# Patient Record
Sex: FEMALE | Race: BLACK OR AFRICAN AMERICAN | Employment: UNEMPLOYED | URBAN - METROPOLITAN AREA
[De-identification: names, ages, dates, MRNs, and addresses within clinical notes are randomized per-mention and may not be internally consistent; named-entity substitution may affect disease eponyms.]

---

## 2019-02-08 ENCOUNTER — HOSPITAL ENCOUNTER (INPATIENT)
Age: 49
LOS: 15 days | Discharge: NURSING, REHAB OR LONG-TERM CARE | DRG: 871 | End: 2019-02-23
Attending: EMERGENCY MEDICINE | Admitting: HOSPITALIST
Payer: MEDICARE

## 2019-02-08 ENCOUNTER — APPOINTMENT (OUTPATIENT)
Dept: GENERAL RADIOLOGY | Age: 49
DRG: 871 | End: 2019-02-08
Attending: EMERGENCY MEDICINE
Payer: MEDICARE

## 2019-02-08 ENCOUNTER — APPOINTMENT (OUTPATIENT)
Dept: CT IMAGING | Age: 49
DRG: 871 | End: 2019-02-08
Attending: EMERGENCY MEDICINE
Payer: MEDICARE

## 2019-02-08 DIAGNOSIS — N39.0 URINARY TRACT INFECTION WITHOUT HEMATURIA, SITE UNSPECIFIED: Primary | ICD-10-CM

## 2019-02-08 DIAGNOSIS — E80.6 HYPERBILIRUBINEMIA: ICD-10-CM

## 2019-02-08 DIAGNOSIS — R41.0 CONFUSION: ICD-10-CM

## 2019-02-08 DIAGNOSIS — Z99.2 HEMODIALYSIS PATIENT (HCC): ICD-10-CM

## 2019-02-08 DIAGNOSIS — M79.604 PAIN OF RIGHT LOWER EXTREMITY: ICD-10-CM

## 2019-02-08 PROBLEM — N18.6 ESRD (END STAGE RENAL DISEASE) ON DIALYSIS (HCC): Status: ACTIVE | Noted: 2019-02-08

## 2019-02-08 PROBLEM — G93.41 METABOLIC ENCEPHALOPATHY: Status: ACTIVE | Noted: 2019-02-08

## 2019-02-08 PROBLEM — K74.60 CIRRHOSIS (HCC): Status: ACTIVE | Noted: 2019-02-08

## 2019-02-08 PROBLEM — J44.9 COPD (CHRONIC OBSTRUCTIVE PULMONARY DISEASE) (HCC): Status: ACTIVE | Noted: 2019-02-08

## 2019-02-08 LAB
ALBUMIN SERPL-MCNC: 1.5 G/DL (ref 3.4–5)
ALBUMIN/GLOB SERPL: 0.3 {RATIO} (ref 0.8–1.7)
ALP SERPL-CCNC: 571 U/L (ref 45–117)
ALT SERPL-CCNC: 19 U/L (ref 13–56)
AMMONIA PLAS-SCNC: <10 UMOL/L (ref 11–32)
ANION GAP SERPL CALC-SCNC: 15 MMOL/L (ref 3–18)
APPEARANCE UR: ABNORMAL
APTT PPP: 62.2 SEC (ref 23–36.4)
AST SERPL-CCNC: 27 U/L (ref 15–37)
ATRIAL RATE: 99 BPM
BACTERIA URNS QL MICRO: ABNORMAL /HPF
BILIRUB DIRECT SERPL-MCNC: 18 MG/DL (ref 0–0.2)
BILIRUB SERPL-MCNC: 21.7 MG/DL (ref 0.2–1)
BILIRUB UR QL: ABNORMAL
BUN SERPL-MCNC: 31 MG/DL (ref 7–18)
BUN/CREAT SERPL: 5 (ref 12–20)
CALCIUM SERPL-MCNC: 10.3 MG/DL (ref 8.5–10.1)
CALCULATED P AXIS, ECG09: 81 DEGREES
CALCULATED P AXIS, ECG09: 87 DEGREES
CALCULATED P AXIS, ECG09: 88 DEGREES
CALCULATED R AXIS, ECG10: 26 DEGREES
CALCULATED R AXIS, ECG10: 29 DEGREES
CALCULATED R AXIS, ECG10: 46 DEGREES
CALCULATED T AXIS, ECG11: 121 DEGREES
CALCULATED T AXIS, ECG11: 84 DEGREES
CALCULATED T AXIS, ECG11: 85 DEGREES
CHLORIDE SERPL-SCNC: 88 MMOL/L (ref 100–108)
CK MB CFR SERPL CALC: ABNORMAL % (ref 0–4)
CK MB CFR SERPL CALC: ABNORMAL % (ref 0–4)
CK MB SERPL-MCNC: <1 NG/ML (ref 5–25)
CK MB SERPL-MCNC: <1 NG/ML (ref 5–25)
CK SERPL-CCNC: 34 U/L (ref 26–192)
CK SERPL-CCNC: 35 U/L (ref 26–192)
CO2 SERPL-SCNC: 28 MMOL/L (ref 21–32)
COLOR UR: ABNORMAL
CREAT SERPL-MCNC: 6.81 MG/DL (ref 0.6–1.3)
DIAGNOSIS, 93000: NORMAL
EPITH CASTS URNS QL MICRO: ABNORMAL /LPF (ref 0–5)
ERYTHROCYTE [DISTWIDTH] IN BLOOD BY AUTOMATED COUNT: 23.2 % (ref 11.6–14.5)
FLUAV AG NPH QL IA: NEGATIVE
FLUBV AG NOSE QL IA: NEGATIVE
GLOBULIN SER CALC-MCNC: 5.8 G/DL (ref 2–4)
GLUCOSE SERPL-MCNC: 70 MG/DL (ref 74–99)
GLUCOSE UR STRIP.AUTO-MCNC: NEGATIVE MG/DL
HCT VFR BLD AUTO: 30.7 % (ref 35–45)
HGB BLD-MCNC: 10.4 G/DL (ref 12–16)
HGB UR QL STRIP: ABNORMAL
INR PPP: 2.7 (ref 0.8–1.2)
KETONES UR QL STRIP.AUTO: NEGATIVE MG/DL
LEUKOCYTE ESTERASE UR QL STRIP.AUTO: ABNORMAL
LIPASE SERPL-CCNC: 40 U/L (ref 73–393)
MCH RBC QN AUTO: 26.1 PG (ref 24–34)
MCHC RBC AUTO-ENTMCNC: 33.9 G/DL (ref 31–37)
MCV RBC AUTO: 77.1 FL (ref 74–97)
NITRITE UR QL STRIP.AUTO: POSITIVE
P-R INTERVAL, ECG05: 150 MS
P-R INTERVAL, ECG05: 166 MS
P-R INTERVAL, ECG05: 166 MS
PH UR STRIP: 6.5 [PH] (ref 5–8)
PLATELET # BLD AUTO: 136 K/UL (ref 135–420)
POTASSIUM SERPL-SCNC: 4.9 MMOL/L (ref 3.5–5.5)
PROT SERPL-MCNC: 7.3 G/DL (ref 6.4–8.2)
PROT UR STRIP-MCNC: 30 MG/DL
PROTHROMBIN TIME: 29.2 SEC (ref 11.5–15.2)
Q-T INTERVAL, ECG07: 358 MS
Q-T INTERVAL, ECG07: 394 MS
Q-T INTERVAL, ECG07: 404 MS
QRS DURATION, ECG06: 102 MS
QRS DURATION, ECG06: 104 MS
QRS DURATION, ECG06: 118 MS
QTC CALCULATION (BEZET), ECG08: 459 MS
QTC CALCULATION (BEZET), ECG08: 505 MS
QTC CALCULATION (BEZET), ECG08: 518 MS
RBC # BLD AUTO: 3.98 M/UL (ref 4.2–5.3)
RBC #/AREA URNS HPF: ABNORMAL /HPF (ref 0–5)
SODIUM SERPL-SCNC: 131 MMOL/L (ref 136–145)
SP GR UR REFRACTOMETRY: 1.02 (ref 1–1.03)
TROPONIN I SERPL-MCNC: 0.17 NG/ML (ref 0–0.04)
TROPONIN I SERPL-MCNC: 0.18 NG/ML (ref 0–0.04)
UROBILINOGEN UR QL STRIP.AUTO: 0.2 EU/DL (ref 0.2–1)
VENTRICULAR RATE, ECG03: 99 BPM
WBC # BLD AUTO: 15.9 K/UL (ref 4.6–13.2)
WBC URNS QL MICRO: ABNORMAL /HPF (ref 0–4)

## 2019-02-08 PROCEDURE — 85027 COMPLETE CBC AUTOMATED: CPT

## 2019-02-08 PROCEDURE — 85610 PROTHROMBIN TIME: CPT

## 2019-02-08 PROCEDURE — 93005 ELECTROCARDIOGRAM TRACING: CPT

## 2019-02-08 PROCEDURE — 82140 ASSAY OF AMMONIA: CPT

## 2019-02-08 PROCEDURE — 77030011943

## 2019-02-08 PROCEDURE — 83690 ASSAY OF LIPASE: CPT

## 2019-02-08 PROCEDURE — 74011250637 HC RX REV CODE- 250/637: Performed by: HOSPITALIST

## 2019-02-08 PROCEDURE — 65270000029 HC RM PRIVATE

## 2019-02-08 PROCEDURE — 81001 URINALYSIS AUTO W/SCOPE: CPT

## 2019-02-08 PROCEDURE — 82550 ASSAY OF CK (CPK): CPT

## 2019-02-08 PROCEDURE — 71045 X-RAY EXAM CHEST 1 VIEW: CPT

## 2019-02-08 PROCEDURE — 74011250636 HC RX REV CODE- 250/636: Performed by: EMERGENCY MEDICINE

## 2019-02-08 PROCEDURE — 96365 THER/PROPH/DIAG IV INF INIT: CPT

## 2019-02-08 PROCEDURE — 85730 THROMBOPLASTIN TIME PARTIAL: CPT

## 2019-02-08 PROCEDURE — 80053 COMPREHEN METABOLIC PANEL: CPT

## 2019-02-08 PROCEDURE — 87086 URINE CULTURE/COLONY COUNT: CPT

## 2019-02-08 PROCEDURE — 87804 INFLUENZA ASSAY W/OPTIC: CPT

## 2019-02-08 PROCEDURE — 94664 DEMO&/EVAL PT USE INHALER: CPT

## 2019-02-08 PROCEDURE — 74011000258 HC RX REV CODE- 258: Performed by: EMERGENCY MEDICINE

## 2019-02-08 PROCEDURE — 87340 HEPATITIS B SURFACE AG IA: CPT

## 2019-02-08 PROCEDURE — 99285 EMERGENCY DEPT VISIT HI MDM: CPT

## 2019-02-08 PROCEDURE — 70450 CT HEAD/BRAIN W/O DYE: CPT

## 2019-02-08 PROCEDURE — 74011000250 HC RX REV CODE- 250: Performed by: HOSPITALIST

## 2019-02-08 PROCEDURE — 86706 HEP B SURFACE ANTIBODY: CPT

## 2019-02-08 PROCEDURE — 94640 AIRWAY INHALATION TREATMENT: CPT

## 2019-02-08 PROCEDURE — 82248 BILIRUBIN DIRECT: CPT

## 2019-02-08 RX ORDER — HYDRALAZINE HYDROCHLORIDE 25 MG/1
25 TABLET, FILM COATED ORAL 3 TIMES DAILY
Status: DISCONTINUED | OUTPATIENT
Start: 2019-02-08 | End: 2019-02-11

## 2019-02-08 RX ORDER — SEVELAMER CARBONATE 800 MG/1
800 TABLET, FILM COATED ORAL
Status: DISCONTINUED | OUTPATIENT
Start: 2019-02-08 | End: 2019-02-13

## 2019-02-08 RX ORDER — CARVEDILOL 25 MG/1
25 TABLET ORAL 2 TIMES DAILY WITH MEALS
Status: DISCONTINUED | OUTPATIENT
Start: 2019-02-08 | End: 2019-02-12

## 2019-02-08 RX ORDER — FLUTICASONE PROPIONATE AND SALMETEROL 250; 50 UG/1; UG/1
1 POWDER RESPIRATORY (INHALATION) EVERY 12 HOURS
Status: DISCONTINUED | OUTPATIENT
Start: 2019-02-08 | End: 2019-02-08

## 2019-02-08 RX ORDER — CINACALCET 30 MG/1
30 TABLET, FILM COATED ORAL DAILY
Status: DISCONTINUED | OUTPATIENT
Start: 2019-02-09 | End: 2019-02-11

## 2019-02-08 RX ORDER — CALCITRIOL 0.25 UG/1
0.5 CAPSULE ORAL DAILY
Status: DISCONTINUED | OUTPATIENT
Start: 2019-02-09 | End: 2019-02-11

## 2019-02-08 RX ORDER — BUDESONIDE 0.5 MG/2ML
500 INHALANT ORAL
Status: DISCONTINUED | OUTPATIENT
Start: 2019-02-08 | End: 2019-02-19

## 2019-02-08 RX ORDER — SEVELAMER HYDROCHLORIDE 400 MG/1
400 TABLET, FILM COATED ORAL
Status: DISCONTINUED | OUTPATIENT
Start: 2019-02-08 | End: 2019-02-08

## 2019-02-08 RX ORDER — ISOSORBIDE MONONITRATE 60 MG/1
60 TABLET, EXTENDED RELEASE ORAL
Status: DISCONTINUED | OUTPATIENT
Start: 2019-02-09 | End: 2019-02-11

## 2019-02-08 RX ORDER — ZOLPIDEM TARTRATE 5 MG/1
5 TABLET ORAL
Status: DISCONTINUED | OUTPATIENT
Start: 2019-02-08 | End: 2019-02-12

## 2019-02-08 RX ORDER — ARFORMOTEROL TARTRATE 15 UG/2ML
15 SOLUTION RESPIRATORY (INHALATION)
Status: DISCONTINUED | OUTPATIENT
Start: 2019-02-08 | End: 2019-02-19

## 2019-02-08 RX ORDER — CEFTRIAXONE 1 G/1
1 INJECTION, POWDER, FOR SOLUTION INTRAMUSCULAR; INTRAVENOUS
Status: DISCONTINUED | OUTPATIENT
Start: 2019-02-08 | End: 2019-02-08 | Stop reason: SDUPTHER

## 2019-02-08 RX ADMIN — BUDESONIDE 500 MCG: 0.5 INHALANT RESPIRATORY (INHALATION) at 21:44

## 2019-02-08 RX ADMIN — CEFTRIAXONE SODIUM 1 G: 1 INJECTION, POWDER, FOR SOLUTION INTRAMUSCULAR; INTRAVENOUS at 09:52

## 2019-02-08 RX ADMIN — ARFORMOTEROL TARTRATE 15 MCG: 15 SOLUTION RESPIRATORY (INHALATION) at 21:44

## 2019-02-08 RX ADMIN — HYDRALAZINE HYDROCHLORIDE 25 MG: 25 TABLET, FILM COATED ORAL at 22:57

## 2019-02-08 RX ADMIN — HYDRALAZINE HYDROCHLORIDE 25 MG: 25 TABLET, FILM COATED ORAL at 16:27

## 2019-02-08 RX ADMIN — SEVELAMER CARBONATE 800 MG: 800 TABLET, FILM COATED ORAL at 16:28

## 2019-02-08 RX ADMIN — CARVEDILOL 25 MG: 25 TABLET, FILM COATED ORAL at 16:27

## 2019-02-08 NOTE — ED PROVIDER NOTES
EMERGENCY DEPARTMENT HISTORY AND PHYSICAL EXAM 
 
6:42 AM 
 
 
Date: 2/8/2019 Patient Name: Jamie Underwood History of Presenting Illness Chief Complaint Patient presents with  Altered mental status History Provided By: Patient and EMS Additional History (Context): Jamie Underwood is a 46 y.o. female with a PMHx of renal failure and DM provided by her dialysis clinic who presents with acute AMS onset x \"this morning\". The pt was brought in via EMS from her dialysis clinic after \"not acting herself\". She was unable to complete her dialysis session. EMS reports her eyes are jaundice. Pt at bedside pt is not answering questions promptly. No further concerns or complaints at this time. Limited HPI due to AMS. PCP: None Chief Complaint: AMS Duration: \"this morning\" Hours Timing:  Acute Location: Generalized Quality: N/A Severity: Moderate Modifying Factors: N/A Associated Symptoms: No associated Sx Current Outpatient Medications Medication Sig Dispense Refill  fluticasone-salmeterol (ADVAIR DISKUS) 250-50 mcg/dose diskus inhaler Take 1 Puff by inhalation every twelve (12) hours.  ferric citrate (AURYXIA) 210 mg iron tablet Take  by mouth three (3) times daily (with meals).  calcitRIOL (ROCALTROL) 0.5 mcg capsule Take 0.5 mcg by mouth daily.  carvedilol (COREG) 25 mg tablet Take 25 mg by mouth two (2) times daily (with meals).  hydrALAZINE (APRESOLINE) 25 mg tablet Take 25 mg by mouth three (3) times daily.  isosorbide mononitrate ER (IMDUR) 60 mg CR tablet Take 60 mg by mouth every morning.  sevelamer (RENAGEL) 400 mg tablet Take 400 mg by mouth three (3) times daily (with meals).  multivitamin with minerals (RENAL PO) Take  by mouth.  sevelamer carbonate (RENVELA) 800 mg tab tab Take  by mouth three (3) times daily.  cinacalcet (SENSIPAR) 30 mg tablet Take 30 mg by mouth daily.  traZODone (DESYREL) 100 mg tablet Take 100 mg by mouth nightly. Past History Past Medical History: 
History reviewed. No pertinent past medical history. Past Surgical History: 
History reviewed. No pertinent surgical history. Family History: 
History reviewed. No pertinent family history. Social History: 
Social History Tobacco Use  Smoking status: Unknown If Ever Smoked Substance Use Topics  Alcohol use: No  
  Frequency: Never  Drug use: No  
 
 
Allergies: 
No Known Allergies Review of Systems Review of Systems Unable to perform ROS: Mental status change Physical Exam  
 
Visit Vitals /66 Pulse 98 Temp 97.5 °F (36.4 °C) Resp 17 Wt 61.2 kg (135 lb) SpO2 97% BMI 22.47 kg/m² Physical Exam  
Constitutional: She appears well-developed and well-nourished. No distress. HENT:  
Head: Normocephalic and atraumatic. Right Ear: External ear normal.  
Left Ear: External ear normal.  
Nose: Nose normal.  
Mouth/Throat: Oropharynx is clear and moist.  
Eyes: Conjunctivae and EOM are normal. Pupils are equal, round, and reactive to light. Scleral icterus is present. Neck: Normal range of motion. Neck supple. No JVD present. No tracheal deviation present. No thyromegaly present. Cardiovascular: Normal rate and regular rhythm. Exam reveals no friction rub. No murmur heard. Pulmonary/Chest: Effort normal and breath sounds normal. No stridor. She exhibits no tenderness. Lungs are clear. Abdominal: Soft. Bowel sounds are normal. She exhibits no distension. There is no tenderness. There is no rebound and no guarding. Musculoskeletal: Normal range of motion. She exhibits no tenderness. Left arm has fistula with good thrill. LLE is edematous. Lymphadenopathy:  
  She has no cervical adenopathy. Neurological: No cranial nerve deficit. Coordination normal.  
No response to verbal stimuli. Responsive to tactile stimuli. Skin: Skin is warm and dry. Psychiatric: She has a normal mood and affect. Her behavior is normal. Judgment and thought content normal.  
Nursing note and vitals reviewed. Diagnostic Study Results Labs - Recent Results (from the past 12 hour(s)) CBC W/O DIFF Collection Time: 02/08/19  6:05 AM  
Result Value Ref Range WBC 15.9 (H) 4.6 - 13.2 K/uL  
 RBC 3.98 (L) 4.20 - 5.30 M/uL  
 HGB 10.4 (L) 12.0 - 16.0 g/dL HCT 30.7 (L) 35.0 - 45.0 % MCV 77.1 74.0 - 97.0 FL  
 MCH 26.1 24.0 - 34.0 PG  
 MCHC 33.9 31.0 - 37.0 g/dL RDW 23.2 (H) 11.6 - 14.5 % PLATELET 147 538 - 251 K/uL METABOLIC PANEL, COMPREHENSIVE Collection Time: 02/08/19  6:05 AM  
Result Value Ref Range Sodium 131 (L) 136 - 145 mmol/L Potassium 4.9 3.5 - 5.5 mmol/L Chloride 88 (L) 100 - 108 mmol/L  
 CO2 28 21 - 32 mmol/L Anion gap 15 3.0 - 18 mmol/L Glucose 70 (L) 74 - 99 mg/dL BUN 31 (H) 7.0 - 18 MG/DL Creatinine 6.81 (H) 0.6 - 1.3 MG/DL  
 BUN/Creatinine ratio 5 (L) 12 - 20 GFR est AA 8 (L) >60 ml/min/1.73m2 GFR est non-AA 6 (L) >60 ml/min/1.73m2 Calcium 10.3 (H) 8.5 - 10.1 MG/DL Bilirubin, total 21.7 (H) 0.2 - 1.0 MG/DL  
 ALT (SGPT) 19 13 - 56 U/L  
 AST (SGOT) 27 15 - 37 U/L Alk. phosphatase 571 (H) 45 - 117 U/L Protein, total 7.3 6.4 - 8.2 g/dL Albumin 1.5 (L) 3.4 - 5.0 g/dL Globulin 5.8 (H) 2.0 - 4.0 g/dL A-G Ratio 0.3 (L) 0.8 - 1.7 CARDIAC PANEL,(CK, CKMB & TROPONIN) Collection Time: 02/08/19  6:05 AM  
Result Value Ref Range CK 35 26 - 192 U/L  
 CK - MB <1.0 <3.6 ng/ml CK-MB Index  0.0 - 4.0 % CALCULATION NOT PERFORMED WHEN RESULT IS BELOW LINEAR LIMIT Troponin-I, QT 0.18 (H) 0.0 - 0.045 NG/ML  
LIPASE Collection Time: 02/08/19  6:05 AM  
Result Value Ref Range Lipase 40 (L) 73 - 393 U/L  
BILIRUBIN, DIRECT Collection Time: 02/08/19  6:05 AM  
Result Value Ref Range Bilirubin, direct 18.0 (H) 0.0 - 0.2 MG/DL  
PTT Collection Time: 02/08/19  6:05 AM  
Result Value Ref Range aPTT 62.2 (H) 23.0 - 36.4 SEC PROTHROMBIN TIME + INR Collection Time: 02/08/19  6:05 AM  
Result Value Ref Range Prothrombin time 29.2 (H) 11.5 - 15.2 sec INR 2.7 (H) 0.8 - 1.2 EKG, 12 LEAD, INITIAL Collection Time: 02/08/19  7:33 AM  
Result Value Ref Range Ventricular Rate 99 BPM  
 Atrial Rate 99 BPM  
 P-R Interval 150 ms QRS Duration 118 ms Q-T Interval 404 ms QTC Calculation (Bezet) 518 ms Calculated P Axis 88 degrees Calculated R Axis 46 degrees Calculated T Axis 121 degrees Diagnosis Poor data quality, interpretation may be adversely affected Electrode noise Repeat EKG Normal sinus rhythm Poor R Wave Progression Non-specific ST/T wave changes Prolonged QT Abnormal ECG No previous ECGs available Confirmed by Ricky Srinivasan ([id number]) on 2/8/2019 10:54:45 AM 
  
EKG, 12 LEAD, SUBSEQUENT Collection Time: 02/08/19  7:38 AM  
Result Value Ref Range Ventricular Rate 99 BPM  
 Atrial Rate 99 BPM  
 P-R Interval 166 ms  
 QRS Duration 102 ms Q-T Interval 358 ms QTC Calculation (Bezet) 459 ms Calculated P Axis 81 degrees Calculated R Axis 26 degrees Calculated T Axis 84 degrees Diagnosis Normal sinus rhythm Possible Left atrial enlargement Poor R Wave Progression Nonspecific ST abnormality Abnormal ECG When compared with ECG of 08-FEB-2019 07:33, 
QT has shortened Confirmed by Ricky Srinivasan ([id number]) on 2/8/2019 10:55:05 AM 
  
INFLUENZA A & B AG (RAPID TEST) Collection Time: 02/08/19  7:44 AM  
Result Value Ref Range Influenza A Antigen NEGATIVE  NEG Influenza B Antigen NEGATIVE  NEG    
AMMONIA Collection Time: 02/08/19  7:45 AM  
Result Value Ref Range Ammonia <10 (L) 11 - 32 UMOL/L  
URINALYSIS W/ RFLX MICROSCOPIC Collection Time: 02/08/19  7:52 AM  
Result Value Ref Range Color DARK YELLOW Appearance CLOUDY Specific gravity 1.018 1.005 - 1.030    
 pH (UA) 6.5 5.0 - 8.0 Protein 30 (A) NEG mg/dL Glucose NEGATIVE  NEG mg/dL Ketone NEGATIVE  NEG mg/dL Bilirubin MODERATE (A) NEG Blood TRACE (A) NEG Urobilinogen 0.2 0.2 - 1.0 EU/dL Nitrites POSITIVE (A) NEG Leukocyte Esterase LARGE (A) NEG URINE MICROSCOPIC ONLY Collection Time: 02/08/19  7:52 AM  
Result Value Ref Range WBC TOO NUMEROUS TO COUNT 0 - 4 /hpf  
 RBC 4 to 10 0 - 5 /hpf Epithelial cells 3+ 0 - 5 /lpf Bacteria 4+ (A) NEG /hpf CARDIAC PANEL,(CK, CKMB & TROPONIN) Collection Time: 02/08/19  9:35 AM  
Result Value Ref Range CK 34 26 - 192 U/L  
 CK - MB <1.0 <3.6 ng/ml CK-MB Index  0.0 - 4.0 % CALCULATION NOT PERFORMED WHEN RESULT IS BELOW LINEAR LIMIT Troponin-I, QT 0.17 (H) 0.0 - 0.045 NG/ML  
EKG, 12 LEAD, SUBSEQUENT Collection Time: 02/08/19  9:52 AM  
Result Value Ref Range Ventricular Rate 99 BPM  
 Atrial Rate 99 BPM  
 P-R Interval 166 ms  
 QRS Duration 104 ms Q-T Interval 394 ms QTC Calculation (Bezet) 505 ms Calculated P Axis 87 degrees Calculated R Axis 29 degrees Calculated T Axis 85 degrees Diagnosis Normal sinus rhythm Possible Left atrial enlargement Poor data quality, interpretation may be adversely affected Prolonged QT Abnormal ECG When compared with ECG of 08-FEB-2019 07:38, No significant change was found Confirmed by [facility] ([id number]) on 2/8/2019 1:12:59 PM 
  
 
 
Radiologic Studies -  
XR CHEST PORT Final Result Impression: 1. Mildly underexpanded lungs and cardiac enlargement without superimposed acute  
radiographic cardiopulmonary abnormality. 2. Prominence of the main pulmonary arterial shadow may reflect sequela from  
pulmonary arterial hypertension. CT HEAD WO CONT Final Result IMPRESSION:  
  
  
1. No acute intracranial abnormality.   
2. Background mild periventricular white matter low-attenuation compatible with  
chronic ischemic microvascular change, with more focal areas of encephalomalacia  
as described, likely in keeping with prior strokes. Medical Decision Making It should be noted that Nikolai Crowell MD will be the provider of record for this patient. I reviewed the vital signs, available nursing notes, past medical history, past surgical history, family history and social history. Vital Signs-Reviewed the patient's vital signs. Pulse Oximetry Analysis -  100% on room air, normal.  
 
EKG: Interpreted by the EP. Time Interpreted: 07:33 Rate: 99 bpm 
 Rhythm: NSR Interpretation: QTC is 518 and Q-wave in lead III. The EKG states there is an acute MI. I don't see an acute STEMI on the actual EKG. Will repeat. EKG: Interpreted by the EP. Time Interpreted: 07:38 Rate: 99 bpm 
 Rhythm: NSR Interpretation: Q-wave in lead III. T-wave in the lateral wall. No obvious STEMI appreciated. EKG: Interpreted by the EP. Time Interpreted: 09:52 Rate: 99 bpm 
 Rhythm: NSR Interpretation: Prolonged QTC of 505. Unchanged morphology from prior. Records Reviewed: Nursing Notes and Old Medical Records (Time of Review: 6:42 AM) 
 
ED Course: Progress Notes, Reevaluation, and Consults: 
9:23 AM 
Pt's white count is 15.9. INR is 2.7. Creatinine is 6.81. Troponin is 0.18. Influenza is negative. CXR has no acute findings. Compared to 2/1/2019 INR is elevated today. Bilirubin is consistently high and same as prior visit. Will order repeat EKG and troponin. 12:15 PM, 2/8/2019 Consult:  Discussed care with Dr. Kody Luz, Specialty: Hospitalist. Standard discussion; including history of patients chief complaint, available diagnostic results, and treatment course. Accepts pt for admission. Consult:  Discussed care with Dr. Sheridan Kim, Specialty: GI. Standard discussion; including history of patients chief complaint, available diagnostic results, and treatment course. 12:42 PM, 2/8/2019  
 
1:04 PM 
Transfer center states the pt was seen by ELIE Cesar. They will get him to call back. They also state cannot be transferred due to [facility] being at capacity. Consult:  Discussed care with Brandi Caal PA-C who works for Dr. Sherwin Das, Specialty: GI  Standard discussion; including history of patients chief complaint, available diagnostic results, and treatment course. They think the pt is not in liver failure. They have worked her up, this is secondary to congestion. If there is confusion it is not related to the liver. 1:12 PM, 2/8/2019  
 
1:20 PM 
Pt's eileen is at bedside now. He states she has been confused intermittently for the last 5 days, and today was worse. 
 
2:02 PM, 2/8/2019 Consult:  Discussed care with Dr. Von Zapata, Specialty: Hospitalist. Standard discussion; including history of patients chief complaint, available diagnostic results, and treatment course. Accepts pt for admission. Provider Notes (Medical Decision Making):  
Patient presents to emergency department from dialysis. Patient is awake and alert and answers questions when given intact L simulation patient is sleepy to verbal stimulation patient does have jaundice. Patient vitals documented with-in normal limits. Triage note is reviewed and status patient is not acting herself. During my evaluated the patient and patient is sluggish to response although is awake and alert and knows she lives in [place] and knows her name and age. Patient states she cannot move her left leg well and also has swelling in the leg otherwise has no other complaints 
 patient has a fistula in the left arm that seems to have a good thrill with no acute findings on exam physical exam otherwise benign other than the jaundice. I will check patient for encephalopathy urinary tract infection if I can get a urinalysis I will check also EKG troponin for any acute coronary syndrome pneumonia and other causes for infection. I am reviewing patient's prior medical record: Which shows lower extremity cellulitis end-stage renal disease coronary artery disease bronchitis heart failure diabetes and hyperbilirubinemia as prior history patient had PVL of left lower extremity on February 1, 2019 there is no acute DVT that was diagnosed on the February 1 exam.   
On that day patient also had a white count of 15.8 and hemoglobin 11.3 patient on bilirubin of 22 and direct was 19.6 and alk phos was 608 Critical Care Time: The services I provided to this patient were to treat and/or prevent clinically significant deterioration that could result in the failure of one or more body systems and/or organ systems due to AMS. Services included the following: 
-reviewing nursing notes and old charts 
-vital sign assessments 
-direct patient care 
-medication orders and management 
-interpreting and reviewing diagnostic studies/labs 
-re-evaluations 
-documentation time Aggregate critical care time was 30 minutes, which includes only time during which I was engaged in work directly related to the patient's care as described above, whether I was at bedside or elsewhere in the Emergency Department. It did not include time spent performing other reported procedures or the services of residents, students, nurses, or advance practice providers. Umu Talbot MD 
 
7:40 AM 
 
For Hospitalized Patients: 
 
1. Hospitalization Decision Time: The decision to hospitalize the patient was made by Dr. Bonnie Hidalgo at 12:11 PM on 2/8/2019 2. Aspirin: Aspirin was not given because the patient did not present with a stroke at the time of their Emergency Department evaluation Diagnosis Clinical Impression: 1. Urinary tract infection without hematuria, site unspecified 2. Hyperbilirubinemia 3. Confusion 4. [facility] Disposition: Admission Follow-up Information None Medication List  
  
ASK your doctor about these medications ADVAIR DISKUS 250-50 mcg/dose diskus inhaler Generic drug:  fluticasone-salmeterol AURYXIA 210 mg iron tablet Generic drug:  ferric citrate 
  
calcitRIOL 0.5 mcg capsule Commonly known as:  ROCALTROL 
  
carvedilol 25 mg tablet Commonly known as:  COREG 
  
hydrALAZINE 25 mg tablet Commonly known as:  APRESOLINE 
  
isosorbide mononitrate ER 60 mg CR tablet Commonly known as:  IMDUR 
  
RENAGEL 400 mg tablet Generic drug:  sevelamer RENAL PO 
  
SENSIPAR 30 mg tablet Generic drug:  cinacalcet 
  
sevelamer carbonate 800 mg Tab tab Commonly known as:  RENVELA 
  
traZODone 100 mg tablet Commonly known as:  Antoni Hinojosa 
  
  
 
_______________________________ Scribe Attestation Ilene Cool acting as a scribe for and in the presence of Umu Talbot MD     
February 08, 2019 at [facility] 
    
Provider Attestation:     
I personally performed the services described in the documentation, reviewed the documentation, as recorded by the scribe in my presence, and it accurately and completely records my words and actions. February 08, 2019 at [address] [facility] Umu Talbot MD   
 
 
_______________________________

## 2019-02-08 NOTE — ED NOTES
Presents via medic awake, alert and in NAD. Medics report pt was at dialysis this am and staff there called 911 d/t pt \"not acting herself\"  Pt eyes are jaundice and she is not answering questions promptly.

## 2019-02-08 NOTE — ED NOTES
[facility]  24/7 and when pt admitted with room number. Ravi Ordoñez (daughter) [phone]

## 2019-02-08 NOTE — PROGRESS NOTES
attempted to complete the initial Spiritual Assessment of the patient in bed 5 of the emergency room, and offer Pastoral Care support. Patient however was not responsive at this time. No family seen . Patient does not have any Advent/cultural needs that will affect patients preferences in health care. Chaplains will continue to follow and will provide pastoral care on an as needed/requested basis. Blank 3 Board Certified [employer] Spiritual Care Department [phone]

## 2019-02-09 ENCOUNTER — APPOINTMENT (OUTPATIENT)
Dept: CT IMAGING | Age: 49
DRG: 871 | End: 2019-02-09
Attending: PHYSICIAN ASSISTANT
Payer: MEDICARE

## 2019-02-09 LAB
ANION GAP SERPL CALC-SCNC: 17 MMOL/L (ref 3–18)
BASOPHILS # BLD: 0.1 K/UL (ref 0–0.1)
BASOPHILS NFR BLD: 1 % (ref 0–2)
BUN SERPL-MCNC: 43 MG/DL (ref 7–18)
BUN/CREAT SERPL: 6 (ref 12–20)
CALCIUM SERPL-MCNC: 9.2 MG/DL (ref 8.5–10.1)
CALCIUM SERPL-MCNC: 9.4 MG/DL (ref 8.5–10.1)
CHLORIDE SERPL-SCNC: 91 MMOL/L (ref 100–108)
CO2 SERPL-SCNC: 27 MMOL/L (ref 21–32)
CREAT SERPL-MCNC: 7.68 MG/DL (ref 0.6–1.3)
DIFFERENTIAL METHOD BLD: ABNORMAL
EOSINOPHIL # BLD: 0.1 K/UL (ref 0–0.4)
EOSINOPHIL NFR BLD: 1 % (ref 0–5)
ERYTHROCYTE [DISTWIDTH] IN BLOOD BY AUTOMATED COUNT: 23.4 % (ref 11.6–14.5)
GLUCOSE SERPL-MCNC: 61 MG/DL (ref 74–99)
HBV SURFACE AG SER QL: <0.1 INDEX
HBV SURFACE AG SER QL: NEGATIVE
HCT VFR BLD AUTO: 28.5 % (ref 35–45)
HGB BLD-MCNC: 9.6 G/DL (ref 12–16)
IRON SATN MFR SERPL: 47 %
IRON SERPL-MCNC: 67 UG/DL (ref 50–175)
LYMPHOCYTES # BLD: 1.4 K/UL (ref 0.9–3.6)
LYMPHOCYTES NFR BLD: 11 % (ref 21–52)
MCH RBC QN AUTO: 25.7 PG (ref 24–34)
MCHC RBC AUTO-ENTMCNC: 33.7 G/DL (ref 31–37)
MCV RBC AUTO: 76.2 FL (ref 74–97)
MONOCYTES # BLD: 0.6 K/UL (ref 0.05–1.2)
MONOCYTES NFR BLD: 5 % (ref 3–10)
NEUTS SEG # BLD: 10.5 K/UL (ref 1.8–8)
NEUTS SEG NFR BLD: 82 % (ref 40–73)
PHOSPHATE SERPL-MCNC: 7.3 MG/DL (ref 2.5–4.9)
PLATELET # BLD AUTO: 113 K/UL (ref 135–420)
POTASSIUM SERPL-SCNC: 5.2 MMOL/L (ref 3.5–5.5)
PTH-INTACT SERPL-MCNC: 70.3 PG/ML (ref 18.4–88)
RBC # BLD AUTO: 3.74 M/UL (ref 4.2–5.3)
SODIUM SERPL-SCNC: 135 MMOL/L (ref 136–145)
TIBC SERPL-MCNC: 143 UG/DL (ref 250–450)
WBC # BLD AUTO: 12.6 K/UL (ref 4.6–13.2)

## 2019-02-09 PROCEDURE — 74011000258 HC RX REV CODE- 258: Performed by: HOSPITALIST

## 2019-02-09 PROCEDURE — 74011250636 HC RX REV CODE- 250/636: Performed by: PHYSICIAN ASSISTANT

## 2019-02-09 PROCEDURE — 83540 ASSAY OF IRON: CPT

## 2019-02-09 PROCEDURE — 65270000029 HC RM PRIVATE

## 2019-02-09 PROCEDURE — 80048 BASIC METABOLIC PNL TOTAL CA: CPT

## 2019-02-09 PROCEDURE — 74011000250 HC RX REV CODE- 250: Performed by: HOSPITALIST

## 2019-02-09 PROCEDURE — 84100 ASSAY OF PHOSPHORUS: CPT

## 2019-02-09 PROCEDURE — 90935 HEMODIALYSIS ONE EVALUATION: CPT

## 2019-02-09 PROCEDURE — 74011250636 HC RX REV CODE- 250/636: Performed by: HOSPITALIST

## 2019-02-09 PROCEDURE — 36415 COLL VENOUS BLD VENIPUNCTURE: CPT

## 2019-02-09 PROCEDURE — 74176 CT ABD & PELVIS W/O CONTRAST: CPT

## 2019-02-09 PROCEDURE — 74011250637 HC RX REV CODE- 250/637: Performed by: HOSPITALIST

## 2019-02-09 PROCEDURE — 94640 AIRWAY INHALATION TREATMENT: CPT

## 2019-02-09 PROCEDURE — 83970 ASSAY OF PARATHORMONE: CPT

## 2019-02-09 PROCEDURE — 85025 COMPLETE CBC W/AUTO DIFF WBC: CPT

## 2019-02-09 PROCEDURE — 5A1D70Z PERFORMANCE OF URINARY FILTRATION, INTERMITTENT, LESS THAN 6 HOURS PER DAY: ICD-10-PCS | Performed by: INTERNAL MEDICINE

## 2019-02-09 RX ORDER — LORAZEPAM 2 MG/ML
0.5 INJECTION INTRAMUSCULAR ONCE
Status: COMPLETED | OUTPATIENT
Start: 2019-02-09 | End: 2019-02-09

## 2019-02-09 RX ORDER — SODIUM CHLORIDE 9 MG/ML
50 INJECTION, SOLUTION INTRAVENOUS
Status: DISCONTINUED | OUTPATIENT
Start: 2019-02-09 | End: 2019-02-12

## 2019-02-09 RX ORDER — LORAZEPAM 2 MG/ML
0.5 INJECTION INTRAMUSCULAR
Status: DISCONTINUED | OUTPATIENT
Start: 2019-02-09 | End: 2019-02-12

## 2019-02-09 RX ORDER — LORAZEPAM 2 MG/ML
0.5 INJECTION INTRAMUSCULAR
Status: DISCONTINUED | OUTPATIENT
Start: 2019-02-09 | End: 2019-02-09

## 2019-02-09 RX ADMIN — HYDRALAZINE HYDROCHLORIDE 25 MG: 25 TABLET, FILM COATED ORAL at 22:37

## 2019-02-09 RX ADMIN — BUDESONIDE 500 MCG: 0.5 INHALANT RESPIRATORY (INHALATION) at 20:49

## 2019-02-09 RX ADMIN — LORAZEPAM 0.5 MG: 2 INJECTION, SOLUTION INTRAMUSCULAR; INTRAVENOUS at 16:24

## 2019-02-09 RX ADMIN — ARFORMOTEROL TARTRATE 15 MCG: 15 SOLUTION RESPIRATORY (INHALATION) at 20:49

## 2019-02-09 RX ADMIN — CEFTRIAXONE 1 G: 1 INJECTION, POWDER, FOR SOLUTION INTRAMUSCULAR; INTRAVENOUS at 17:24

## 2019-02-09 RX ADMIN — LORAZEPAM 0.5 MG: 2 INJECTION, SOLUTION INTRAMUSCULAR; INTRAVENOUS at 11:31

## 2019-02-09 NOTE — PROGRESS NOTES
Reason for Admission: uti RRAT Score:    19 Do you (patient/family) have any concerns for transition/discharge? Has been declining Plan for utilizing home health:     yes Likelihood of readmission?   mod Transition of Care Plan:  Spoke with pt, not able to participate fully in interview. Wants me to call apolinar willingham on board in room. Called maulik easton, he is her Victoriano Gustine" she lives with him. He states at baseline pt is A&O x4. She has been declining for a couple of weeks, getting \"delerious\". She amb with a walker. Independent with adls. but  He has had to help her lately. She goes to dialysis at on [address] he does not know the name. She goes [facility] 6a-10a by handiride. [facility] pcp is at [facility], he does not know name. Pt has a dtr mitchell pollack, her # is [phone]. Plan home with hh, they recently dc'd her, may need snf cm to follow. Patient has designated ___friend_____________________ to participate in his/her discharge plan and to receive any needed information. Name: Missy lambert Address: 
Phone number:[phone]

## 2019-02-09 NOTE — PROGRESS NOTES
Problem: Discharge Planning Goal: *Discharge to safe environment Outcome: Progressing Towards Goal 
hh vs snf

## 2019-02-09 NOTE — H&P
Internal Medicine History and Physical 
   
 
 
Subjective HPI: Dhruv Isaac is a 46 y.o. female with a PMHx ESRD on dialysis, DM, CAD, COPD, syst/diastolic heart failure, HTN, chronic liver disease who presented to the ED from dialysis due to AMS. History limited 2/2 AMS. ED eval revealed scleral icterus, confusion, leukocytosis, UTI. Hyperbilirubinemia and elevated alk phos are similar to recent ED visit at [facility] on 2/1. Review of prior records revealed she was supposed to have a percutaneous gallbladder drain placed on 2/4/19. Head CT neg. Pt will be admitted for further evaluation. PMHx: 
Past Medical History:  
Diagnosis Date  CAD (coronary artery disease)  Chronic obstructive pulmonary disease ([facility]  Combined systolic and diastolic heart failure ([facility]  Diabetes ([facility] [facility]  ESRD on dialysis ([facility]  HLD (hyperlipidemia)  Hypertension  Liver disease  S/P heart valve repair PSurgHx: 
History reviewed. No pertinent surgical history. SocialHx: 
Social History Socioeconomic History  Marital status: SINGLE Spouse name: Not on file  Number of children: Not on file  Years of education: Not on file  Highest education level: Not on file Social Needs  Financial resource strain: Not on file  Food insecurity - worry: Not on file  Food insecurity - inability: Not on file  Transportation needs - medical: Not on file  Transportation needs - non-medical: Not on file Occupational History  Not on file Tobacco Use  Smoking status: Unknown If Ever Smoked Substance and Sexual Activity  Alcohol use: No  
  Frequency: Never  Drug use: No  
 Sexual activity: Not on file Other Topics Concern  Not on file Social History Narrative  Not on file FamilyHx: 
History reviewed. No pertinent family history. Prior to Admission Medications Prescriptions Last Dose Informant Patient Reported? Taking? calcitRIOL (ROCALTROL) 0.5 mcg capsule   Yes No  
Sig: Take 0.5 mcg by mouth daily. carvedilol (COREG) 25 mg tablet   Yes No  
Sig: Take 25 mg by mouth two (2) times daily (with meals). cinacalcet (SENSIPAR) 30 mg tablet   Yes No  
Sig: Take 30 mg by mouth daily. ferric citrate (AURYXIA) 210 mg iron tablet   Yes No  
Sig: Take  by mouth three (3) times daily (with meals). fluticasone-salmeterol (ADVAIR DISKUS) 250-50 mcg/dose diskus inhaler   Yes No  
Sig: Take 1 Puff by inhalation every twelve (12) hours. hydrALAZINE (APRESOLINE) 25 mg tablet   Yes No  
Sig: Take 25 mg by mouth three (3) times daily. isosorbide mononitrate ER (IMDUR) 60 mg CR tablet   Yes No  
Sig: Take 60 mg by mouth every morning.  
multivitamin with minerals (RENAL PO)   Yes No  
Sig: Take  by mouth.  
sevelamer (RENAGEL) 400 mg tablet   Yes No  
Sig: Take 400 mg by mouth three (3) times daily (with meals). sevelamer carbonate (RENVELA) 800 mg tab tab   Yes No  
Sig: Take  by mouth three (3) times daily. traZODone (DESYREL) 100 mg tablet   Yes No  
Sig: Take 100 mg by mouth nightly. Facility-Administered Medications: None Review of Systems: 
Unable to obtain 2/2 AMS Objective Visit Vitals /65 (BP 1 Location: Right arm, BP Patient Position: Supine) Pulse 97 Temp 98.2 °F (36.8 °C) Resp 15 Wt 61.2 kg (135 lb) SpO2 95% BMI 22.47 kg/m² Physical Exam: 
General Appearance: somnolent, confused HEENT: normocephalic/atraumatic, scleral icterus Lungs: CTA with normal respiratory effort Cardiovascular: RRR, no m/r/g, capillary refill < 2 sec, B/L DP/PT pulses +3/4 Abdomen: soft, non-tender, normal bowel sounds Extremities: no cyanosis, no peripheral edema Neuro: moves all extremities, confused Laboratory Studies: 
BMP:  
Lab Results Component Value Date/Time   (L) 02/08/2019 06:05 AM  
 K 4.9 02/08/2019 06:05 AM  
 CL 88 (L) 02/08/2019 06:05 AM  
 CO2 28 02/08/2019 06:05 AM  
 AGAP 15 02/08/2019 06:05 AM  
 GLU 70 (L) 02/08/2019 06:05 AM  
 BUN 31 (H) 02/08/2019 06:05 AM  
 CREA 6.81 (H) 02/08/2019 06:05 AM  
 GFRAA 8 (L) 02/08/2019 06:05 AM  
 GFRNA 6 (L) 02/08/2019 06:05 AM  
 
CBC:  
Lab Results Component Value Date/Time WBC 15.9 (H) 02/08/2019 06:05 AM  
 HGB 10.4 (L) 02/08/2019 06:05 AM  
 HCT 30.7 (L) 02/08/2019 06:05 AM  
  02/08/2019 06:05 AM  
 
 
Imaging Reviewed: 
Ct Head Wo Cont Result Date: 2/8/2019 EXAM: CT head INDICATION: Altered mental status COMPARISON: None. TECHNIQUE: Axial CT imaging of the head was performed without intravenous contrast. One or more dose reduction techniques were used on this CT: automated exposure control, adjustment of the mAs and/or kVp according to patient size, and iterative reconstruction techniques. The specific techniques used on this CT exam have been documented in the patient's electronic medical record. Digital Imaging and Communications in Medicine (DICOM) format image data are available to nonaffiliated external healthcare facilities or entities on a secure, media free, reciprocally searchable basis with patient authorization for at least a 12-month period after this study. _______________ FINDINGS: BRAIN AND POSTERIOR FOSSA: Cortical and cerebellar volume loss is demonstrated. Ventricular size and configuration is within normal limits. Mild periventricular white matter low-attenuation focal areas of encephalomalacia in the left centrum semiovale bowel paramedian right parietal lobe. There is no intracranial hemorrhage, mass effect, or midline shift. The gray-white matter differentiation appears within normal limits. EXTRA-AXIAL SPACES AND MENINGES: There are no abnormal extra-axial fluid collections. CALVARIUM: Intact. SINUSES: Clear. OTHER: None. _______________ IMPRESSION: 1. No acute intracranial abnormality.  2. Background mild periventricular white matter low-attenuation compatible with chronic ischemic microvascular change, with more focal areas of encephalomalacia as described, likely in keeping with prior strokes. Xr Chest [facility] Result Date: 2/8/2019 Chest, single view Indication: Altered mental status, confusion, jaundice Comparison: None Findings:  Portable upright AP view of the chest was obtained. No focal pneumonic opacity, pneumothorax, or pleural effusion. Cardiac size is enlarged, with prominence of the main pulmonary arterial shadow. No acute osseous abnormality. Impression: 1. Mildly underexpanded lungs and cardiac enlargement without superimposed acute radiographic cardiopulmonary abnormality. 2. Prominence of the main pulmonary arterial shadow may reflect sequela from pulmonary arterial hypertension. EKG:  
normal sinus rhythm, prolonged QT interval 505. Assessment/Plan Principal Problem: 
  UTI (urinary tract infection) (2/8/2019) Active Problems: Metabolic encephalopathy (7/4/1716) Cirrhosis ([facility] [address] (2/8/2019) ESRD (end stage renal disease) on dialysis ([facility] [address] (2/8/2019) COPD (chronic obstructive pulmonary disease) ([facility] [address] (2/8/2019) Hyperbilirubinemia (2/8/2019) UTI 
- iv abx 
- follow cx Metabolic encephalopathy 
- Head CT - no acute abnormality 
- monitor neuro status ESRD on dialysis 
- nephro consult in AM 
 
COPD 
- brovana, pulmicort Hyperbilirubinemia/ elevated alk phos 
- CT abd in AM 
- GI consult - appreciate assistance 
 
 
- Cont acceptable home medications for chronic conditions  
- DVT protocol I have personally reviewed all pertinent labs, films and EKGs that have officially resulted. I reviewed available electronic documentation outlining the initial presentation as well as the emergency room physician's encounter. Clayton Hopper PA-C [facility] Office:  [phone] Pager: [phone]

## 2019-02-09 NOTE — PROGRESS NOTES
Thank you for notifying us of patient's admission for altered ms. I called op dialysis unit- [facility] on [address]  Mental status is normal at baseline, presented there yesterday with confusion. No dialysis was done. Will order dialysis for this am while altered ms is being worked up. Thanks.

## 2019-02-09 NOTE — PROGRESS NOTES
Consult note Admit Date: 2/8/2019 Consult Date: 2/9/19 Requesting MD: Zaida Larson Reason for Consult ESRD Patient Active Problem List  
 Diagnosis Date Noted  Metabolic encephalopathy 38/19/5635  UTI (urinary tract infection) 02/08/2019  Cirrhosis ([facility] [address] 02/08/2019  ESRD (end stage renal disease) on dialysis ([facility] [address] 02/08/2019  COPD (chronic obstructive pulmonary disease) ([facility] [address] 02/08/2019  Hyperbilirubinemia 02/08/2019 HPI  
52yo BF with ESRD on Hd at [facility] HD under Dr Deonte Brown care admitted for AMS. Last HD on Wednesday. has a past medical history of CAD (coronary artery disease), Chronic obstructive pulmonary disease ([facility] [address] Combined systolic and diastolic heart failure ([facility] [address] Diabetes ([facility] [address] ESRD on dialysis ([facility] [address] HLD (hyperlipidemia), Hypertension, Liver disease, and S/P heart valve repair. Social History Socioeconomic History  Marital status: SINGLE Spouse name: Not on file  Number of children: Not on file  Years of education: Not on file  Highest education level: Not on file Social Needs  Financial resource strain: Not on file  Food insecurity - worry: Not on file  Food insecurity - inability: Not on file  Transportation needs - medical: Not on file  Transportation needs - non-medical: Not on file Occupational History  Not on file Tobacco Use  Smoking status: Unknown If Ever Smoked Substance and Sexual Activity  Alcohol use: No  
  Frequency: Never  Drug use: No  
 Sexual activity: Not on file Other Topics Concern [address] Service Not Asked  Blood Transfusions Not Asked  Caffeine Concern Not Asked  Occupational Exposure Not Asked [employer] Hazards Not Asked  Sleep Concern Not Asked  Stress Concern Not Asked  Weight Concern Not Asked  Special Diet Not Asked  Back Care Not Asked  Exercise Not Asked  Bike Helmet Not Asked  Seat Belt Not Asked  Self-Exams Not Asked Social History Narrative  Not on file History reviewed. No pertinent family history. Current Facility-Administered Medications:  
  cefTRIAXone (ROCEPHIN) 1 g in 0.9% sodium chloride (MBP/ADV) 50 mL MBP, 1 g, IntraVENous, Q24H, Kelsy Mnceill PA 
  LORazepam (ATIVAN) injection 0.5 mg, 0.5 mg, IntraVENous, Q12H PRN, Kelsy Mcneill PA 
  0.9% sodium chloride infusion, 50 mL/hr, IntraVENous, DIALYSIS PRN, Zari Zimmerman MD 
  calcitRIOL (ROCALTROL) capsule 0.5 mcg, 0.5 mcg, Oral, DAILY, Suleman Villalobos MD 
  carvedilol (COREG) tablet 25 mg, 25 mg, Oral, BID WITH MEALS, Dipak Villalobos MD, 25 mg at 02/08/19 1627 
  cinacalcet (SENSIPAR) tablet 30 mg, 30 mg, Oral, DAILY, Suleman Villalobos MD 
  isosorbide mononitrate ER (IMDUR) tablet 60 mg, 60 mg, Oral, 7am, Suleman Villalobos MD 
  hydrALAZINE (APRESOLINE) tablet 25 mg, 25 mg, Oral, TID, Maria Ines Shen MD, 25 mg at 02/08/19 2257   B complex-vitamin C-folic acid (NEPHRO-NACHO) 0.8 mg tab, 1 Tab, Oral, DAILY, Suleman Villalobos MD 
  sevelamer carbonate (RENVELA) tab 800 mg, 800 mg, Oral, TID WITH MEALS, Maria Ines Shen MD, 800 mg at 02/08/19 1628   zolpidem (AMBIEN) tablet 5 mg, 5 mg, Oral, QHS PRN, Maria Ines Shen MD 
  arformoterol (BROVANA) neb solution 15 mcg, 15 mcg, Nebulization, BID RT, 15 mcg at 02/08/19 2144 **AND** budesonide (PULMICORT) 500 mcg/2 ml nebulizer suspension, 500 mcg, Nebulization, BID RT, Maria Ines Shen MD, 500 mcg at 02/08/19 2144 Review of systems not obtained due to patient factors. Objective:  
 
Visit Vitals /67 Pulse 81 Temp 97.8 °F (36.6 °C) Resp 16 Wt 57 kg (125 lb 9.6 oz) SpO2 96% BMI 20.90 kg/m² No intake/output data recorded. Physical Exam:  
GENERAL: cooperative, no distress, appears older than stated age, EYE: negative, LYMPHATIC: Cervical, supraclavicular, and axillary nodes normal. ,  
 THROAT & NECK: normal and no erythema or exudates noted. , 
 LUNG: clear to auscultation bilaterally HEART: regular rate and rhythm, S1, S2 normal, no murmur, click, rub or gallop ABDOMEN: soft, non-tender. Bowel sounds normal. No masses,  no organomegaly EXTREMITIES:  extremities normal, atraumatic, no cyanosis or edema SKIN: Normal. 
 NEUROLOGIC: negative PSYCHIATRIC: non focal 
 
 
 
Data Review: 
 
Lab Results Component Value Date/Time WBC 12.6 02/09/2019 05:48 AM  
 HGB 9.6 (L) 02/09/2019 05:48 AM  
 HCT 28.5 (L) 02/09/2019 05:48 AM  
 PLATELET 412 (L) 85/46/6581 05:48 AM  
 MCV 76.2 02/09/2019 05:48 AM  
 
Lab Results Component Value Date/Time Sodium 135 (L) 02/09/2019 05:48 AM  
 Potassium 5.2 02/09/2019 05:48 AM  
 Chloride 91 (L) 02/09/2019 05:48 AM  
 CO2 27 02/09/2019 05:48 AM  
 Anion gap 17 02/09/2019 05:48 AM  
 Glucose 61 (L) 02/09/2019 05:48 AM  
 BUN 43 (H) 02/09/2019 05:48 AM  
 Creatinine 7.68 (H) 02/09/2019 05:48 AM  
 BUN/Creatinine ratio 6 (L) 02/09/2019 05:48 AM  
 GFR est AA 7 (L) 02/09/2019 05:48 AM  
 GFR est non-AA 6 (L) 02/09/2019 05:48 AM  
 Calcium 9.4 02/09/2019 05:48 AM  
 
No components found for: PTHINT No results found for: IRON Impression: 1. ESRD- MWF HD. HD today because she missed yesterday 2. AMS- per primary 3. Anemia- check iron sat 4. Hyperparathy- Check Phos and PTH Plan:  
 
HD today PTH Phos level 'Iron sat Milo Herrera MD [facility] Office [phone] [phone] [phone]

## 2019-02-09 NOTE — PROGRESS NOTES
Internal Medicine Progress Note Patient's Name: Zeke Camara Admit Date: 2/8/2019 Length of Stay: 1 Assessment/Plan Principal Problem: 
  UTI (urinary tract infection) (2/8/2019) Active Problems: Metabolic encephalopathy (6/7/3797) Cirrhosis ([facility] [address] (2/8/2019) ESRD (end stage renal disease) on dialysis ([facility] [address] (2/8/2019) COPD (chronic obstructive pulmonary disease) ([facility] [address] (2/8/2019) Hyperbilirubinemia (2/8/2019) UTI 
- iv abx 
- UCx - ngtd 
  
Metabolic encephalopathy 
- Head CT - no acute abnormality 
- monitor neuro status 
  
ESRD on dialysis 
- nephro consult - appreciate assistance 
- HD on 2/9 
  
COPD 
- brovanelia pulmicort 
- O2 stable on RA 
  
Hyperbilirubinemia/ elevated alk phos 
- CT abd pending - GI consult - appreciate assistance 
 
 
 
- Cont acceptable home medications for chronic conditions  
- DVT protocol I have personally reviewed all pertinent labs and films that have officially resulted over the last 24 hours. I have personally checked for all pending labs that are awaiting final results. Interval History Zeke Camara is a 46 y.o. female with a PMHx ESRD on dialysis, DM, CAD, COPD, syst/diastolic heart failure, HTN, chronic liver disease who presented to the ED from dialysis due to AMS. History limited 2/2 AMS. ED eval revealed scleral icterus, confusion, leukocytosis, UTI. Hyperbilirubinemia (21.7) and elevated alk phos (571) are similar to recent ED visit at [facility] on 2/1. Review of prior records from [facility] revealed she was supposed to have a percutaneous gallbladder drain placed on 2/4/19. Head CT neg. Pt will be admitted for further evaluation. IV rocephin started. Nephrology consulted - HD on 2/9 as patient missed the prior day. CT abd without contrast ordered. GI consulted. UCx showing ngtd so far. Subjective Pt s/e @ bedside. No major events overnight. Pt agitated and confused. Objective Visit Vitals /64 Pulse 79 Temp 97.4 °F (36.3 °C) (Axillary) Resp 18 Wt 57 kg (125 lb 9.6 oz) SpO2 96% BMI 20.90 kg/m² Physical Exam: 
General Appearance: NAD, HEENT: normocephalic/atraumatic, moist mucus membranes, scleral icterus Neck: No JVD, supple Lungs: CTA with normal respiratory effort CV: RRR, no m/r/g Abdomen: soft, RUQ tenderness, possible RUQ mass, but patient too agitated to fully evaluate, normal bowel sounds Extremities: no cyanosis, no peripheral edema Neuro: No focal deficits, motor/sensory intact Intake and Output: 
Current Shift:  No intake/output data recorded. Last three shifts:  No intake/output data recorded. Lab/Data Reviewed: 
BMP:  
Lab Results Component Value Date/Time  (L) 02/09/2019 05:48 AM  
 K 5.2 02/09/2019 05:48 AM  
 CL 91 (L) 02/09/2019 05:48 AM  
 CO2 27 02/09/2019 05:48 AM  
 AGAP 17 02/09/2019 05:48 AM  
 GLU 61 (L) 02/09/2019 05:48 AM  
 BUN 43 (H) 02/09/2019 05:48 AM  
 CREA 7.68 (H) 02/09/2019 05:48 AM  
 GFRAA 7 (L) 02/09/2019 05:48 AM  
 GFRNA 6 (L) 02/09/2019 05:48 AM  
 
CBC:  
Lab Results Component Value Date/Time WBC 12.6 02/09/2019 05:48 AM  
 HGB 9.6 (L) 02/09/2019 05:48 AM  
 HCT 28.5 (L) 02/09/2019 05:48 AM  
  (L) 02/09/2019 05:48 AM  
 
 
Imaging Reviewed: 
No results found. Medications Reviewed: 
Current Facility-Administered Medications Medication Dose Route Frequency  cefTRIAXone (ROCEPHIN) 1 g in 0.9% sodium chloride (MBP/ADV) 50 mL MBP  1 g IntraVENous Q24H  
 LORazepam (ATIVAN) injection 0.5 mg  0.5 mg IntraVENous Q12H PRN  
 0.9% sodium chloride infusion  50 mL/hr IntraVENous DIALYSIS PRN  
 calcitRIOL (ROCALTROL) capsule 0.5 mcg  0.5 mcg Oral DAILY  carvedilol (COREG) tablet 25 mg  25 mg Oral BID WITH MEALS  cinacalcet (SENSIPAR) tablet 30 mg  30 mg Oral DAILY  isosorbide mononitrate ER (IMDUR) tablet 60 mg  60 mg Oral 7am  
 hydrALAZINE (APRESOLINE) tablet 25 mg  25 mg Oral TID  B complex-vitamin C-folic acid (NEPHRO-NACHO) 0.8 mg tab  1 Tab Oral DAILY  sevelamer carbonate (RENVELA) tab 800 mg  800 mg Oral TID WITH MEALS  zolpidem (AMBIEN) tablet 5 mg  5 mg Oral QHS PRN  
 arformoterol (BROVANA) neb solution 15 mcg  15 mcg Nebulization BID RT And  
 budesonide (PULMICORT) 500 mcg/2 ml nebulizer suspension  500 mcg Nebulization BID RT Haroon Williamson PA-C [address] [place], [place] [address] Office:  [phone] Pager: [phone]

## 2019-02-09 NOTE — PROGRESS NOTES
1520: pt received from E.R via stretcher, confused, on room air,non labored breathing, non tele,admission assessment partially done, bed in low position and locked, call bell within reach 
 
1427: due meds given, tolerated well, continue to monitor,call bell within reach 
 
1822: family at bedside, completed the required documentation, no c/o presented at this time 1925: Bedside shift change report given to [address] [place] [address] (oncoming nurse) by Bar Ness RN(offgoing nurse). Report included the following information SBAR, Kardex, Intake/Output, MAR and Recent Results.

## 2019-02-09 NOTE — DIALYSIS
ACUTE HEMODIALYSIS FLOW SHEET 
 
HEMODIALYSIS ORDERS: Physician: NOE Bah Dialyzer: revaclear   Duration: 3.5 hr  BFR: 350   DFR: 800 Dialysate:  Temp 36 K+   2    Ca+  2.5 Na 140 Bicarb 35 Weight:  57 kg    Patient Chart [x]     Unable to Obtain []   Dry weight/UF Goal: 1500 Access AVG Needle Gauge 15 Heparin []  Bolus      Units    [] Hourly       Units    [x]None Catheter locking solution Pre BP:   109/65    Pulse:     70      Respirations: 18 Temperature:   97.4  Tx: NS       ml/Bolus  Other        [x] N/A Labs: Pre        Post:        [x] N/A Additional Orders(medications, blood products, hypotension management):       [x] N/A [x] Fidencioita Consent Verified CATHETER ACCESS: [x]N/A   []Right   []Left   []IJ     []Fem   []Chest wall  
[] First use X-ray verified     []Tunnel                [] Non Tunneled []No S/S infection  []Redness  []Drainage []Cultured []Swelling []Pain []Medical Aseptic Prep Utilized   []Dressing Changed  [] Biopatch  Date:      
[]Clotted   []Patent   Flows: []Good  []Poor  []Reversed If access problem,  notified: []Yes    []N/A  Date:        
 
GRAFT/FISTULA ACCESS:  []N/A     []Right     [x]Left     [x]UE     []LE [x]AVG   []AVF        []Buttonhole    [x]Medical Aseptic Prep Utilized [x]No S/S infection  []Redness  []Drainage []Cultured []Swelling []Pain Bruit:   [x] Strong    [] Weak       Thrill :   [x] Strong    [] Weak Needle Gauge: 15   Length: 1 If access problem,  notified: []Yes     []N/A  Date:       
Please describe access if present and not used:  
 
 
GENERAL ASSESSMENT:   
LUNGS:  Rate 18 SaO2%        [] N/A    [x] Clear  [] Coarse  [] Crackles  [] Wheezing 
      [] Diminished     Location : []RLL   []LLL    []RUL  []ADINA Cough: []Productive  []Dry  [x]N/A   Respirations:  [x]Easy  []Labored Therapy:  [x]RA  []NC  l/min    Mask: []NRB []Venti       O2% []Ventilator  []Intubated  [] Trach  [] BiPaP CARDIAC: [x]Regular      [] Irregular   [] Pericardial Rub  [] JVD []  Monitored  [] Bedside  [] Remotely monitored [] N/A  Rhythm: EDEMA: [] None  [x]Generalized  [] Pitting [] 1    [] 2    [] 3    [] 4 [] Facial  [] Pedal  []  UE  [] LE  
SKIN:   [x] Warm  [] Hot     [] Cold   [x] Dry     [] Pale   [] Diaphoretic    
             [] Flushed  [] Jaundiced  [] Cyanotic  [] Rash  [] Weeping LOC:    [] Alert      []Oriented:    [] Person     [] Place  []Time 
             [x] Confused  [] Lethargic  [] Medicated  [] Non-responsive GI / ABDOMEN:  [] Flat    [] Distended    [x] Soft    [] Firm   []  Obese 
                           [] Diarrhea  [x] Bowel Sounds  [] Nausea  [] Vomiting  / URINE ASSESSMENT:[] Voiding   [] Oliguria  [x] Anuria   []  Reis [] Incontinent    []  Incontinent Brief      []  Bathroom Privileges PAIN: [x] 0 []1  []2   []3   []4   []5   []6   []7   []8   []9   []10 Scale 0-10  Action/Follow Up: n/a MOBILITY:  [] Amb    [] Amb/Assist    [x] Bed    [] Wheelchair  [] Stretcher All Vitals and Treatment Details on Attached Flowsheet Hospital: [facility] Room # 6410 [] 1st Time Acute  [] Stat  [x] Routine  [] Urgent [x] Acute Room  []  Bedside  [] ICU/CCU  [] ER Isolation Precautions:  [x] Dialysis   [] Airborne   [] Contact    [] Reverse Special Considerations:         [] Blood Consent Verified [x]N/A ALLERGIES:   [x] NKA Code Status:  [x] Full Code  [] DNR  [] Other HBsAg ONLY: Date Drawn 02/08/2019         [x]Negative []Positive []Unknown  2nd RN check :  Lab HBsAb: Date 11/28/2018    [] Susceptible   [x] [id number] []Not Drawn  [] Drawn Current Labs:    Date of Labs: Today [x] Cut and paste current labs here. Results for Donna Figueroa (MRN [id number]) as of 2/9/2019 16:03 Ref. Range 2/9/2019 05:48 WBC Latest Ref Range: 4.6 - 13.2 K/uL 12.6 RBC Latest Ref Range: 4.20 - 5.30 M/uL 3.74 (L) HGB Latest Ref Range: 12.0 - 16.0 g/dL 9.6 (L) HCT Latest Ref Range: 35.0 - 45.0 % 28.5 (L) MCV Latest Ref Range: 74.0 - 97.0 FL 76.2 MCH Latest Ref Range: 24.0 - 34.0 PG 25.7 MCHC Latest Ref Range: 31.0 - 37.0 g/dL 33.7 RDW Latest Ref Range: 11.6 - 14.5 % 23.4 (H) PLATELET Latest Ref Range: 135 - 420 K/uL 113 (L) NEUTROPHILS Latest Ref Range: 40 - 73 % 82 (H) LYMPHOCYTES Latest Ref Range: 21 - 52 % 11 (L) MONOCYTES Latest Ref Range: 3 - 10 % 5 EOSINOPHILS Latest Ref Range: 0 - 5 % 1  
BASOPHILS Latest Ref Range: 0 - 2 % 1 DF Latest Units:   AUTOMATED  
ABS. NEUTROPHILS Latest Ref Range: 1.8 - 8.0 K/UL 10.5 (H)  
ABS. LYMPHOCYTES Latest Ref Range: 0.9 - 3.6 K/UL 1.4  
ABS. MONOCYTES Latest Ref Range: 0.05 - 1.2 K/UL 0.6  
ABS. EOSINOPHILS Latest Ref Range: 0.0 - 0.4 K/UL 0.1 ABS. BASOPHILS Latest Ref Range: 0.0 - 0.1 K/UL 0.1 Sodium Latest Ref Range: 136 - 145 mmol/L 135 (L) Potassium Latest Ref Range: 3.5 - 5.5 mmol/L 5.2 Chloride Latest Ref Range: 100 - 108 mmol/L 91 (L)  
CO2 Latest Ref Range: 21 - 32 mmol/L 27 Anion gap Latest Ref Range: 3.0 - 18 mmol/L 17 Glucose Latest Ref Range: 74 - 99 mg/dL 61 (L) BUN Latest Ref Range: 7.0 - 18 MG/DL 43 (H) Creatinine Latest Ref Range: 0.6 - 1.3 MG/DL 7.68 (H) BUN/Creatinine ratio Latest Ref Range: 12 - 20   6 (L) Calcium Latest Ref Range: 8.5 - 10.1 MG/DL 9.4 Phosphorus Latest Ref Range: 2.5 - 4.9 MG/DL 7.3 (H) GFR est non-AA Latest Ref Range: >60 ml/min/1.73m2 6 (L)  
GFR est AA Latest Ref Range: >60 ml/min/1.73m2 7 (L) Iron Latest Ref Range: 50 - 175 ug/dL 67 TIBC Latest Ref Range: 250 - 450 ug/dL 143 (L) Iron % saturation Latest Units: % 47 PTH INTACT Unknown DIET:  [x] Renal    [] Other     [] NPO     []  Diabetic PRIMARY NURSE REPORT: First initial/Last name/Title Pre Dialysis: Rudy Potter RN    Time: 7628 EDUCATION:   
[x] Patient [] Other         Knowledge Basis: []None []Minimal [] Substantial  
Barriers to learning  []N/A   Altered mental status  
[] Access Care     [] S&S of infection     [] Fluid Management     []K+     [x]Procedural   
[]Albumin     [] Medications     [] Tx Options     [] Transplant     [] Diet     [] Other Teaching Tools:  [x] Explain  [] Demo  [] Handouts [] Video Patient response:   [] Verbalized understanding  [] Teach back  [] Return demonstration [x] Requires follow up Inappropriate due to         
 
[x] Time Out/Safety Check  [x]Extracorporeal Circuit Tested for integrity RO/HEMODIALYSIS MACHINE SAFETY CHECKS  Before each treatment:    
Machine Number:                  
                                                 [employer] [x] Portable Machine #11/RO serial # [id number] [] Portable Machine #12/RO serial # [id number] [] Portable Machine #13/RO serial #  [id number] Alarm Test:  Pass time 1100         Other:        
[x] RO/Machine Log Complete Temp    36 Dialysate: pH  7.2 Conductivity: Meter   14     HD Machine   14.1                  TCD: 13.9 Dialyzer Lot # [id number]            Blood Tubing Lot # [id number]          Saline Lot #  [id number]  
 
CHLORINE TESTING-Before each treatment and every 4 hours Total Chlorine: [] less than 0.1 ppm  Time: 0900 4 Hr/2nd Check Time: 1300  
(if greater than 0.1 ppm from Primary then every 30 minutes from Secondary) TREATMENT INITIATION  with Dialysis Precautions:  
[x] All Connections Secured                 [x] Saline Line Double Clamped  
[x] Venous Parameters Set                  [x] Arterial Parameters Set [x] Prime Given 250mL                         [x]Air Foam Detector Engaged Treatment Initiation Note:Received pt to the treatment area awake but confused. Access site is clean, dry and intact with no s/s of infection. AVG cannulated without difficulty. Treatment started as ordered. During Treatment Notes: 
 
 
  
 
Medication Dose Volume Route Initials Dialyzer Cleared: [x] Good [] Fair  [] Poor Blood processed:  65.2 L 
UF Removed  1400 Ml POst BP:   117/63       Pulse: 74      Respirations: 18  Temperature: 97.6 Post Tx Vascular Access: AVF/AVG: Bleeding stopped Art 20 min. Osmany. [address] [facility] Catheter: Locking solution: Heparin 1:1000 Art. Osmany. N/A Post Assessment:  
  
                              Skin:  [x] Warm  [x] Dry [] Diaphoretic    [] Flushed  [] Pale [] Cyanotic [facility] Signatures Title Initials  Time Lungs: [x] Clear    [] Course  [] Crackles  [] Wheezing [] Diminished Tonya Comfort  RN LG 4107 Cardiac: [x] Regular   [] Irregular   [] Monitor  [] N/A  Rhythm:      
    Edema:  [x] None    [] General     [] Facial   [] Pedal    [] UE    [] LE  
    Pain: [x]0  []1  []2   []3  []4   []5   []6   []7   []8   []9   []10 Post Treatment Note: 
 
 9639: Pt became increasingly agitated and moving arms in a way that threatened needle placement. She had 15 minutes left of her treatment. Treatment ended as noted. POST TREATMENT PRIMARY NURSE HANDOFF REPORT:  
 
First initial/Last name/Title Post Dialysis: Whitney Rojas RN Time:  7264 Abbreviations: AVG-arterial venous graft, AVF-arterial venous fistula, IJ-Internal Jugular, Subcl-Subclavian, Fem-Femoral, Tx-treatment, AP/HR-apical heart rate, DFR-dialysate flow rate, BFR-blood flow rate, AP-arterial pressure, -venous pressure, UF-ultrafiltrate, TMP-transmembrane pressure, Osmany-Venous, Art-Arterial, RO-Reverse Osmosis

## 2019-02-10 PROBLEM — I50.40 COMBINED SYSTOLIC AND DIASTOLIC HEART FAILURE (HCC): Status: ACTIVE | Noted: 2019-02-10

## 2019-02-10 LAB
ALBUMIN SERPL-MCNC: 1.4 G/DL (ref 3.4–5)
ANION GAP SERPL CALC-SCNC: 15 MMOL/L (ref 3–18)
BACTERIA SPEC CULT: NORMAL
BASOPHILS # BLD: 0 K/UL (ref 0–0.1)
BASOPHILS NFR BLD: 0 % (ref 0–2)
BUN SERPL-MCNC: 21 MG/DL (ref 7–18)
BUN/CREAT SERPL: 5 (ref 12–20)
CALCIUM SERPL-MCNC: 8.7 MG/DL (ref 8.5–10.1)
CHLORIDE SERPL-SCNC: 95 MMOL/L (ref 100–108)
CO2 SERPL-SCNC: 27 MMOL/L (ref 21–32)
CREAT SERPL-MCNC: 4.35 MG/DL (ref 0.6–1.3)
DIFFERENTIAL METHOD BLD: ABNORMAL
EOSINOPHIL # BLD: 0.1 K/UL (ref 0–0.4)
EOSINOPHIL NFR BLD: 1 % (ref 0–5)
ERYTHROCYTE [DISTWIDTH] IN BLOOD BY AUTOMATED COUNT: 23.9 % (ref 11.6–14.5)
GLUCOSE SERPL-MCNC: 56 MG/DL (ref 74–99)
HCT VFR BLD AUTO: 30.7 % (ref 35–45)
HGB BLD-MCNC: 10.1 G/DL (ref 12–16)
INR PPP: 2.2 (ref 0.8–1.2)
LYMPHOCYTES # BLD: 1 K/UL (ref 0.9–3.6)
LYMPHOCYTES NFR BLD: 10 % (ref 21–52)
MCH RBC QN AUTO: 26 PG (ref 24–34)
MCHC RBC AUTO-ENTMCNC: 32.9 G/DL (ref 31–37)
MCV RBC AUTO: 79.1 FL (ref 74–97)
MONOCYTES # BLD: 0.4 K/UL (ref 0.05–1.2)
MONOCYTES NFR BLD: 4 % (ref 3–10)
NEUTS SEG # BLD: 9.1 K/UL (ref 1.8–8)
NEUTS SEG NFR BLD: 85 % (ref 40–73)
PHOSPHATE SERPL-MCNC: 4.5 MG/DL (ref 2.5–4.9)
PLATELET # BLD AUTO: 105 K/UL (ref 135–420)
POTASSIUM SERPL-SCNC: 4.1 MMOL/L (ref 3.5–5.5)
PROTHROMBIN TIME: 24 SEC (ref 11.5–15.2)
RBC # BLD AUTO: 3.88 M/UL (ref 4.2–5.3)
SERVICE CMNT-IMP: NORMAL
SODIUM SERPL-SCNC: 137 MMOL/L (ref 136–145)
WBC # BLD AUTO: 10.6 K/UL (ref 4.6–13.2)

## 2019-02-10 PROCEDURE — 74011250637 HC RX REV CODE- 250/637: Performed by: HOSPITALIST

## 2019-02-10 PROCEDURE — 85025 COMPLETE CBC W/AUTO DIFF WBC: CPT

## 2019-02-10 PROCEDURE — 74011250636 HC RX REV CODE- 250/636: Performed by: HOSPITALIST

## 2019-02-10 PROCEDURE — 83516 IMMUNOASSAY NONANTIBODY: CPT

## 2019-02-10 PROCEDURE — 65270000029 HC RM PRIVATE

## 2019-02-10 PROCEDURE — 82390 ASSAY OF CERULOPLASMIN: CPT

## 2019-02-10 PROCEDURE — 86038 ANTINUCLEAR ANTIBODIES: CPT

## 2019-02-10 PROCEDURE — 82105 ALPHA-FETOPROTEIN SERUM: CPT

## 2019-02-10 PROCEDURE — 74011000250 HC RX REV CODE- 250: Performed by: HOSPITALIST

## 2019-02-10 PROCEDURE — 74011000258 HC RX REV CODE- 258: Performed by: HOSPITALIST

## 2019-02-10 PROCEDURE — 74011250637 HC RX REV CODE- 250/637: Performed by: INTERNAL MEDICINE

## 2019-02-10 PROCEDURE — 82103 ALPHA-1-ANTITRYPSIN TOTAL: CPT

## 2019-02-10 PROCEDURE — 82784 ASSAY IGA/IGD/IGG/IGM EACH: CPT

## 2019-02-10 PROCEDURE — 36415 COLL VENOUS BLD VENIPUNCTURE: CPT

## 2019-02-10 PROCEDURE — 85610 PROTHROMBIN TIME: CPT

## 2019-02-10 PROCEDURE — 94640 AIRWAY INHALATION TREATMENT: CPT

## 2019-02-10 PROCEDURE — 80069 RENAL FUNCTION PANEL: CPT

## 2019-02-10 RX ADMIN — ARFORMOTEROL TARTRATE 15 MCG: 15 SOLUTION RESPIRATORY (INHALATION) at 21:26

## 2019-02-10 RX ADMIN — SEVELAMER CARBONATE 800 MG: 800 TABLET, FILM COATED ORAL at 12:16

## 2019-02-10 RX ADMIN — SEVELAMER CARBONATE 800 MG: 800 TABLET, FILM COATED ORAL at 08:55

## 2019-02-10 RX ADMIN — CINACALCET HYDROCHLORIDE 30 MG: 30 TABLET, COATED ORAL at 08:55

## 2019-02-10 RX ADMIN — HYDRALAZINE HYDROCHLORIDE 25 MG: 25 TABLET, FILM COATED ORAL at 18:17

## 2019-02-10 RX ADMIN — BUDESONIDE 500 MCG: 0.5 INHALANT RESPIRATORY (INHALATION) at 21:26

## 2019-02-10 RX ADMIN — LACTULOSE 10 G: 20 SOLUTION ORAL at 09:46

## 2019-02-10 RX ADMIN — CEFTRIAXONE 1 G: 1 INJECTION, POWDER, FOR SOLUTION INTRAMUSCULAR; INTRAVENOUS at 18:00

## 2019-02-10 RX ADMIN — BUDESONIDE 500 MCG: 0.5 INHALANT RESPIRATORY (INHALATION) at 09:03

## 2019-02-10 RX ADMIN — ARFORMOTEROL TARTRATE 15 MCG: 15 SOLUTION RESPIRATORY (INHALATION) at 09:03

## 2019-02-10 RX ADMIN — PHYTONADIONE 5 MG: 10 INJECTION, EMULSION INTRAMUSCULAR; INTRAVENOUS; SUBCUTANEOUS at 09:47

## 2019-02-10 RX ADMIN — RIFAXIMIN 550 MG: 550 TABLET ORAL at 18:00

## 2019-02-10 RX ADMIN — ISOSORBIDE MONONITRATE 60 MG: 60 TABLET, EXTENDED RELEASE ORAL at 08:55

## 2019-02-10 RX ADMIN — SEVELAMER CARBONATE 800 MG: 800 TABLET, FILM COATED ORAL at 18:00

## 2019-02-10 RX ADMIN — HYDRALAZINE HYDROCHLORIDE 25 MG: 25 TABLET, FILM COATED ORAL at 08:55

## 2019-02-10 RX ADMIN — RIFAXIMIN 550 MG: 550 TABLET ORAL at 09:46

## 2019-02-10 RX ADMIN — Medication 1 TABLET: at 08:55

## 2019-02-10 RX ADMIN — CARVEDILOL 25 MG: 25 TABLET, FILM COATED ORAL at 18:00

## 2019-02-10 RX ADMIN — LACTULOSE 10 G: 20 SOLUTION ORAL at 18:17

## 2019-02-10 RX ADMIN — CALCITRIOL 0.5 MCG: 0.25 CAPSULE ORAL at 09:03

## 2019-02-10 RX ADMIN — CARVEDILOL 25 MG: 25 TABLET, FILM COATED ORAL at 08:55

## 2019-02-10 NOTE — CONSULTS
Gastroenterology Consult    Patient: Fredis Fitzpatrick MRN: [id number]  SSN: [id number]    YOB: 1970  Age: 46 y.o. Sex: female        Assessment:   Impression:  -Jaundice with elevated ALP and normal AST/ALT  -Coagulopathy  -Altered mental status   -Urinary tract infection  -Coronary artery disease with systolic and diastolic hear failure  -End stage renal disease on hemodialysis  -Chronic normocytic anemia with FeSat 47%    Ms Indira Gallegos elevated bilirubin and ALP are chronic (4 months) and appear to be secondary to passive hepatic congestion secondary to  right heart failure. However, the degree of lab elevation and the elevated INR are atypical for passive  congestion alone and need to also consider an underlying chronic liver disease/cirrhosis, whether also related to  chronic hepatic congestion or a secondary process. Her altered mental status may be due to her infection, but unable to exclude component of hepatic encphalopathy despite normal ammonia level. There is no evidence of obstructive biliary process. Definitive exclusion of the presence/absence of cirrhosis or portal hypertension would require liver  biopsy and determination of the hepatic venous pressure gradient but it's not clear that this would significant  right now. Whether the elevated bilirubin is primarily secondary to  passive congestion or from underlying cirrhosis, this is a poor prognostic sign. Recommend additional  serologic evaluation for causes of chronic liver disease. Given her persistent encephalopathy, it is reasonable to  Treat empirically with lactulose and rifaximin despite her normal ammonia level. Recommend paracentesis for fluid analysis and  to exclude SBP. Recommend cardiology evaluation to guide additional management for right heart failure and pulmonary hypertension.      Plan:   -Check serologies for causes of chronic liver disease  -Paracentesis for cell count, Gram stain and culture, albumin, protein  -Start lactulose and rifaximin  -Cardiology evaluation  -Continue antibiotics for UTI  -Vitamin K x 3 days      Subjective:    Riana Fraser is a 47 y/o with history of DM, ASCAD s/p stent placement in 2015, COPD, and ESRD on  hemodialysis who was brought to the ER for altered mental status is. She was found to have  leukocytosis and is being treated for a urinary tract infection. GI is being consulted for elevated liver  enzymes. She was sleeping in her bed and opened her eyes and answered a couple questions but otherwise was not willing/able to participate in my interview and history is taken from extensive evaluation of her chart. She has no complaints for me. Nursing reports a bowel movement last night without apparent blood. She had dialysis yesterday. She refused medications overnight. She is jaundice with bilirubin 21.7, , with normal AST/ALT. Her Bilirubin has been  markedly elevated since Oct2018 and was most recently 22.4 earlier in February. Reviewed or care-  everywhere indicates that the elevated bilirubin and ALP has been felt to be secondary to passive  congestion related to heart failure and she has been apparently followed by Dr Karely Wood. Only partial  records area available for review in epic. Her INR has been moderately elevated to 1.4-1.8 but was up to  2.7 on admission here. NH3 is low. Hb is 9.6 from a baseline of 11 with FeSat of 47%. -CT Abd/Pelvis at admission shows hepatomegaly, gallstones without cholecystitis, no biliary dilation,  moderate volume ascites and anasarca.  -She has no known history of underlying liver disease and her liver enzymes were normal in Aug2018. Serologies for hepatitis B and C were negative Nov 2018.   -CT scan [id number] showed cardiomegaly with IVC and hepatic vein distention with salt and pepper  appearance to the liver consistent with passive congestion.  -Echocardiogram [id number] showed dilated right atrium and ventricle, moderate pulmonary  hypertension with EF 38%. BNP has been chronically elevated to &gt;70,000  -RUQ US with PVLs [id number] showed hepatomegaly, moderate ascites, gallstones with GB wall  thickening, CBD 4mm, and pulsatile portovenous flow suggestive of high right heart pressure and  passive venous congestion. No findings to suggest portal hypertension. Past Medical History  Past Medical History:   Diagnosis Date    CAD (coronary artery disease)     Chronic obstructive pulmonary disease (HCC)     Combined systolic and diastolic heart failure (HCC)     [facility]     ESRD on dialysis ([facility]     HLD (hyperlipidemia)     Hypertension     Liver disease     S/P heart valve repair        Past Surgical History  Past Surgical History:   Procedure Laterality Date    CARDIAC SURG PROCEDURE UNLIST         Family History  History reviewed. No pertinent family history.       Social History  Social History     Socioeconomic History    Marital status: SINGLE     Spouse name: Not on file    Number of children: Not on file    Years of education: Not on file    Highest education level: Not on file   Social Needs    Financial resource strain: Not on file    Food insecurity - worry: Not on file    Food insecurity - inability: Not on file    Transportation needs - medical: Not on file   Axcient needs - non-medical: Not on file   Occupational History    Not on file   Tobacco Use    Smoking status: Unknown If Ever Smoked   Substance and Sexual Activity    Alcohol use: No     Frequency: Never    Drug use: No    Sexual activity: Not on file   Other Topics Concern     Service Not Asked    Blood Transfusions Not Asked    Caffeine Concern Not Asked    Occupational Exposure Not Asked    Hobby Hazards Not Asked    Sleep Concern Not Asked    Stress Concern Not Asked    Weight Concern Not Asked    Special Diet Not Asked    Back Care Not Asked    Exercise Not Asked    Bike Helmet Not Asked   [address] Not Asked    Self-Exams Not Asked   Social History Narrative    Not on file         Medications  Current Facility-Administered Medications   Medication Dose Route Frequency    rifAXIMin (XIFAXAN) tablet 550 mg  550 mg Oral BID    lactulose (CHRONULAC) solution 10 g  15 mL Oral TID    phytonadione (VITAMIN K) 1 mg/mL oral solution 5 mg  5 mg Oral DAILY    LORazepam (ATIVAN) injection 0.5 mg  0.5 mg IntraVENous Q12H PRN    0.9% sodium chloride infusion  50 mL/hr IntraVENous DIALYSIS PRN    cefTRIAXone (ROCEPHIN) 1 g in 0.9% sodium chloride (MBP/ADV) 50 mL MBP  1 g IntraVENous Q24H    calcitRIOL (ROCALTROL) capsule 0.5 mcg  0.5 mcg Oral DAILY    carvedilol (COREG) tablet 25 mg  25 mg Oral BID WITH MEALS    cinacalcet (SENSIPAR) tablet 30 mg  30 mg Oral DAILY    isosorbide mononitrate ER (IMDUR) tablet 60 mg  60 mg Oral 7am    hydrALAZINE (APRESOLINE) tablet 25 mg  25 mg Oral TID    B complex-vitamin C-folic acid (NEPHRO-NACHO) 0.8 mg tab  1 Tab Oral DAILY    sevelamer carbonate (RENVELA) tab 800 mg  800 mg Oral TID WITH MEALS    zolpidem (AMBIEN) tablet 5 mg  5 mg Oral QHS PRN    arformoterol (BROVANA) neb solution 15 mcg  15 mcg Nebulization BID RT    And    budesonide (PULMICORT) 500 mcg/2 ml nebulizer suspension  500 mcg Nebulization BID RT        Hospital Problems  Date Reviewed: 2/8/2019          Codes Class Noted POA    Metabolic encephalopathy KKO-[id number]: G93.41  ICD-9-CM: 348.31  2/8/2019 Unknown        * (Principal) UTI (urinary tract infection) ICD-10-CM: N39.0  ICD-9-CM: 599.0  2/8/2019 Unknown        Cirrhosis ([facility] ICD-10-CM: K74.60  ICD-9-CM: 571.5  2/8/2019 Yes        ESRD (end stage renal disease) on dialysis [facility]) ICD-10-CM: N18.6, Z99.2  ICD-9-CM: 585.6, V45.11  2/8/2019 Unknown        COPD (chronic obstructive pulmonary disease) ([facility] ICD-10-CM: J44.9  ICD-9-CM: 496  2/8/2019 Unknown        Hyperbilirubinemia ICD-10-CM: E80.6  ICD-9-CM: 782.4  2/8/2019 Unknown            No Known Allergies    Review of Systems:  Review of systems not obtained due to patient factors. Objective:     Physical Exam:  Visit Vitals  /66   Pulse 69   Temp 97.9 °F (36.6 °C)   Resp 18   Wt 55 kg (121 lb 4.1 oz)   SpO2 97%   BMI 20.18 kg/m²     General appearance: asleep, opens her eye and tells me she is in the hospital but otherwise not cooperative, no distress, appears older stated age  Head: Normocephalic, without obvious abnormality, atraumatic  Eyes: scleral icterus  Neck: supple, symmetrical, trachea midline   Lungs: clear to auscultation bilaterally  Heart: regular rate and rhythm, S1, S2 normal, no murmur, click, rub or gallop  Abdomen: soft, non-tender.  Non-tense ascites apparent on exam, Bowel sounds normal. No masses,  no organomegaly  Extremities: No edema    Skin: Dry   Neurologic: Oriented to \"hospital\" but otherwise would not answer questions for me    Recent Results (from the past 24 hour(s))   RENAL FUNCTION PANEL    Collection Time: 02/10/19  4:31 AM   Result Value Ref Range    Sodium 137 136 - 145 mmol/L    Potassium 4.1 3.5 - 5.5 mmol/L    Chloride 95 (L) 100 - 108 mmol/L    CO2 27 21 - 32 mmol/L    Anion gap 15 3.0 - 18 mmol/L    Glucose 56 (L) 74 - 99 mg/dL    BUN 21 (H) 7.0 - 18 MG/DL    Creatinine 4.35 (H) 0.6 - 1.3 MG/DL    BUN/Creatinine ratio 5 (L) 12 - 20      GFR est AA 13 (L) >60 ml/min/1.73m2    GFR est non-AA 11 (L) >60 ml/min/1.73m2    Calcium 8.7 8.5 - 10.1 MG/DL    Phosphorus 4.5 2.5 - 4.9 MG/DL    Albumin 1.4 (L) 3.4 - 5.0 g/dL   CBC WITH AUTOMATED DIFF    Collection Time: 02/10/19  4:31 AM   Result Value Ref Range    WBC 10.6 4.6 - 13.2 K/uL    RBC 3.88 (L) 4.20 - 5.30 M/uL    HGB 10.1 (L) 12.0 - 16.0 g/dL    HCT 30.7 (L) 35.0 - 45.0 %    MCV 79.1 74.0 - 97.0 FL    MCH 26.0 24.0 - 34.0 PG    MCHC 32.9 31.0 - 37.0 g/dL    RDW 23.9 (H) 11.6 - 14.5 %    PLATELET 469 (L) 235 - 420 K/uL    NEUTROPHILS 85 (H) 40 - 73 %    LYMPHOCYTES 10 (L) 21 - 52 %    MONOCYTES 4 3 - 10 % EOSINOPHILS 1 0 - 5 %    BASOPHILS 0 0 - 2 %    ABS. NEUTROPHILS 9.1 (H) 1.8 - 8.0 K/UL    ABS. LYMPHOCYTES 1.0 0.9 - 3.6 K/UL    ABS. MONOCYTES 0.4 0.05 - 1.2 K/UL    ABS. EOSINOPHILS 0.1 0.0 - 0.4 K/UL    ABS.  BASOPHILS 0.0 0.0 - 0.1 K/UL    DF AUTOMATED           Signed By: Harini Wong MD     February 10, 2019

## 2019-02-10 NOTE — PROGRESS NOTES
Problem: Pressure Injury - Risk of 
Goal: *Prevention of pressure injury Document Honorio Scale and appropriate interventions in the flowsheet. Outcome: Progressing Towards Goal 
Pressure Injury Interventions: 
Sensory Interventions: Assess changes in LOC, Keep linens dry and wrinkle-free, Maintain/enhance activity level, Minimize linen layers Moisture Interventions: Absorbent underpads, Check for incontinence Q2 hours and as needed Activity Interventions: Increase time out of bed, Pressure redistribution bed/mattress(bed type) Mobility Interventions: HOB 30 degrees or less, Pressure redistribution bed/mattress (bed type) Nutrition Interventions: Document food/fluid/supplement intake Friction and Shear Interventions: Minimize layers, HOB 30 degrees or less Problem: Falls - Risk of 
Goal: *Absence of Falls Document Cornelious Booty Fall Risk and appropriate interventions in the flowsheet. Outcome: Progressing Towards Goal 
Fall Risk Interventions: 
Mobility Interventions: Bed/chair exit alarm, Patient to call before getting OOB Mentation Interventions: Bed/chair exit alarm, Adequate sleep, hydration, pain control, Room close to nurse's station Medication Interventions: Patient to call before getting OOB, Teach patient to arise slowly Elimination Interventions: Call light in reach, Patient to call for help with toileting needs History of Falls Interventions: Room close to nurse's station, Door open when patient unattended

## 2019-02-10 NOTE — PROGRESS NOTES
Internal Medicine Progress Note Patient's Name: Derrell Herrera Admit Date: 2/8/2019 Length of Stay: 2 Assessment/Plan Principal Problem: 
  UTI (urinary tract infection) (2/8/2019) Active Problems: Metabolic encephalopathy (1/5/7706) Cirrhosis ([facility] (2/8/2019) ESRD (end stage renal disease) on dialysis ([facility] [address] [facility] (2/8/2019) COPD (chronic obstructive pulmonary disease) ([facility] [address] (2/8/2019) Hyperbilirubinemia (2/8/2019) Combined systolic and diastolic heart failure ([facility] [address] (2/10/2019) UTI 
- iv rocephin x5 days - UCx - negative 
  
Metabolic encephalopathy 
- Head CT - no acute abnormality 
- monitor neuro status, improving 
  
ESRD on dialysis 
- nephro consult - appreciate assistance 
- HD on 2/9 HF 
- cardio consult in AM 
  
Hyperbilirubinemia/ elevated alk phos 
- CT abd results below - GI consult - appreciate assistance - serologies, paracentesis to eval fluid, start lactulose/rifaximin, vit K x3 days COPD 
- brovana, pulmicort 
- O2 stable on RA 
 
 
- Cont acceptable home medications for chronic conditions  
- DVT protocol I have personally reviewed all pertinent labs and films that have officially resulted over the last 24 hours. I have personally checked for all pending labs that are awaiting final results. Interval History Derrell Herrera is a 46 y.o. female with a PMHx ESRD on dialysis, DM, CAD, COPD, syst/diastolic heart failure, HTN, chronic liver disease who presented to the ED from dialysis due to AMS. History limited 2/2 AMS. ED eval revealed scleral icterus, confusion, leukocytosis, UTI. Hyperbilirubinemia (21.7) and elevated alk phos (571) are similar to recent ED visit at [facility] on 2/1. Review of prior records from [facility] revealed she was supposed to have a percutaneous gallbladder drain placed on 2/4/19. Head CT neg. Pt will be admitted for further evaluation. IV rocephin started.  Nephrology consulted - HD on 2/9 as patient missed the prior day. CT abd without contrast ordered. GI consulted - serologies, paracentesis to eval fluid, start lactulose/rifaximin, vit K x3 days, cardio consult. UCx negative. Subjective Pt s/e @ bedside. No major events overnight. Pt more interactive today. Objective Visit Vitals /66 Pulse 69 Temp 97.9 °F (36.6 °C) Resp 18 Wt 55 kg (121 lb 4.1 oz) SpO2 97% BMI 20.18 kg/m² Physical Exam: 
General Appearance: NAD, confused HEENT: normocephalic/atraumatic, moist mucus membranes, scleral icterus Neck: No JVD, supple Lungs: CTA with normal respiratory effort CV: RRR, no m/r/g Abdomen: soft, non-tender, normal bowel sounds Extremities: no cyanosis, no peripheral edema Neuro: No focal deficits, motor/sensory intact Intake and Output: 
Current Shift:  No intake/output data recorded. Last three shifts:  02/08 1901 - 02/10 0700 In: -  
Out: 1400 Lab/Data Reviewed: 
BMP:  
Lab Results Component Value Date/Time  02/10/2019 04:31 AM  
 K 4.1 02/10/2019 04:31 AM  
 CL 95 (L) 02/10/2019 04:31 AM  
 CO2 27 02/10/2019 04:31 AM  
 AGAP 15 02/10/2019 04:31 AM  
 GLU 56 (L) 02/10/2019 04:31 AM  
 BUN 21 (H) 02/10/2019 04:31 AM  
 CREA 4.35 (H) 02/10/2019 04:31 AM  
 GFRAA 13 (L) 02/10/2019 04:31 AM  
 GFRNA 11 (L) 02/10/2019 04:31 AM  
 
CBC:  
Lab Results Component Value Date/Time WBC 10.6 02/10/2019 04:31 AM  
 HGB 10.1 (L) 02/10/2019 04:31 AM  
 HCT 30.7 (L) 02/10/2019 04:31 AM  
  (L) 02/10/2019 04:31 AM  
 
 
Imaging Reviewed: 
Ct Abd Pelv Wo Cont Result Date: 2/9/2019 EXAM: CT of the abdomen and pelvis INDICATION: Hyperbilirubinemia. Elevated alkaline phosphatase. COMPARISON: None TECHNIQUE: Helical volumetric scanning through the abdomen and pelvis was performed without oral or intravenous contrast.  Axial, coronal and sagittal reconstructions were created.  One or more dose reduction techniques were used on this CT: automated exposure control, adjustment of the mAs and/or kVp according to patient's size, and iterative reconstruction techniques. The specific techniques utilized on this CT exam have been documented in the patient's electronic medical record. Digital Imaging and Communications in Medicine (DICOM) format image data are available to nonaffiliated external healthcare facilities or entities on a secure, media free, reciprocally searchable basis with patient authorization for at least a 12-month period after this study. _______________ FINDINGS: LOWER CHEST: Marked global cardiomegaly. No pericardial effusion. Small hiatal hernia is present. Mild dependent atelectasis is present posteriorly within the lung bases. LIVER, BILIARY: Liver is prominent with a sagittal span just over 20 cm. No focal liver lesion appreciated, within the limitations of noncontrast technique. No biliary dilation. Several small gallstones layer in the dependent gallbladder. No convincing signs of cholecystitis. PANCREAS: Within normal range for noncontrast technique. SPLEEN: A couple wedge-shaped hypodensities are present in the spleen, anterior upper pole, mid spleen posterior medially. Borderline splenomegaly. ADRENALS: Normal. KIDNEYS: Mildly atrophic. No hydronephrosis. Calcifications are renal vascular. LYMPH NODES: No enlarged lymph nodes. GASTROINTESTINAL TRACT: No significant bowel distention or wall thickening. PELVIC ORGANS: Within normal limits. VASCULATURE: Extensive calcific atherosclerosis is present. No AAA. BONES: No suspicious osseous lesions. OTHER: Moderate volume ascites is present in the abdomen and pelvis. Diffuse anasarca is present. _______________ IMPRESSION: 1. Hepatomegaly without a focal liver lesion appreciated on this noncontrast exam. 2. Gallstones, no convincing signs of cholecystitis. 3. No biliary dilatation.  4. Wedge-shaped low densities in the spleen, probably small splenic infarct. 5. Moderate volume ascites. Anasarca. 6. Mildly atrophic kidneys bilaterally. 7. Diffuse calcific atherosclerosis. A preliminary report was provided by the radiology resident physician on call at the time of the study. Medications Reviewed: 
Current Facility-Administered Medications Medication Dose Route Frequency  rifAXIMin (XIFAXAN) tablet 550 mg  550 mg Oral BID  lactulose (CHRONULAC) solution 10 g  15 mL Oral TID  phytonadione (VITAMIN K) 1 mg/mL oral solution 5 mg  5 mg Oral DAILY  LORazepam (ATIVAN) injection 0.5 mg  0.5 mg IntraVENous Q12H PRN  
 0.9% sodium chloride infusion  50 mL/hr IntraVENous DIALYSIS PRN  
 cefTRIAXone (ROCEPHIN) 1 g in 0.9% sodium chloride (MBP/ADV) 50 mL MBP  1 g IntraVENous Q24H  calcitRIOL (ROCALTROL) capsule 0.5 mcg  0.5 mcg Oral DAILY  carvedilol (COREG) tablet 25 mg  25 mg Oral BID WITH MEALS  cinacalcet (SENSIPAR) tablet 30 mg  30 mg Oral DAILY  isosorbide mononitrate ER (IMDUR) tablet 60 mg  60 mg Oral 7am  
 hydrALAZINE (APRESOLINE) tablet 25 mg  25 mg Oral TID  B complex-vitamin C-folic acid (NEPHRO-NACHO) 0.8 mg tab  1 Tab Oral DAILY  sevelamer carbonate (RENVELA) tab 800 mg  800 mg Oral TID WITH MEALS  zolpidem (AMBIEN) tablet 5 mg  5 mg Oral QHS PRN  
 arformoterol (BROVANA) neb solution 15 mcg  15 mcg Nebulization BID RT And  
 budesonide (PULMICORT) 500 mcg/2 ml nebulizer suspension  500 mcg Nebulization BID RT Ilia Moreno PA-C [facility] Office:  [phone] Pager: [phone]

## 2019-02-10 NOTE — ANCILLARY DISCHARGE INSTRUCTIONS
Core Measures Patient - RRAT Score is 19, Patient has transitional care follow up on 2/21/19 at 10:15 am.

## 2019-02-10 NOTE — PROGRESS NOTES
Problem: Pressure Injury - Risk of 
Goal: *Prevention of pressure injury Document Honorio Scale and appropriate interventions in the flowsheet. Outcome: Progressing Towards Goal 
Pressure Injury Interventions: 
Sensory Interventions: Assess changes in LOC, Avoid rigorous massage over bony prominences, Keep linens dry and wrinkle-free, Minimize linen layers, Pressure redistribution bed/mattress (bed type) Moisture Interventions: Absorbent underpads, Check for incontinence Q2 hours and as needed, Offer toileting Q_hr Activity Interventions: Pressure redistribution bed/mattress(bed type) Mobility Interventions: HOB 30 degrees or less Nutrition Interventions: Document food/fluid/supplement intake Friction and Shear Interventions: Minimize layers Problem: Falls - Risk of 
Goal: *Absence of Falls Document Leny Stokes Fall Risk and appropriate interventions in the flowsheet. Outcome: Progressing Towards Goal 
Fall Risk Interventions: 
Mobility Interventions: Bed/chair exit alarm, Patient to call before getting OOB Mentation Interventions: Bed/chair exit alarm Medication Interventions: Bed/chair exit alarm, Evaluate medications/consider consulting pharmacy, Patient to call before getting OOB Elimination Interventions: Call light in reach, Bed/chair exit alarm History of Falls Interventions: Door open when patient unattended, Room close to nurse's station

## 2019-02-10 NOTE — PROGRESS NOTES
Patient's nurse Leydi Ventura spoke to a PA, said paracentesis can wait until Monday Feb 11. [employer], [address].

## 2019-02-10 NOTE — PROGRESS NOTES
Bedside shift change report given to RUPINDER De La Cruz (oncoming nurse) by Westley Garduno RN (offgoing nurse). Report included the following information SBAR, Kardex, Intake/Output, MAR and Recent Results. 
   
9525 -- AM medications given, well tolerated, will continue to monitor. No pain noted. 
   
1130-- Reassessment completed, no change in patient condition, will continue to monitor. 1216 -- Med given. 
   
1510 -- Reassessment completed, no change in patient condition, will continue to monitor. 
   
Bedside shift change report given to RUPINDER Villareal (oncoming nurse) by Zofia Peralta RN (offgoing nurse). Report included the following information SBAR, Kardex, Intake/Output, MAR and Recent Results.

## 2019-02-10 NOTE — PROGRESS NOTES
Nutrition initial assessment/Plan of care RECOMMENDATIONS:  
1. Renal Soft Solid Diet 2. Nepro TID 3. Monitor labs, weight and PO intake 4. RD to follow GOALS:  
1. PO intake meets >75% of protein/calorie needs by 2/13 2. Weight Maintenance (+/- 1-2 lb) by  2/17 ASSESSMENT:  
Wt status is classified as normal per BMI of 20.2. However per documented weight records Pt w/ weight loss equating to 18 lb or 13% x ~3 months. Poor PO intake. Nepro TID to supplement energy needs. Labs noted. Pt w/ hypoalbuminemia, elevate BIli (21.7), elevated Alk Phos (571) and elevated BUN/Cr; GFR (13). Nutrition recommendations listed. RD to follow. Nutrition Diagnoses:  
Unintentional weight loss related to inadequate energy intake PTA as evidenced by an 18 lb or 13% x ~3 months per documented weight records. Inadequate energy intake related to decreased appetite as evidenced by diet recall over the past couple of months. Meets Criteria for Chronic Malnutrition  
[x] Severe Malnutrition, as evidenced by: 
 [x] Severe muscle wasting, loss of subcutaneous fat 
 [x] Nutritional intake of <75% of recommended intake for >1 month 
 [x] Weight loss of  >5% in 1 month, >7.5% in 3 months, >10% in 6 months, >20% in 1 year 
 [] Severe edema Nutrition Risk:  [x] High  [] Moderate []  Low SUBJECTIVE/OBJECTIVE:  
 Pt admitted for UTI/metabolic encephalopathy. Pt w/ ESRD on HD (MWF). Last HD yesterday w/ 1400 mL UF removed. GI consulted d/t Pt w/ jaundice eyes and elevated hepatic panel. Noted per documented weight records Pt w/ weight loss equating to 18 lb or 13% x ~3 months. Pt seen in room with family at bedside to provide Hx. Pt appears thin with noted muscle wasting and SQ fat loss of extremities. Denies having any problems chewing/swallowing , but Pt wears dentures and not here so will order soft solid.  Report Pt w/ poor appetite for the past 2 months and minimal PO intake. Stated Pt does receive Nepro on her dialysis days at home. Will order Nepro TID to supplement energy needs d/t poor PO intake. Family also requesting some information as far as tips to increase PO intake. Verbally discussed some tips but explained would follow up with handouts once all information was provided after testing completed. Continue to encourage intake and will monitor. Information Obtained from:   
 [x] Chart Review [x] Patient [x] Family/Caregiver 
 [] Nurse/Physician 
 [] Interdisciplinary Meeting/Rounds Diet: Renal Diet Medications: [x] Reviewed Nephrovite, Calcitrol, lactulose, renvela Allergies: [x] Reviewed Encounter Diagnoses ICD-10-CM ICD-9-CM 1. Urinary tract infection without hematuria, site unspecified N39.0 599.0 2. Hyperbilirubinemia E80.6 782.4 3. Confusion R41.0 298.9 4. Hemodialysis patient ([facility] Z99.2 V45.11 Past Medical History:  
Diagnosis Date  CAD (coronary artery disease)  Chronic obstructive pulmonary disease ([facility]  Combined systolic and diastolic heart failure ([facility]  Diabetes ([facility]  ESRD on dialysis ([facility]  HLD (hyperlipidemia)  Hypertension  Liver disease  S/P heart valve repair Labs:   
Lab Results Component Value Date/Time Sodium 137 02/10/2019 04:31 AM  
 Potassium 4.1 02/10/2019 04:31 AM  
 Chloride 95 (L) 02/10/2019 04:31 AM  
 CO2 27 02/10/2019 04:31 AM  
 Anion gap 15 02/10/2019 04:31 AM  
 Glucose 56 (L) 02/10/2019 04:31 AM  
 BUN 21 (H) 02/10/2019 04:31 AM  
 Creatinine 4.35 (H) 02/10/2019 04:31 AM  
 Calcium 8.7 02/10/2019 04:31 AM  
 Phosphorus 4.5 02/10/2019 04:31 AM  
 Albumin 1.4 (L) 02/10/2019 04:31 AM  
 
Anthropometrics: BMI (calculated): 20.2 Last 3 Recorded Weights in this Encounter 02/08/19 4360 02/09/19 0050 02/10/19 7037 Weight: 61.2 kg (135 lb) 57 kg (125 lb 9.6 oz) 55 kg (121 lb 4.1 oz) Ht Readings from Last 1 Encounters:  
02/10/19 5' 5\" (1.651 m) Weight Metrics 2/10/2019 10/30/2018 Weight 121 lb 4.1 oz 139 lb BMI 20.18 kg/m2 23.13 kg/m2 No data found. IBW: 125 lb %IBW: 97% UBW: 130s lb [x] Weight Loss [] Weight Gain [] Weight Stable Estimated Nutrition Needs: [x] MSJ  [] Other: 
Calories: 2247-7712 kcal Based on:   [x] Actual BW   
Protein:   66-77 g Based on:   [x] Actual BW Fluid:       UO + 1000 ml Based on:   [x] Actual BW  
 
 [x] No Cultural, Mormon or ethnic dietary need identified. [] Cultural, Mormon and ethnic food preferences identified and addressed Wt Status:  [x] Normal (18.6 - 24.9) [] Underweight (<18.5) [] Overweight (25 - 29.9) [] Mild Obesity (30 - 34.9)  [] Moderate Obesity (35 - 39.9) [] Morbid Obesity (40+) Nutrition Problems Identified:  
[x] Suboptimal PO intake  
[] Food Allergies [x] Difficulty chewing/swallowing/poor dentition 
[] Constipation/Diarrhea  
[] Nausea/Vomiting  
[] None 
[] Other:  
 
Plan:  
[x] Therapeutic Diet 
[]  Obtained/adjusted food preferences/tolerances and/or snacks options [x]  Supplements added  
[] Occupational therapy following for feeding techniques []  HS snack added  
[x]  Modify diet texture  
[]  Modify diet for food allergies [x]  Educate patient/family once all testing finished 
[]  Assist with menu selection  
[x]  Monitor PO intake on meal rounds  
[x]  Continue inpatient monitoring and intervention  
[]  Participated in discharge planning/Interdisciplinary rounds/Team meetings  
[]  Other:  
 
Education Needs: 
 [] Not appropriate for teaching at this time due to: 
 [x] Identified and addressed Nutrition Monitoring and Evaluation: 
[x] Continue ongoing monitoring and intervention 
[] Francia Garcia

## 2019-02-10 NOTE — PROGRESS NOTES
1920  Bedside shift change report given to Dionna BUCIO (oncoming nurse) by Brigido Florez (offgoing nurse). Report included the following information SBAR, Kardex, Intake/Output, MAR. 
 
1950  Shift assessment, denies pain. Resting in bed. Bed alarm continues. 0000  In bed asleep, no distress noted. 0520  In bed sleeping. Pt made 2 or 3 attempt during the night to get OOB, bed alarm on. 
 
0630  Pt slept for most part of the night, when awake attempts to get OOB but follows redirection. 
  
Bedside shift report given to RUPINDER De La Cruz by me.

## 2019-02-11 ENCOUNTER — APPOINTMENT (OUTPATIENT)
Dept: CT IMAGING | Age: 49
DRG: 871 | End: 2019-02-11
Attending: HOSPITALIST
Payer: MEDICARE

## 2019-02-11 ENCOUNTER — APPOINTMENT (OUTPATIENT)
Dept: NON INVASIVE DIAGNOSTICS | Age: 49
DRG: 871 | End: 2019-02-11
Attending: INTERNAL MEDICINE
Payer: MEDICARE

## 2019-02-11 ENCOUNTER — APPOINTMENT (OUTPATIENT)
Dept: ULTRASOUND IMAGING | Age: 49
DRG: 871 | End: 2019-02-11
Attending: INTERNAL MEDICINE
Payer: MEDICARE

## 2019-02-11 LAB
ALBUMIN FLD-MCNC: 0.7 G/DL
ALBUMIN SERPL-MCNC: 1.3 G/DL (ref 3.4–5)
ANION GAP SERPL CALC-SCNC: 13 MMOL/L (ref 3–18)
APPEARANCE FLD: ABNORMAL
BASOPHILS # BLD: 0 K/UL (ref 0–0.1)
BASOPHILS NFR BLD: 0 % (ref 0–2)
BUN SERPL-MCNC: 29 MG/DL (ref 7–18)
BUN/CREAT SERPL: 6 (ref 12–20)
CALCIUM SERPL-MCNC: 7.9 MG/DL (ref 8.5–10.1)
CHLORIDE SERPL-SCNC: 96 MMOL/L (ref 100–108)
CO2 SERPL-SCNC: 28 MMOL/L (ref 21–32)
COLOR FLD: YELLOW
CREAT SERPL-MCNC: 5.22 MG/DL (ref 0.6–1.3)
DIFFERENTIAL METHOD BLD: ABNORMAL
ECHO PULMONARY ARTERY SYSTOLIC PRESSURE (PASP): 40 MMHG
EOSINOPHIL # BLD: 0 K/UL (ref 0–0.4)
EOSINOPHIL NFR BLD: 0 % (ref 0–5)
EOSINOPHIL NFR FLD MANUAL: 0 %
ERYTHROCYTE [DISTWIDTH] IN BLOOD BY AUTOMATED COUNT: 24.1 % (ref 11.6–14.5)
GLUCOSE SERPL-MCNC: 63 MG/DL (ref 74–99)
HBV SURFACE AB SER QL IA: POSITIVE
HBV SURFACE AB SERPL IA-ACNC: >1000 MIU/ML
HCT VFR BLD AUTO: 26.7 % (ref 35–45)
HEP BS AB COMMENT,HBSAC: NORMAL
HGB BLD-MCNC: 8.9 G/DL (ref 12–16)
INR PPP: 1.7 (ref 0.8–1.2)
LYMPHOCYTES # BLD: 1 K/UL (ref 0.9–3.6)
LYMPHOCYTES NFR BLD: 11 % (ref 21–52)
LYMPHOCYTES NFR FLD: 6 %
MACROPHAGES NFR FLD: 13 %
MCH RBC QN AUTO: 25.6 PG (ref 24–34)
MCHC RBC AUTO-ENTMCNC: 33.3 G/DL (ref 31–37)
MCV RBC AUTO: 76.9 FL (ref 74–97)
MONOCYTES # BLD: 0.5 K/UL (ref 0.05–1.2)
MONOCYTES NFR BLD: 6 % (ref 3–10)
MONOCYTES NFR FLD: 0 %
NEUTROPHILS NFR FLD: 81 %
NEUTS BAND # FLD: 0 %
NEUTS SEG # BLD: 7.8 K/UL (ref 1.8–8)
NEUTS SEG NFR BLD: 83 % (ref 40–73)
NUC CELL # FLD: 960 /CU MM
PHOSPHATE SERPL-MCNC: 4.9 MG/DL (ref 2.5–4.9)
PLATELET # BLD AUTO: 103 K/UL (ref 135–420)
POTASSIUM SERPL-SCNC: 3.9 MMOL/L (ref 3.5–5.5)
PROT FLD-MCNC: 2.4 G/DL
PROTHROMBIN TIME: 19.5 SEC (ref 11.5–15.2)
RBC # BLD AUTO: 3.47 M/UL (ref 4.2–5.3)
RBC # FLD: 4150 /CU MM
SODIUM SERPL-SCNC: 137 MMOL/L (ref 136–145)
SPECIMEN SOURCE FLD: ABNORMAL
SPECIMEN SOURCE FLD: NORMAL
SPECIMEN SOURCE FLD: NORMAL
WBC # BLD AUTO: 9.5 K/UL (ref 4.6–13.2)

## 2019-02-11 PROCEDURE — 74011000250 HC RX REV CODE- 250: Performed by: HOSPITALIST

## 2019-02-11 PROCEDURE — 82042 OTHER SOURCE ALBUMIN QUAN EA: CPT

## 2019-02-11 PROCEDURE — 80069 RENAL FUNCTION PANEL: CPT

## 2019-02-11 PROCEDURE — 97162 PT EVAL MOD COMPLEX 30 MIN: CPT

## 2019-02-11 PROCEDURE — 36415 COLL VENOUS BLD VENIPUNCTURE: CPT

## 2019-02-11 PROCEDURE — C1729 CATH, DRAINAGE: HCPCS

## 2019-02-11 PROCEDURE — 74011250636 HC RX REV CODE- 250/636: Performed by: RADIOLOGY

## 2019-02-11 PROCEDURE — 74011250637 HC RX REV CODE- 250/637: Performed by: HOSPITALIST

## 2019-02-11 PROCEDURE — 85610 PROTHROMBIN TIME: CPT

## 2019-02-11 PROCEDURE — 89051 BODY FLUID CELL COUNT: CPT

## 2019-02-11 PROCEDURE — 74011250636 HC RX REV CODE- 250/636: Performed by: HOSPITALIST

## 2019-02-11 PROCEDURE — 74011250637 HC RX REV CODE- 250/637: Performed by: INTERNAL MEDICINE

## 2019-02-11 PROCEDURE — 74011000258 HC RX REV CODE- 258: Performed by: HOSPITALIST

## 2019-02-11 PROCEDURE — 85025 COMPLETE CBC W/AUTO DIFF WBC: CPT

## 2019-02-11 PROCEDURE — 65270000029 HC RM PRIVATE

## 2019-02-11 PROCEDURE — 87070 CULTURE OTHR SPECIMN AEROBIC: CPT

## 2019-02-11 PROCEDURE — 94640 AIRWAY INHALATION TREATMENT: CPT

## 2019-02-11 PROCEDURE — 70450 CT HEAD/BRAIN W/O DYE: CPT

## 2019-02-11 PROCEDURE — 97530 THERAPEUTIC ACTIVITIES: CPT

## 2019-02-11 PROCEDURE — 93308 TTE F-UP OR LMTD: CPT

## 2019-02-11 PROCEDURE — 0W9G3ZZ DRAINAGE OF PERITONEAL CAVITY, PERCUTANEOUS APPROACH: ICD-10-PCS | Performed by: RADIOLOGY

## 2019-02-11 PROCEDURE — 84157 ASSAY OF PROTEIN OTHER: CPT

## 2019-02-11 RX ORDER — LIDOCAINE HYDROCHLORIDE 10 MG/ML
10 INJECTION, SOLUTION EPIDURAL; INFILTRATION; INTRACAUDAL; PERINEURAL
Status: COMPLETED | OUTPATIENT
Start: 2019-02-11 | End: 2019-02-11

## 2019-02-11 RX ADMIN — ISOSORBIDE MONONITRATE 60 MG: 60 TABLET, EXTENDED RELEASE ORAL at 10:31

## 2019-02-11 RX ADMIN — LIDOCAINE HYDROCHLORIDE 10 ML: 10 INJECTION, SOLUTION EPIDURAL; INFILTRATION; INTRACAUDAL; PERINEURAL at 14:10

## 2019-02-11 RX ADMIN — CINACALCET HYDROCHLORIDE 30 MG: 30 TABLET, COATED ORAL at 10:31

## 2019-02-11 RX ADMIN — CALCITRIOL 0.5 MCG: 0.25 CAPSULE ORAL at 10:34

## 2019-02-11 RX ADMIN — CARVEDILOL 25 MG: 25 TABLET, FILM COATED ORAL at 10:31

## 2019-02-11 RX ADMIN — ARFORMOTEROL TARTRATE 15 MCG: 15 SOLUTION RESPIRATORY (INHALATION) at 21:17

## 2019-02-11 RX ADMIN — RIFAXIMIN 550 MG: 550 TABLET ORAL at 10:31

## 2019-02-11 RX ADMIN — LACTULOSE 10 G: 20 SOLUTION ORAL at 10:31

## 2019-02-11 RX ADMIN — SEVELAMER CARBONATE 800 MG: 800 TABLET, FILM COATED ORAL at 10:31

## 2019-02-11 RX ADMIN — Medication 1 TABLET: at 10:31

## 2019-02-11 RX ADMIN — PHYTONADIONE 5 MG: 10 INJECTION, EMULSION INTRAMUSCULAR; INTRAVENOUS; SUBCUTANEOUS at 10:31

## 2019-02-11 RX ADMIN — HYDRALAZINE HYDROCHLORIDE 25 MG: 25 TABLET, FILM COATED ORAL at 10:31

## 2019-02-11 RX ADMIN — CEFTRIAXONE 1 G: 1 INJECTION, POWDER, FOR SOLUTION INTRAMUSCULAR; INTRAVENOUS at 17:23

## 2019-02-11 RX ADMIN — BUDESONIDE 500 MCG: 0.5 INHALANT RESPIRATORY (INHALATION) at 21:16

## 2019-02-11 NOTE — PROGRESS NOTES
Problem: Pressure Injury - Risk of 
Goal: *Prevention of pressure injury Document Honorio Scale and appropriate interventions in the flowsheet. Outcome: Progressing Towards Goal 
Pressure Injury Interventions: 
Sensory Interventions: Keep linens dry and wrinkle-free, Avoid rigorous massage over bony prominences, Check visual cues for pain Moisture Interventions: Absorbent underpads Activity Interventions: Pressure redistribution bed/mattress(bed type), PT/OT evaluation Mobility Interventions: HOB 30 degrees or less, Pressure redistribution bed/mattress (bed type), PT/OT evaluation Nutrition Interventions: Document food/fluid/supplement intake Friction and Shear Interventions: Minimize layers Problem: Falls - Risk of 
Goal: *Absence of Falls Document Marii Perez Fall Risk and appropriate interventions in the flowsheet. Outcome: Progressing Towards Goal 
Fall Risk Interventions: 
Mobility Interventions: Patient to call before getting OOB Mentation Interventions: Door open when patient unattended Medication Interventions: Bed/chair exit alarm, Patient to call before getting OOB, Evaluate medications/consider consulting pharmacy Elimination Interventions: Call light in reach, Bed/chair exit alarm History of Falls Interventions: Bed/chair exit alarm

## 2019-02-11 NOTE — PROGRESS NOTES
Bedside shift change report given to RUPINDER De La Cruz (oncoming nurse) by Charly Connor RN (offgoing nurse). Report included the following information SBAR, Kardex, Intake/Output, MAR and Recent Results. 
   
1000 -- Received telephone consent form daughter Rafaela Montoya) by Pato Juarez RN and Dorothy RN. 
 
4140 -- AM medications given, well tolerated, will continue to monitor. NO pain noted. 
   
1100 -- Reassessment completed, no change in patient condition, will continue to monitor. 1140 -- Patient off the unit. 1230 -- Tried to call patient's daughter for MRI screening, got no answer, will try again later. 1432 -- Patient back on the unit. 
   
1546 -- Reassessment completed, no change in patient condition, will continue to monitor. 1723 -- Patient smacked the Lactulose out of my hand, and put the pills in her mouth and then spit them out of her mouth. 
   
Bedside shift change report given to Lalo Sams, [address]) by Sivakumar Ellis RN (offgoing nurse). Report included the following information SBAR, Kardex, Intake/Output, MAR and Recent Results.

## 2019-02-11 NOTE — PROGRESS NOTES
Internal Medicine Progress Note Patient's Name: Angie Gordon Admit Date: 2/8/2019 Length of Stay: 3 Assessment/Plan Principal Problem: 
  UTI (urinary tract infection) (2/8/2019) Active Problems: Metabolic encephalopathy (3/7/0421) Cirrhosis ([facility] [address] (2/8/2019) ESRD (end stage renal disease) on dialysis ([facility] [address] (2/8/2019) COPD (chronic obstructive pulmonary disease) ([facility] [address] (2/8/2019) Hyperbilirubinemia (2/8/2019) Combined systolic and diastolic heart failure ([facility] [address] (2/10/2019) UTI 
- iv rocephin x5 days - UCx - negative 
  
Metabolic encephalopathy 
- Head CT - no acute abnormality 
- monitor neuro status, improving 
  
ESRD on dialysis 
- nephro consult - appreciate assistance 
- HD on 2/9 HF 
- cardio consult - appreciate assistance 
  
Hyperbilirubinemia/ elevated alk phos 
- CT abd results below - GI consult - appreciate assistance - serologies, paracentesis to eval fluid, start lactulose/rifaximin, vit K x3 days - paracentesis done 2/11 - 100cc removed COPD 
- brovana, pulmicort 
- O2 stable on RA 
 
 
- PT recommending SNF 
- Cont acceptable home medications for chronic conditions  
- DVT protocol I have personally reviewed all pertinent labs and films that have officially resulted over the last 24 hours. I have personally checked for all pending labs that are awaiting final results. Interval History Angie Gordon is a 46 y.o. female with a PMHx ESRD on dialysis, DM, CAD, COPD, syst/diastolic heart failure, HTN, chronic liver disease who presented to the ED from dialysis due to AMS. History limited 2/2 AMS. ED eval revealed scleral icterus, confusion, leukocytosis, UTI. Hyperbilirubinemia (21.7) and elevated alk phos (571) are similar to recent ED visit at [facility] on 2/1.  Review of prior records from [facility] revealed she was supposed to have a percutaneous gallbladder drain placed on 2/4/19. Head CT neg. Pt will be admitted for further evaluation. IV rocephin started. Nephrology consulted - HD on 2/9 as patient missed the prior day. CT abd without contrast ordered. GI consulted - serologies, paracentesis to eval fluid, start lactulose/rifaximin, vit K x3 days, cardio consult. UCx negative. PT recommending SNF. Paracentesis performed 2/11 - removed 100cc dark jed fluid. Subjective Pt s/e @ bedside. No major events overnight. Pt more interactive today. Objective Visit Vitals BP 92/47 (BP 1 Location: Right arm, BP Patient Position: At rest) Pulse 60 Temp 97.6 °F (36.4 °C) Resp 18 Ht 5' 5\" (1.651 m) Wt 54.9 kg (121 lb) SpO2 95% BMI 20.14 kg/m² Physical Exam: 
General Appearance: NAD, confused HEENT: normocephalic/atraumatic, moist mucus membranes, scleral icterus Neck: No JVD, supple Lungs: CTA with normal respiratory effort CV: RRR, no m/r/g Abdomen: soft, non-tender, normal bowel sounds Extremities: no cyanosis, no peripheral edema Neuro: No focal deficits, motor/sensory intact Intake and Output: 
Current Shift:  No intake/output data recorded. Last three shifts:  No intake/output data recorded. Lab/Data Reviewed: 
BMP:  
Lab Results Component Value Date/Time  02/11/2019 05:40 AM  
 K 3.9 02/11/2019 05:40 AM  
 CL 96 (L) 02/11/2019 05:40 AM  
 CO2 28 02/11/2019 05:40 AM  
 AGAP 13 02/11/2019 05:40 AM  
 GLU 63 (L) 02/11/2019 05:40 AM  
 BUN 29 (H) 02/11/2019 05:40 AM  
 CREA 5.22 (H) 02/11/2019 05:40 AM  
 GFRAA 11 (L) 02/11/2019 05:40 AM  
 GFRNA 9 (L) 02/11/2019 05:40 AM  
 
CBC:  
Lab Results Component Value Date/Time WBC 9.5 02/11/2019 05:40 AM  
 HGB 8.9 (L) 02/11/2019 05:40 AM  
 HCT 26.7 (L) 02/11/2019 05:40 AM  
  (L) 02/11/2019 05:40 AM  
 
 
Imaging Reviewed: 
Ct Head Wo Cont Result Date: 2/11/2019 EXAM: CT head CLINICAL INDICATION/HISTORY: Status post fall with trauma to head. COMPARISON: 2/8/2019. TECHNIQUE: Axial CT imaging of the head  was obtained from skull base to vertex without intravenous contrast. Coronal and sagittal reconstructions were obtained. One or more dose reduction techniques were used on this CT: automated exposure control, adjustment of the mAs and/or kVp according to patient's size, and iterative reconstruction techniques. The specific techniques utilized on this CT exam have been documented in the patient's electronic medical record. Digital Imaging and Communications in Medicine (DICOM) format image data are available to nonaffiliated external healthcare facilities or entities on a secure, media free, reciprocally searchable basis with patient authorization for at least a 12-month period after this study. _______________ FINDINGS: BRAIN AND POSTERIOR FOSSA: The sulci, folia, ventricles and basal cisterns are within normal limits for the patient?s age. There is no intracranial hemorrhage, mass effect, or shift of midline structures. Chronic areas of infarct right occipital lobe and left parietal lobe. No new cortical or white matter hypodensity. Bilateral carotid siphon atherosclerosis. EXTRA-AXIAL SPACES AND MENINGES: There are no abnormal extra-axial fluid collections. CALVARIUM: No acute osseous abnormality. SINUSES: The visualized portions of the paranasal sinuses and mastoid air cells are well aerated. OTHER: None. _______________ IMPRESSION: 1. No acute intracranial abnormalities are identified. No CT evidence to suggest acute intracranial hematoma, cortical infarct, or mass effect/mass lesion. 2. 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 Us Guide Paracentesis Result Date: 2/11/2019 Ultrasound guided paracentesis Indication: Ascites, diagnostic paracentesis.  Comments: Procedure, risks, benefits, and alternatives were discussed with patient's daughter over the phone and informed, witnessed consent was obtained. Localizing sonogram was performed and skin was localized under ultrasound guidance. Sterile ultrasound probe cover and ultrasound gel were utilized. Time out was observed. The patient was prepped and draped in sterile fashion. Cutaneous anesthesia was achieved with Lidocaine 1%. An 5 Fr Yueh catheter was advanced into the peritoneal cavity from a right lateral approach under US guidance. 100 mL of dark jed ascites fluid was obtained and submitted for laboratory evaluation. The patient tolerated the procedure well without complication. GUIDANCE: ultrasound guidance was used to position (and confirm the position of) the needle. Image(s) saved in PACS: Ultrasound. Impression: Successful ultrasound guided 100 cc paracentesis as described. Medications Reviewed: 
Current Facility-Administered Medications Medication Dose Route Frequency  rifAXIMin (XIFAXAN) tablet 550 mg  550 mg Oral BID  lactulose (CHRONULAC) solution 10 g  15 mL Oral TID  phytonadione (VITAMIN K) 1 mg/mL oral solution 5 mg  5 mg Oral DAILY  LORazepam (ATIVAN) injection 0.5 mg  0.5 mg IntraVENous Q12H PRN  
 0.9% sodium chloride infusion  50 mL/hr IntraVENous DIALYSIS PRN  
 cefTRIAXone (ROCEPHIN) 1 g in 0.9% sodium chloride (MBP/ADV) 50 mL MBP  1 g IntraVENous Q24H  carvedilol (COREG) tablet 25 mg  25 mg Oral BID WITH MEALS  
 B complex-vitamin C-folic acid (NEPHRO-NACHO) 0.8 mg tab  1 Tab Oral DAILY  sevelamer carbonate (RENVELA) tab 800 mg  800 mg Oral TID WITH MEALS  zolpidem (AMBIEN) tablet 5 mg  5 mg Oral QHS PRN  
 arformoterol (BROVANA) neb solution 15 mcg  15 mcg Nebulization BID RT And  
 budesonide (PULMICORT) 500 mcg/2 ml nebulizer suspension  500 mcg Nebulization BID RT Keanu Rojas PA-C [address] [place], [place] [address] Office:  [phone] Pager: [phone]

## 2019-02-11 NOTE — PROGRESS NOTES
ACUTE HEMODIALYSIS FLOW SHEET 
 
HEMODIALYSIS ORDERS: Physician: NOE De Jesus MD 
  
 
 
Per nephrologist, the patient's Monday dialysis treatment will be deferred to Tuesday, as the patient was given her regimen of blood pressure medication just prior to her dialysis orders being done. The patient's blood pressure was 94/53 with a pulse of 60 prior to receiving her BP medications. Umm Signatures Title Initials  Time Izzy Vargas RN

## 2019-02-11 NOTE — PROGRESS NOTES
PROGRESS NOTE PATIENT:  Dhruv Isaac MRN: [id number] [facility], [birth year] 
         2/11/2019, 6:21 PM 
  
 
SUBJECTIVE: 
 
Lethargic. Voices no specific complaints. Poor oral intake. OBJECTIVE: 
Patient Vitals for the past 24 hrs: 
 Temp Pulse Resp BP SpO2  
02/11/19 1546 97.6 °F (36.4 °C) 60 18 92/47 95 % 02/11/19 1412    94/55   
02/11/19 1156 98 °F (36.7 °C) 60 18 94/55 94 % 02/11/19 1038    106/78   
02/11/19 0839 97.8 °F (36.6 °C) 60 16 94/53   
02/11/19 0454 97.8 °F (36.6 °C) 62 18 100/50 98 % 02/11/19 0150 97.8 °F (36.6 °C) (!) 58 18 104/62 94 % 02/11/19 0037 97.4 °F (36.3 °C) 60 18 100/58 98 % 02/10/19 2143  60  92/49   
02/10/19 2128     99 % 02/10/19 2026 97.6 °F (36.4 °C) (!) 59 16 91/47 95 % 02/10/19 1937 97.6 °F (36.4 °C) 61 16 91/50 100 % No intake or output data in the 24 hours ending 02/11/19 1821 Gen: Lethargic Heent: Scleral icterus Lungs: Clear B/L  
CVS exam: Regular rate and rhythm Abd  : Distended with ascites, mildly tender in the RUQ. Labs: Results:  
Chemistry Recent Labs  
  02/11/19 
0540 02/10/19 
0431 02/09/19 
0976 GLU 63* 56* 61*  137 135* K 3.9 4.1 5.2 CL 96* 95* 91* CO2 28 27 27 BUN 29* 21* 43* CREA 5.22* 4.35* 7.68* CA 7.9* 8.7 9.2  9.4 AGAP 13 15 17 BUCR 6* 5* 6* ALB 1.3* 1.4*  --   
 Estimated Creatinine Clearance: 11.2 mL/min (A) (based on SCr of 5.22 mg/dL (H)). CBC w/Diff Recent Labs  
  02/11/19 
0540 02/10/19 
0431 02/09/19 
2199 WBC 9.5 10.6 12.6 RBC 3.47* 3.88* 3.74* HGB 8.9* 10.1* 9.6* HCT 26.7* 30.7* 28.5*  
* 105* 113* GRANS 83* 85* 82* LYMPH 11* 10* 11* EOS 0 1 1 Cardiac Enzymes No results for input(s): CPK, CKND1, NOEL in the last 72 hours. No lab exists for component: Huan Maria Coagulation Recent Labs  
  02/11/19 
0540 02/10/19 
1305 PTP 19.5* 24.0* INR 1.7* 2.2* Hepatitis Panel Lab Results Component Value Date/Time Hepatitis B surface Ag <0.10 02/08/2019 06:05 AM  
 Hepatitis B surface Ab >1,000.00 02/08/2019 06:05 AM  
  
Amylase Lipase Liver Enzymes Recent Labs  
  02/11/19 
0540 02/10/19 
0431 ALB 1.3* 1.4* Thyroid Studies No results for input(s): T4, T3U, TSH, TSHEXT in the last 72 hours. No lab exists for component: T3RU Pathology pathology No Known Allergies Current Facility-Administered Medications Medication Dose Route Frequency  rifAXIMin (XIFAXAN) tablet 550 mg  550 mg Oral BID  lactulose (CHRONULAC) solution 10 g  15 mL Oral TID  phytonadione (VITAMIN K) 1 mg/mL oral solution 5 mg  5 mg Oral DAILY  LORazepam (ATIVAN) injection 0.5 mg  0.5 mg IntraVENous Q12H PRN  
 0.9% sodium chloride infusion  50 mL/hr IntraVENous DIALYSIS PRN  
 cefTRIAXone (ROCEPHIN) 1 g in 0.9% sodium chloride (MBP/ADV) 50 mL MBP  1 g IntraVENous Q24H  carvedilol (COREG) tablet 25 mg  25 mg Oral BID WITH MEALS  
 B complex-vitamin C-folic acid (NEPHRO-NACHO) 0.8 mg tab  1 Tab Oral DAILY  sevelamer carbonate (RENVELA) tab 800 mg  800 mg Oral TID WITH MEALS  zolpidem (AMBIEN) tablet 5 mg  5 mg Oral QHS PRN  
 arformoterol (BROVANA) neb solution 15 mcg  15 mcg Nebulization BID RT And  
 budesonide (PULMICORT) 500 mcg/2 ml nebulizer suspension  500 mcg Nebulization BID RT  
 
 
ASSESSMENT: 
Cholestatic jaundice. Ascites with cell count profile consistent with bacterial peritonitis, SAAG<1.1. Cholelithiasis. MRCP without contrast to rule out biliary obstruction. Transjugular liver biopsy with hepatic vein pressure measurements. Check hepatitis C RNA and hepatitis A IGM Rest of liver work-up pending. Agree with Vicky.   
 
Renato Hutchinson MD

## 2019-02-11 NOTE — PROGRESS NOTES
Bedside shift change report given to RUPINDER Villareal (oncoming nurse) by Sivakumar Ellis RN (offgoing nurse). Report included the following information SBAR, Kardex, Intake/Output, MAR and Recent Results. 2030  In bed resting, assessment completed. 2145  Hydralazine held - BP 92/49. Refused Lactulose states that it makes her drowsy. Dr Janeth Wade notified. States that pt needs to 
 
0030  In bed awake. Trying to get OOB. 0140  Pt was found sitting up on the floor. Fall was unwitnessed. Post fall assessment completed, VS taken. Notified Dr Janeth Wade. New order received for STAT CT of the head. Supervisor notified. Post fall assessment completed. 0500  In bed resting, still trying to get OOB. 0600  Sleeping.  
 
Bedside Shift report given to RUPINDER De La Cruz

## 2019-02-11 NOTE — PROCEDURES
POST PARACENTESIS PROCEDURE NOTE:    Procedure: US Guided Paracentesis    Pre-operative Diagnosis: ascites    Post-operative Diagnosis: same    Indication: Ascites    Procedure Details: Standard aseptic technique. Ultrasound guidance. right approach. 1% lidocaine for local anesthesia. 5 [place] Yueh sheathed needle connected to vacuum bottle. 100 cc of darm jed ascites fluid removed. Specimen: dark jed color, to lab. Complications:  None. EBL: Minimal.               Condition: Stable. Impression:  Successful paracentesis.       Maxim Patel MD  Vascular & Interventional Radiology    [address] [place] [address],-[facility]     2/11/2019

## 2019-02-11 NOTE — PROGRESS NOTES
Renal Progress Note Follow up of ESRD Assessment/Plan: 1. ESRD. Dialysis tomorrow and back to [facility] schedule. 2. HTN. BP is rather low, d/c hydralazine and imdur. 3. Anemia of ckd. LORENA will be resumed at op unit. 4. Secondary hyperparathyroidism of ckd. D/c calcitriol (is getting hectorol at op unit). Hold sensipar due to hypocalcemia. 5. Non tense ascites. Will try to pull more volume with dialysis but may need paracentesis. 6. Systolic chf.  
7. UTI. On abx.  
8. Altered ms. Improving. Etio? UTI? Subjective: 
Patient complaints off: sleepy but arousable, ox3. No SOB, chest pain, nausea or vomiting. Patient Active Problem List  
Diagnosis Code  Metabolic encephalopathy N96.12  
 UTI (urinary tract infection) N39.0  Cirrhosis ([facility] [address] K74.60  ESRD (end stage renal disease) on dialysis ([facility]) N18.6, Z99.2  COPD (chronic obstructive pulmonary disease) ([facility]) J44.9  Hyperbilirubinemia E80.6  Combined systolic and diastolic heart failure ([facility]) I50.40 Current Facility-Administered Medications Medication Dose Route Frequency Provider Last Rate Last Dose  rifAXIMin (XIFAXAN) tablet 550 mg  550 mg Oral BID So Osuna MD   550 mg at 02/11/19 1031  
 lactulose (CHRONULAC) solution 10 g  15 mL Oral TID So Osuna MD   10 g at 02/11/19 1031  phytonadione (VITAMIN K) 1 mg/mL oral solution 5 mg  5 mg Oral DAILY Lisa QUEZADA MD   5 mg at 02/11/19 1031  LORazepam (ATIVAN) injection 0.5 mg  0.5 mg IntraVENous Q12H PRN Ramy Mcneill PA   0.5 mg at 02/09/19 1131  
 0.9% sodium chloride infusion  50 mL/hr IntraVENous DIALYSIS PRN Jose Alfredo Bell MD      
 cefTRIAXone (ROCEPHIN) 1 g in 0.9% sodium chloride (MBP/ADV) 50 mL MBP  1 g IntraVENous Q24H Lisa BRANCH  mL/hr at 02/10/19 1800 1 g at 02/10/19 1800  
 calcitRIOL (ROCALTROL) capsule 0.5 mcg  0.5 mcg Oral DAILY Irma Thakkar MD   0.5 mcg at 02/11/19 4829  carvedilol (COREG) tablet 25 mg  25 mg Oral BID WITH MEALS Enrique Grider MD   25 mg at 02/11/19 1031  
 cinacalcet (SENSIPAR) tablet 30 mg  30 mg Oral DAILY Enrique Grider MD   30 mg at 02/11/19 1031  B complex-vitamin C-folic acid (NEPHRO-NACHO) 0.8 mg tab  1 Tab Oral DAILY Enrique Grider MD   1 Tab at 02/11/19 1031  sevelamer carbonate (RENVELA) tab 800 mg  800 mg Oral TID WITH MEALS Enrique Grider MD   800 mg at 02/11/19 1031  
 zolpidem (AMBIEN) tablet 5 mg  5 mg Oral QHS PRN Enrique Grider MD      
 arformoterol (BROVANA) neb solution 15 mcg  15 mcg Nebulization BID RT Enrique Grider MD   15 mcg at 02/10/19 2126 And  budesonide (PULMICORT) 500 mcg/2 ml nebulizer suspension  500 mcg Nebulization BID RT Enrique Grider MD   500 mcg at 02/10/19 2126 Objective: 
Vitals:  
 02/11/19 0600 02/11/19 0839 02/11/19 1038 02/11/19 1156 BP:  94/53 106/78 94/55 Pulse:  60  60 Resp:  16  18 Temp:  97.8 °F (36.6 °C)  98 °F (36.7 °C) TempSrc:      
SpO2:    94% Weight: 55 kg (121 lb 4.8 oz) Height: [place] Anita No intake or output data in the 24 hours ending 02/11/19 1223 Admission weight:Weight: 61.2 kg (135 lb) (02/08/19 0706) Last Weight Metrics: 
Weight Loss Metrics 2/11/2019 10/30/2018 Today's Wt 121 lb 4.8 oz 139 lb BMI 20.19 kg/m2 23.13 kg/m2 Physical Exam:  
 
General: No acute distress. Neck: no jvd. LUNGS: good air entry, bl exp rhonchi. CVS EXM: S1, S2, no murmur, rub or gallop. Abdomen: Soft, Non Tender, slightly distended, non tense ascites. Lower Extremities: No Edema. Access: lt arm avf, good thrill, dressings intact. Labs: CBC w/Diff Recent Labs  
  02/11/19 
0540 02/10/19 
0431 02/09/19 
3057 WBC 9.5 10.6 12.6 RBC 3.47* 3.88* 3.74* HGB 8.9* 10.1* 9.6* HCT 26.7* 30.7* 28.5*  
* 105* 113* GRANS 83* 85* 82* LYMPH 11* 10* 11* EOS 0 1 1 Chemistry Recent Labs  
  02/11/19 
0540 02/10/19 4383 02/09/19 
9009 GLU 63* 56* 61*  137 135* K 3.9 4.1 5.2 CL 96* 95* 91* CO2 28 27 27 BUN 29* 21* 43* CREA 5.22* 4.35* 7.68* CA 7.9* 8.7 9.2  9.4 AGAP 13 15 17 BUCR 6* 5* 6* ALB 1.3* 1.4*  --   
PHOS 4.9 4.5 7.3* Lab Results Component Value Date/Time Iron 67 02/09/2019 05:48 AM  
 TIBC 143 (L) 02/09/2019 05:48 AM  
 Iron % saturation 47 02/09/2019 05:48 AM  
  
Lab Results Component Value Date/Time Calcium 7.9 (L) 02/11/2019 05:40 AM  
 CALCIUM,IONIZED 4.50 10/30/2018 11:15 AM  
 Phosphorus 4.9 02/11/2019 05:40 AM  
  
 
 
Nuria Doe M.D [facility] Office [phone] Pager [phone]

## 2019-02-11 NOTE — PROGRESS NOTES
Problem: Mobility Impaired (Adult and Pediatric) Goal: *Acute Goals and Plan of Care (Insert Text) Physical Therapy Goals Initiated 2/11/2019 and to be accomplished within 3 day trial.  
If appropriate continue per plan of care to accomplish goals within 7 days. 1.  Patient will maintain eyes open for greater than 90% of session to allow for active participation in mobility training. 2.  Patient will follow 90% of commands to maximize safety and active participation in mobility training. 3.  Patient will move from supine to sit and sit to supine in bed with supervision/set-up. 4.  Patient will maintain seated at edge of bed for 8 min with supervision/set-up to prepare for out of bed activity. 5.  Patient will perform sit to stand with minimal assistance/contact guard assist. 
6.  Patient will transfer from bed to chair and chair to bed with minimal assistance/contact guard assist using the least restrictive device. 7.  Patient will ambulate with minimal assistance/contact guard assist for 10 feet with the least restrictive device. Outcome: Progressing Towards Goal 
PHYSICAL THERAPY: Initial Assessment INPATIENT: Medicare: Hospital Day: 4 Patient: Derrell Herrera (45 y.o. female)    Date: 2/11/2019 Primary Diagnosis: UTI (urinary tract infection) [N39.0] Metabolic encephalopathy [S65.27] Precautions: Fall PLOF: Unknown ASSESSMENT : 
Patient requires between maximal assistance and moderate assistance for bed mobility, transfers and ambulation. Lethargic. Eyes open to voice. Eyes open less than 25% of session. Oriented to name only. Unable to answer home set-up/prior level of function questions. Decreased command following; follows less than 25% of commands. Max A for supine to sit. Supervision for sitting balance. Total A for changing soiled hospital gown. Unable to follow commands for BLE AROM/manual muscle testing. Deferred sit to stand d/t decreased command following. Returned to supine with max A. Completed rolling with mod A. Seated in bed with HOB elevated and all needs in reach. RN Dorothy aware. Will follow for 3 day trial to better determine active participation in skilled treatment and prior level of function. If appropriate, continue per plan of care 3-5x/week to address goals. Patient presents with deficits in: 
Bed Mobility, Transfers, Gait, Strength, Range of Motion, Balance and Stairs Patient will benefit from skilled intervention to address the above impairments. Patients rehabilitation potential is considered to be Good Factors which may influence rehabilitation potential include:  
[]         None noted 
[x]         Mental ability/status [x]         Medical condition 
[]         Home/family situation and support systems 
[]         Safety awareness 
[]         Pain tolerance/management 
[]         Other: PLAN : 
Recommendations and Planned Interventions: 
[x]           Bed Mobility Training             [x]    Neuromuscular Re-Education 
[x]           Transfer Training                   []    Orthotic/Prosthetic Training 
[x]           Gait Training                          []    Modalities [x]           Therapeutic Exercises          []    Edema Management/Control 
[x]           Therapeutic Activities            [x]    Patient and Family Training/Education 
[]           Other (comment): EDUCATION:  
Education:  Patient was educated on the following topics: Bed mobility, transfers, ADLs, balance, amb, safety, exercise, role of PT, plan of care, cognition, skin integrity, vitals Barriers to Learning/Limitations: yes;  cognitive, sensory deficits-vision/hearing/speech and altered mental status (i.e.Sedation, Confusion) Compensate with: visual, verbal, tactile, kinesthetic cues/model Frequency/Duration: Patient will be followed for 3 day trial to better determine active participation in skilled treatment and prior level of function. If appropriate, continue per plan of care 3-5x/week to address goals. Discharge Recommendations: Santhosh Flores Further Equipment Recommendations for Discharge: TBD with OOB mobility SUBJECTIVE:  
Patient stated Minh Manzano.  OBJECTIVE DATA SUMMARY:  
 
Past Medical History:  
Diagnosis Date  CAD (coronary artery disease) RCA TAMMY (2015)  Cardiomyopathy ([facility] [address]  CHF (congestive heart failure) ([facility] [address]  Chronic obstructive pulmonary disease ([facility] [address]  Diabetes ([facility] [address]  ESRD on dialysis ([facility] [address]  HLD (hyperlipidemia)  Hypertension  Liver disease  S/P heart valve repair Past Surgical History:  
Procedure Laterality Date  CARDIAC SURG PROCEDURE UNLIST Eval Complexity: History: MEDIUM  Complexity : 1-2 comorbidities / personal factors will impact the outcome/ POC Exam:MEDIUM Complexity : 3 Standardized tests and measures addressing body structure, function, activity limitation and / or participation in recreation  Presentation: MEDIUM Complexity : Evolving with changing characteristics  Clinical Decision Making:Medium Complexity clinical judgement; ROM, MMT, functional mobility Overall Complexity:MEDIUM Prior Level of Function/Home Situation:  
Home Situation Home Environment: Apartment # Steps to Enter: 1 One/Two Story Residence: One story Living Alone: No 
Support Systems: Family member(s) Patient Expects to be [facility] Current DME Used/Available at Home: Walker, Glucometer, NebulizerCritical Behavior: 
Neurologic State: Lethargic Orientation Level: Oriented to person Cognition: Decreased command following Psychosocial 
Patient Behaviors: Lethargic Manual Muscle Testing (LE) Unable to assess d/t decreased command following Tone : BLE normalSensation: Unable to accurately assess Range Of Motion: PROM WFL; AROM decreased per observation of spontaneous movement; no AROM on command Functional Mobility: 
 
 
Functional Status Indep (I) Mod I Super-vision Min A Mod A Max A Total A Assist x2 Verbal cues Additional time Not tested Comments Rolling []  []  [] []    [x]    []  []  [] [] [] [] Supine to sit []  []  [] []  []  [x]  []  [] [] [] [] Sit to supine []  []  [] []  []  [x]  []  [] [] [] [] Sit to stand []  []  [] []  []  []  []  [] [] [] [x] Deferred d/t decreased command following Stand to sit []  []  [] []  []  []  []  [] [] [] [x] Bed to chair transfers []  []  [] []  []  []  []  [] [] [] [x] Balance Good Zeenat Minna Poor Unable Not tested Comments Sitting static []  [x]  []  []  [] Sitting dynamic []  [x]  []  []  []   
Standing static []  []  []  []  [x] Standing dynamic []  []  []  []  [x] Mobility/Gait:  
Deferred d/t decreased command following Therapeutic Exercises:  
Seated EOB 3 minutes Pain: 
Pre treatment pain level: 0 Post treatment pain level: 0 Pain Scale 1: Numeric (0 - 10) Pain Intensity 1: 0 Activity Tolerance:  
Poor Please refer to the flowsheet for vital signs taken during this treatment. After treatment:  
[]         Patient left in no apparent distress sitting up in chair 
[x]         Patient left in no apparent distress in bed 
[x]         Call bell left within reach [x]         Nursing notified 
[]         Caregiver present 
[]         Bed alarm activated COMMUNICATION/EDUCATION:  
[x]         Fall prevention education was provided and the patient/caregiver indicated understanding. [x]         Patient/family have participated as able in goal setting and plan of care. []         Patient/family agree to work toward stated goals and plan of care. []         Patient understands intent and goals of therapy, but is neutral about his/her participation. []         Patient is unable to participate in goal setting and plan of care. Thank you for this referral. 
Lee Ann Quesada, PT Time Calculation: 15 mins

## 2019-02-11 NOTE — CONSULTS
[facility] - Consult Note    Consultation request by Dr. Gladis Aase for advice/opinion related to evaluating CHF    Date of  Admission: 2/8/2019  5:53 AM   Primary Care Physician:  Aramis Guillen MD      I saw, evaluated, interviewed and examined the patient personally. I agree with the findings and plan of care as documented below with PA-C note  Patient was admitted because of altered mental status. There is concern that patient may have a liver disorder causing this. Cardiology was asked to help manage congestive heart failure which may be causing elevated liver enzymes. In my opinion, such a high level of total bilirubin and alkaline phosphatase and elevated INR is less likely from congestive hepatopathy. Her AST and ALT is normal.  In my opinion, and congestive heart failure causing passive congestion causes less of elevation of total bilirubin. Usually alkaline phosphatase is normal.  Transaminases elevation are usually 2-3 fold from normal level which is normal in this patient. This does not rule out congestive heart failure. This enzyme elevation could be from biliary obstructive process. I will defer this to GI team.  Limited echo cardia Chikismichael Danielle has been ordered. Her volume management is controlled by dialysis. She has ascites on exam however no lower extremity edema. She does not have a significant rales on exam.  This could also be from right-sided failure. We will continue to follow    Kumar Ann MD         Assessment:     -Presented from dialysis 2/8/19 due to altered mental status over the prior 5 days but worsened day of presentation.   Being treated for UTI - UA with TNTC WBC, 4_ bacteria, large LE, + nitrites; UCx NGTD  -ESRD, on HD  -Hx CAD, s/p RCA TAMMY 2015  -Nuclear stress test 10/16/2018: inferolateral wall infarct without definite evidence of ischemia, Ef 19%  -AMINAH 10/30/2018: Mildly enlarged LV with severely reduced systolic function, estimated EF 20%, enlarged RV with reduced systolic function, mild mitral and tricuspid regurgitation, trivial aortic regurgitation  -Cardiomyopathy, Echo 11/29/2018 ([facility]) with EF 38% (no significant change from 12/11/2016) with hypokinetic anteroseptal, septal and inferoseptal walls, upper normal LV cavity size, mildly dilated RV, normal RV systolic function, PASP estimated at 54 mmHg c/w moderate pulmonary hypertension  -HTN  -DM  -HLD  -Anemia  -Thrombocytopenia  -Hypoalbuminemia  -COPD    Primary cardiologist is Dr. Jelani Ferraro ([facility])     Plan:     -Defer volume management to nephrology team.  -Continue Coreg.  -Will obtain limited echo for re-evaluation, EF 38% at [facility] 11/29/2018 but ~20% on NST and AMINAH in 10/2018.    -Defer further evaluation of markedly elevated bilirubin and alk. Phosphatase to GI team, transaminases are normal.     History of Present Illness: This is a 46 y.o. female admitted for UTI (urinary tract infection) [O08.0]  Metabolic encephalopathy [W96.92]. Patient complains of: Altered mental status    Jamie Underwood is a 46 y.o. female with PMHx as noted above, who presented to the hospital from dialysis due to altered mental status, which per eileen's report earlier during admission was ongoing x 5 days but worse the day of presentation (3 days ago). Pt currently appears somnolent and is not answering questions, thus unable to obtain additional history.         Cardiac risk factors: dyslipidemia, hypertension, Hx cardiomyopathy, CAD      Review of Symptoms:  Unable to obtain due to mental status       Past Medical History:     Past Medical History:   Diagnosis Date    CAD (coronary artery disease)     RCA TAMMY (2015)    Cardiomyopathy ([address]     CHF (congestive heart failure) (HCC)     Chronic obstructive pulmonary disease ([address]     Diabetes ([address]     ESRD on dialysis ([address]     HLD (hyperlipidemia)     Hypertension     Liver disease     S/P heart valve repair          Social History:     Social History Socioeconomic History    Marital status: SINGLE     Spouse name: Not on file    Number of children: Not on file    Years of education: Not on file    Highest education level: Not on file   Tobacco Use    Smoking status: Unknown If Ever Smoked   Substance and Sexual Activity    Alcohol use: No     Frequency: Never    Drug use: No   Other Topics Concern        Family History:   History reviewed. No pertinent family history.      Medications:   No Known Allergies     Current Facility-Administered Medications   Medication Dose Route Frequency    rifAXIMin (XIFAXAN) tablet 550 mg  550 mg Oral BID    lactulose (CHRONULAC) solution 10 g  15 mL Oral TID    phytonadione (VITAMIN K) 1 mg/mL oral solution 5 mg  5 mg Oral DAILY    LORazepam (ATIVAN) injection 0.5 mg  0.5 mg IntraVENous Q12H PRN    0.9% sodium chloride infusion  50 mL/hr IntraVENous DIALYSIS PRN    cefTRIAXone (ROCEPHIN) 1 g in 0.9% sodium chloride (MBP/ADV) 50 mL MBP  1 g IntraVENous Q24H    calcitRIOL (ROCALTROL) capsule 0.5 mcg  0.5 mcg Oral DAILY    carvedilol (COREG) tablet 25 mg  25 mg Oral BID WITH MEALS    cinacalcet (SENSIPAR) tablet 30 mg  30 mg Oral DAILY    B complex-vitamin C-folic acid (NEPHRO-NACHO) 0.8 mg tab  1 Tab Oral DAILY    sevelamer carbonate (RENVELA) tab 800 mg  800 mg Oral TID WITH MEALS    zolpidem (AMBIEN) tablet 5 mg  5 mg Oral QHS PRN    arformoterol (BROVANA) neb solution 15 mcg  15 mcg Nebulization BID RT    And    budesonide (PULMICORT) 500 mcg/2 ml nebulizer suspension  500 mcg Nebulization BID RT         Physical Exam:     Visit Vitals  /78   Pulse 60   Temp 97.8 °F (36.6 °C)   Resp 16   Ht 5' 5\" (1.651 m)   Wt 121 lb 4.8 oz (55 kg)   SpO2 98%   BMI 20.19 kg/m²     BP Readings from Last 3 Encounters:   02/11/19 106/78   10/30/18 134/70     Pulse Readings from Last 3 Encounters:   02/11/19 60   10/30/18 100     Wt Readings from Last 3 Encounters:   02/11/19 121 lb 4.8 oz (55 kg)   10/30/18 139 lb (63 kg)       General:  Somnolent, no apparent distress, appears much older than stated age  Neck:  No JVD  Lungs:  clear to auscultation bilaterally to anterolateral lung fields  Heart:  Regular rate and rhythm  Abdomen:  abdomen is soft with mild tenderness  Extremities:  no edema  Skin: Warm and dry.    Neuro: somnolent, not answering questions, no involuntary movements  Psych: unable to adequately assess due to condition     Data Review:     Recent Labs     02/11/19  0540 02/10/19  0431 02/09/19  0548   WBC 9.5 10.6 12.6   HGB 8.9* 10.1* 9.6*   HCT 26.7* 30.7* 28.5*   * 105* 113*     Recent Labs     02/11/19  0540 02/10/19  1305 02/10/19  0431 02/09/19  0548     --  137 135*   K 3.9  --  4.1 5.2   CL 96*  --  95* 91*   CO2 28  --  27 27   GLU 63*  --  56* 61*   BUN 29*  --  21* 43*   CREA 5.22*  --  4.35* 7.68*   CA 7.9*  --  8.7 9.2  9.4   PHOS 4.9  --  4.5 7.3*   ALB 1.3*  --  1.4*  --    INR 1.7* 2.2*  --   --        Results for orders placed or performed during the hospital encounter of 02/08/19   EKG, 12 LEAD, INITIAL   Result Value Ref Range    Ventricular Rate 99 BPM    Atrial Rate 99 BPM    P-R Interval 150 ms    QRS Duration 118 ms    Q-T Interval 404 ms    QTC Calculation (Bezet) 518 ms    Calculated P Axis 88 degrees    Calculated R Axis 46 degrees    Calculated T Axis 121 degrees    Diagnosis       Poor data quality, interpretation may be adversely affected  Electrode noise  Repeat EKG  Normal sinus rhythm  Poor R Wave Progression  Non-specific ST/T wave changes  Prolonged QT  Abnormal ECG  No previous ECGs available  Confirmed by [facility] ([id number]) on 2/8/2019 10:54:45 AM           Signed By: Teresa Dutta PA-C     February 11, 2019

## 2019-02-11 NOTE — PROGRESS NOTES
Problem: Pressure Injury - Risk of 
Goal: *Prevention of pressure injury Document Honorio Scale and appropriate interventions in the flowsheet. Outcome: Progressing Towards Goal 
Pressure Injury Interventions: 
Sensory Interventions: Keep linens dry and wrinkle-free, Maintain/enhance activity level, Minimize linen layers Moisture Interventions: Absorbent underpads, Limit adult briefs Activity Interventions: Pressure redistribution bed/mattress(bed type) Mobility Interventions: HOB 30 degrees or less, Pressure redistribution bed/mattress (bed type) Nutrition Interventions: Document food/fluid/supplement intake Friction and Shear Interventions: Minimize layers, HOB 30 degrees or less Problem: Falls - Risk of 
Goal: *Absence of Falls Document Candy Denis Fall Risk and appropriate interventions in the flowsheet. Outcome: Progressing Towards Goal 
Fall Risk Interventions: 
Mobility Interventions: Patient to call before getting OOB, PT Consult for mobility concerns Mentation Interventions: Door open when patient unattended, Reorient patient, Room close to nurse's station Medication Interventions: Bed/chair exit alarm, Teach patient to arise slowly, Patient to call before getting OOB Elimination Interventions: Call light in reach, Patient to call for help with toileting needs, Toilet paper/wipes in reach History of Falls Interventions: Bed/chair exit alarm, Door open when patient unattended, Evaluate medications/consider consulting pharmacy

## 2019-02-12 ENCOUNTER — APPOINTMENT (OUTPATIENT)
Dept: MRI IMAGING | Age: 49
DRG: 871 | End: 2019-02-12
Attending: HOSPITALIST
Payer: MEDICARE

## 2019-02-12 PROBLEM — R17 CHOLESTATIC JAUNDICE: Status: ACTIVE | Noted: 2019-02-12

## 2019-02-12 PROBLEM — K65.2 SBP (SPONTANEOUS BACTERIAL PERITONITIS) (HCC): Status: ACTIVE | Noted: 2019-02-12

## 2019-02-12 LAB
A1AT SERPL-MCNC: 217 MG/DL (ref 90–200)
ACTIN IGG SERPL-ACNC: 18 UNITS (ref 0–19)
AFP-TM SERPL-MCNC: 1.1 NG/ML (ref 0–8.3)
ALBUMIN SERPL-MCNC: 1.3 G/DL (ref 3.4–5)
ALBUMIN/GLOB SERPL: 0.3 {RATIO} (ref 0.8–1.7)
ALP SERPL-CCNC: 746 U/L (ref 45–117)
ALT SERPL-CCNC: 31 U/L (ref 13–56)
ANA SER QL: NEGATIVE
ANION GAP SERPL CALC-SCNC: 13 MMOL/L (ref 3–18)
AST SERPL-CCNC: 43 U/L (ref 15–37)
BASOPHILS # BLD: 0 K/UL (ref 0–0.1)
BASOPHILS NFR BLD: 0 % (ref 0–2)
BILIRUB SERPL-MCNC: 17.4 MG/DL (ref 0.2–1)
BUN SERPL-MCNC: 34 MG/DL (ref 7–18)
BUN/CREAT SERPL: 6 (ref 12–20)
CALCIUM SERPL-MCNC: 7.5 MG/DL (ref 8.5–10.1)
CERULOPLASMIN SERPL-MCNC: 41.3 MG/DL (ref 19–39)
CHLORIDE SERPL-SCNC: 95 MMOL/L (ref 100–108)
CO2 SERPL-SCNC: 28 MMOL/L (ref 21–32)
CREAT SERPL-MCNC: 5.86 MG/DL (ref 0.6–1.3)
DIFFERENTIAL METHOD BLD: ABNORMAL
EOSINOPHIL # BLD: 0 K/UL (ref 0–0.4)
EOSINOPHIL NFR BLD: 0 % (ref 0–5)
ERYTHROCYTE [DISTWIDTH] IN BLOOD BY AUTOMATED COUNT: 24 % (ref 11.6–14.5)
GLOBULIN SER CALC-MCNC: 4 G/DL (ref 2–4)
GLUCOSE SERPL-MCNC: 84 MG/DL (ref 74–99)
HAV IGM SER QL: NEGATIVE
HCT VFR BLD AUTO: 24.4 % (ref 35–45)
HGB BLD-MCNC: 8.2 G/DL (ref 12–16)
IGG SERPL-MCNC: 1680 MG/DL (ref 700–1600)
INR PPP: 1.5 (ref 0.8–1.2)
LYMPHOCYTES # BLD: 1 K/UL (ref 0.9–3.6)
LYMPHOCYTES NFR BLD: 9 % (ref 21–52)
MCH RBC QN AUTO: 26.2 PG (ref 24–34)
MCHC RBC AUTO-ENTMCNC: 33.6 G/DL (ref 31–37)
MCV RBC AUTO: 78 FL (ref 74–97)
MITOCHONDRIA M2 IGG SER-ACNC: <20 UNITS (ref 0–20)
MONOCYTES # BLD: 0.5 K/UL (ref 0.05–1.2)
MONOCYTES NFR BLD: 4 % (ref 3–10)
NEUTS SEG # BLD: 10.1 K/UL (ref 1.8–8)
NEUTS SEG NFR BLD: 87 % (ref 40–73)
PLATELET # BLD AUTO: 74 K/UL (ref 135–420)
POTASSIUM SERPL-SCNC: 4 MMOL/L (ref 3.5–5.5)
PROT SERPL-MCNC: 5.3 G/DL (ref 6.4–8.2)
PROTHROMBIN TIME: 18.3 SEC (ref 11.5–15.2)
RBC # BLD AUTO: 3.13 M/UL (ref 4.2–5.3)
SODIUM SERPL-SCNC: 136 MMOL/L (ref 136–145)
WBC # BLD AUTO: 11.6 K/UL (ref 4.6–13.2)

## 2019-02-12 PROCEDURE — 85025 COMPLETE CBC W/AUTO DIFF WBC: CPT

## 2019-02-12 PROCEDURE — 80053 COMPREHEN METABOLIC PANEL: CPT

## 2019-02-12 PROCEDURE — 74011250637 HC RX REV CODE- 250/637: Performed by: HOSPITALIST

## 2019-02-12 PROCEDURE — 86709 HEPATITIS A IGM ANTIBODY: CPT

## 2019-02-12 PROCEDURE — 74011250637 HC RX REV CODE- 250/637: Performed by: INTERNAL MEDICINE

## 2019-02-12 PROCEDURE — 65270000029 HC RM PRIVATE

## 2019-02-12 PROCEDURE — P9047 ALBUMIN (HUMAN), 25%, 50ML: HCPCS | Performed by: INTERNAL MEDICINE

## 2019-02-12 PROCEDURE — 85610 PROTHROMBIN TIME: CPT

## 2019-02-12 PROCEDURE — 74011250636 HC RX REV CODE- 250/636: Performed by: HOSPITALIST

## 2019-02-12 PROCEDURE — 36415 COLL VENOUS BLD VENIPUNCTURE: CPT

## 2019-02-12 PROCEDURE — 74011250636 HC RX REV CODE- 250/636: Performed by: FAMILY MEDICINE

## 2019-02-12 PROCEDURE — 74011250636 HC RX REV CODE- 250/636: Performed by: INTERNAL MEDICINE

## 2019-02-12 PROCEDURE — 74011000250 HC RX REV CODE- 250: Performed by: HOSPITALIST

## 2019-02-12 PROCEDURE — 87522 HEPATITIS C REVRS TRNSCRPJ: CPT

## 2019-02-12 PROCEDURE — 94640 AIRWAY INHALATION TREATMENT: CPT

## 2019-02-12 PROCEDURE — 74011000258 HC RX REV CODE- 258: Performed by: HOSPITALIST

## 2019-02-12 RX ORDER — LORAZEPAM 0.5 MG/1
0.5 TABLET ORAL
Status: DISCONTINUED | OUTPATIENT
Start: 2019-02-12 | End: 2019-02-14

## 2019-02-12 RX ORDER — MIDODRINE HYDROCHLORIDE 2.5 MG/1
5 TABLET ORAL EVERY 8 HOURS
Status: DISCONTINUED | OUTPATIENT
Start: 2019-02-12 | End: 2019-02-17

## 2019-02-12 RX ORDER — LORAZEPAM 2 MG/ML
1 INJECTION INTRAMUSCULAR ONCE
Status: COMPLETED | OUTPATIENT
Start: 2019-02-12 | End: 2019-02-12

## 2019-02-12 RX ORDER — ALBUMIN HUMAN 250 G/1000ML
75 SOLUTION INTRAVENOUS ONCE
Status: COMPLETED | OUTPATIENT
Start: 2019-02-12 | End: 2019-02-12

## 2019-02-12 RX ORDER — SODIUM CHLORIDE 9 MG/ML
500 INJECTION, SOLUTION INTRAVENOUS ONCE
Status: COMPLETED | OUTPATIENT
Start: 2019-02-12 | End: 2019-02-12

## 2019-02-12 RX ADMIN — LORAZEPAM 0.5 MG: 0.5 TABLET ORAL at 12:25

## 2019-02-12 RX ADMIN — BUDESONIDE 500 MCG: 0.5 INHALANT RESPIRATORY (INHALATION) at 08:46

## 2019-02-12 RX ADMIN — LACTULOSE 10 G: 20 SOLUTION ORAL at 17:10

## 2019-02-12 RX ADMIN — BUDESONIDE 500 MCG: 0.5 INHALANT RESPIRATORY (INHALATION) at 21:05

## 2019-02-12 RX ADMIN — ALBUMIN (HUMAN) 75 G: 0.25 INJECTION, SOLUTION INTRAVENOUS at 22:06

## 2019-02-12 RX ADMIN — ARFORMOTEROL TARTRATE 15 MCG: 15 SOLUTION RESPIRATORY (INHALATION) at 21:04

## 2019-02-12 RX ADMIN — SODIUM CHLORIDE, SODIUM LACTATE, POTASSIUM CHLORIDE, AND CALCIUM CHLORIDE 250 ML: 600; 310; 30; 20 INJECTION, SOLUTION INTRAVENOUS at 01:41

## 2019-02-12 RX ADMIN — RIFAXIMIN 550 MG: 550 TABLET ORAL at 17:10

## 2019-02-12 RX ADMIN — LORAZEPAM 1 MG: 2 INJECTION, SOLUTION INTRAMUSCULAR; INTRAVENOUS at 13:33

## 2019-02-12 RX ADMIN — CEFTRIAXONE 1 G: 1 INJECTION, POWDER, FOR SOLUTION INTRAMUSCULAR; INTRAVENOUS at 17:10

## 2019-02-12 RX ADMIN — SODIUM CHLORIDE 500 ML: 900 INJECTION, SOLUTION INTRAVENOUS at 09:13

## 2019-02-12 RX ADMIN — ARFORMOTEROL TARTRATE 15 MCG: 15 SOLUTION RESPIRATORY (INHALATION) at 08:46

## 2019-02-12 RX ADMIN — MIDODRINE HYDROCHLORIDE 5 MG: 2.5 TABLET ORAL at 14:44

## 2019-02-12 NOTE — PROGRESS NOTES
Renal Progress Note Follow up of ESRD Assessment/Plan: 1. ESRD. I am reluctant to try dialysis today due to hypotension. Volume status/electrolytes are stable. Will plan to dialysis tomorrow and continue mwf. Minimize intake (limit fluid boluses), especially given known systolic chf.  
2. Hypotension due to sbp/liver failure/sepsis. D/c carvediol. Add midodrin. Will give iv albumin with dialysis tomorrow. 3. Anemia of ckd. Resume greg with dialysis. 4. Secondary hyperparathyroidism of ckd. Continue to old sensipar due to hypocalcemia. 5. Liver cirrhosis/ascites/sbp. On abx. GI on the case. 6. Systolic chf.  
7. UTI. On abx.  
8. Altered ms. Improving. Etio? sbp/UTI? Subjective: 
Patient complaints off: sleepy but arousable, ox3. No SOB, chest pain, nausea or vomiting. Appetite is poor. Patient Active Problem List  
Diagnosis Code  Metabolic encephalopathy V80.18  
 UTI (urinary tract infection) N39.0  Cirrhosis ([facility] [address] K74.60  ESRD (end stage renal disease) on dialysis ([facility]) N18.6, Z99.2  COPD (chronic obstructive pulmonary disease) ([facility]) J44.9  Hyperbilirubinemia E80.6  Combined systolic and diastolic heart failure ([facility]) I50.40 Current Facility-Administered Medications Medication Dose Route Frequency Provider Last Rate Last Dose  LORazepam (ATIVAN) tablet 0.5 mg  0.5 mg Oral Q12H PRN Alia Purdy MD      
 rifAXIMin Eleonore Handing) tablet 550 mg  550 mg Oral BID Escobar Loza MD   Stopped at 02/12/19 0900  
 lactulose (CHRONULAC) solution 10 g  15 mL Oral TID Escobar Loza MD   10 g at 02/11/19 1031  phytonadione (VITAMIN K) 1 mg/mL oral solution 5 mg  5 mg Oral DAILY Brett QUEZADA MD   5 mg at 02/11/19 1031  
 0.9% sodium chloride infusion  50 mL/hr IntraVENous DIALYSIS PRN Lindsay Coto MD      
 cefTRIAXone (ROCEPHIN) 1 g in 0.9% sodium chloride (MBP/ADV) 50 mL MBP 1 g IntraVENous Q24H Marla Woodruff  mL/hr at 02/11/19 1723 1 g at 02/11/19 1723  carvedilol (COREG) tablet 25 mg  25 mg Oral BID WITH MEALS Marla Woodruff MD   Stopped at 02/12/19 0800  B complex-vitamin C-folic acid (NEPHRO-NACHO) 0.8 mg tab  1 Tab Oral DAILY Marla Woodruff MD   Stopped at 02/12/19 0900  sevelamer carbonate (RENVELA) tab 800 mg  800 mg Oral TID WITH MEALS Marla Woodruff MD   Stopped at 02/12/19 0800  
 arformoterol (BROVANA) neb solution 15 mcg  15 mcg Nebulization BID RT Marla Woodruff MD   15 mcg at 02/12/19 1568 And  budesonide (PULMICORT) 500 mcg/2 ml nebulizer suspension  500 mcg Nebulization BID RT Marla Woodruff MD   500 mcg at 02/12/19 5634 Objective: 
Vitals:  
 02/12/19 0320 02/12/19 0410 02/12/19 4168 02/12/19 0584 BP: (!) 172/145 95/45 (!) [id number] Pulse:  60 (!) 58 Resp:  16 16 Temp:  97 °F (36.1 °C) (!) 94.2 °F (34.6 °C) TempSrc:      
SpO2:  95% 94% 98% Weight:      
Height:      
 
. Intake/Output Summary (Last 24 hours) at 2/12/2019 8428 Last data filed at 2/12/2019 6347 Gross per 24 hour Intake 370 ml Output  Net 370 ml Admission weight:Weight: 61.2 kg (135 lb) (02/08/19 0706) Last Weight Metrics: 
Weight Loss Metrics 2/11/2019 10/30/2018 Today's Wt 121 lb 139 lb BMI 20.14 kg/m2 23.13 kg/m2 Physical Exam:  
 
General: No acute distress. Neck: no jvd. LUNGS: good air entry, bl exp rhonchi. CVS EXM: S1, S2, no murmur, rub or gallop. Abdomen: Soft, Non Tender, slightly distended, non tense ascites. Lower Extremities: No Edema. Access: lt arm avf, good thrill, dressings intact. Labs: CBC w/Diff Recent Labs  
  02/12/19 
0600 02/11/19 
0540 02/10/19 
0431 WBC 11.6 9.5 10.6 RBC 3.13* 3.47* 3.88* HGB 8.2* 8.9* 10.1* HCT 24.4* 26.7* 30.7* PLT 74* 103* 105* GRANS 87* 83* 85* LYMPH 9* 11* 10* EOS 0 0 1 Chemistry Recent Labs  
  02/12/19 0600 02/11/19 
0540 02/10/19 
0431 GLU 84 63* 56*  137 137  
K 4.0 3.9 4.1 CL 95* 96* 95* CO2 28 28 27 BUN 34* 29* 21* CREA 5.86* 5.22* 4.35* CA 7.5* 7.9* 8.7 AGAP 13 13 15 BUCR 6* 6* 5* *  --   --   
TP 5.3*  --   --   
ALB 1.3* 1.3* 1.4*  
GLOB 4.0  --   --   
AGRAT 0.3*  --   --   
PHOS  --  4.9 4.5 Lab Results Component Value Date/Time Iron 67 02/09/2019 05:48 AM  
 TIBC 143 (L) 02/09/2019 05:48 AM  
 Iron % saturation 47 02/09/2019 05:48 AM  
  
Lab Results Component Value Date/Time Calcium 7.5 (L) 02/12/2019 06:00 AM  
 CALCIUM,IONIZED 4.50 10/30/2018 11:15 AM  
 Phosphorus 4.9 02/11/2019 05:40 AM  
  
 
 
Randi Noble M.D [facility] Office [phone] Pager [phone]

## 2019-02-12 NOTE — PROGRESS NOTES
Internal Medicine Progress Note Patient's Name: Donis Tobar Admit Date: 2/8/2019 Length of Stay: 4 Assessment/Plan Principal Problem: 
  SBP (spontaneous bacterial peritonitis) ([address] (2/12/2019) Active Problems: 
  ESRD (end stage renal disease) on dialysis ([address] (2/8/2019) Cholestatic jaundice (2/12/2019) UTI (urinary tract infection) (2/8/2019) Metabolic encephalopathy (9/8/9770) Cirrhosis ([address] (2/8/2019) COPD (chronic obstructive pulmonary disease) ([facility] (2/8/2019) Hyperbilirubinemia (2/8/2019) Combined systolic and diastolic heart failure ([facility] (2/10/2019) SBP 
- cont rocephin 
- paracentesis done 2/11, cell count profile consistent with bacterial peritonitis ESRD on dialysis 
- nephro consult - appreciate assistance 
- HD on 2/9 
- hold dialysis on 2/12 d/t HOTN - midodrine added Cholestatic jaundice 
- CT abd results below - GI consult - appreciate assistance - serologies, paracentesis to eval fluid, start lactulose/rifaximin, vit K x3 days - MRCP w/o contrast, transjugular liver bx UTI 
- iv rocephin x5 days - UCx - negative 
  
Metabolic encephalopathy 
- Head CT - no acute abnormality 
- monitor neuro status, improving 
  
HF 
- cardio consult - appreciate assistance COPD 
- brovana, pulmicort 
- O2 stable on RA 
 
 
- PT recommending SNF 
- Cont acceptable home medications for chronic conditions  
- DVT protocol I have personally reviewed all pertinent labs and films that have officially resulted over the last 24 hours. I have personally checked for all pending labs that are awaiting final results. Interval History Donis Tobar is a 46 y.o. female with a PMHx ESRD on dialysis, DM, CAD, COPD, syst/diastolic heart failure, HTN, chronic liver disease who presented to the ED from dialysis due to AMS. History limited 2/2 AMS. ED eval revealed scleral icterus, confusion, leukocytosis, UTI. Hyperbilirubinemia (21.7) and elevated alk phos (571) are similar to recent ED visit at [facility] on 2/1. Review of prior records from [facility] revealed she was supposed to have a percutaneous gallbladder drain placed on 2/4/19. Head CT neg. Pt will be admitted for further evaluation. IV rocephin started. Nephrology consulted - HD on 2/9 as patient missed the prior day. CT abd without contrast ordered. GI consulted - serologies, paracentesis to eval fluid, start lactulose/rifaximin, vit K x3 days, cardio consult. UCx negative. PT recommending SNF. Paracentesis performed 2/11 - removed 100cc dark jed fluid, cell count profile consistent with SBP. Pt already on rocephin. Fluid cx ngtd. Midodrine added for HOTN. MRCP w/o contrast and transjugular liver bx ordered per GI. Subjective Pt s/e @ bedside. No major events overnight. Pt sleeping. Objective Visit Vitals BP 92/48 Pulse (!) 56 Temp 94.9 °F (34.9 °C) Resp 16 Ht 5' 5\" (1.651 m) Wt 54.9 kg (121 lb) SpO2 96% BMI 20.14 kg/m² Physical Exam: 
General Appearance: NAD, confused HEENT: normocephalic/atraumatic, moist mucus membranes, scleral icterus Neck: No JVD, supple Lungs: CTA with normal respiratory effort CV: RRR, no m/r/g Abdomen: soft, non-tender, normal bowel sounds Extremities: no cyanosis, no peripheral edema Neuro: No focal deficits, motor/sensory intact Intake and Output: 
Current Shift:  No intake/output data recorded. Last three shifts:  02/10 1901 - 02/12 0700 In: 370 [P.O.:120; I.V.:250] Out: -  
 
Lab/Data Reviewed: 
BMP:  
Lab Results Component Value Date/Time   02/12/2019 06:00 AM  
 K 4.0 02/12/2019 06:00 AM  
 CL 95 (L) 02/12/2019 06:00 AM  
 CO2 28 02/12/2019 06:00 AM  
 AGAP 13 02/12/2019 06:00 AM  
 GLU 84 02/12/2019 06:00 AM  
 BUN 34 (H) 02/12/2019 06:00 AM  
 CREA 5.86 (H) 02/12/2019 06:00 AM  
 GFRAA 9 (L) 02/12/2019 06:00 AM  
 GFRNA 8 (L) 02/12/2019 06:00 AM  
 
CBC:  
 Lab Results Component Value Date/Time WBC 11.6 02/12/2019 06:00 AM  
 HGB 8.2 (L) 02/12/2019 06:00 AM  
 HCT 24.4 (L) 02/12/2019 06:00 AM  
 PLT 74 (L) 02/12/2019 06:00 AM  
 
 
Imaging Reviewed: 
No results found. Medications Reviewed: 
Current Facility-Administered Medications Medication Dose Route Frequency  LORazepam (ATIVAN) tablet 0.5 mg  0.5 mg Oral Q12H PRN  
 midodrine (PROAMITINE) tablet 5 mg  5 mg Oral Q8H  
 rifAXIMin (XIFAXAN) tablet 550 mg  550 mg Oral BID  lactulose (CHRONULAC) solution 10 g  15 mL Oral TID  phytonadione (VITAMIN K) 1 mg/mL oral solution 5 mg  5 mg Oral DAILY  cefTRIAXone (ROCEPHIN) 1 g in 0.9% sodium chloride (MBP/ADV) 50 mL MBP  1 g IntraVENous Q24H  B complex-vitamin C-folic acid (NEPHRO-NACHO) 0.8 mg tab  1 Tab Oral DAILY  sevelamer carbonate (RENVELA) tab 800 mg  800 mg Oral TID WITH MEALS  
 arformoterol (BROVANA) neb solution 15 mcg  15 mcg Nebulization BID RT And  
 budesonide (PULMICORT) 500 mcg/2 ml nebulizer suspension  500 mcg Nebulization BID RT Semaj Kim PA-C [address] [place], [place] [address] Hospitalist Division Office:  [phone] Pager: [phone]

## 2019-02-12 NOTE — PROGRESS NOTES
Problem: Pressure Injury - Risk of 
Goal: *Prevention of pressure injury Document Honorio Scale and appropriate interventions in the flowsheet. Outcome: Progressing Towards Goal 
Pressure Injury Interventions: 
Sensory Interventions: Assess changes in LOC, Float heels, Turn and reposition approx. every two hours (pillows and wedges if needed) Moisture Interventions: Absorbent underpads, Apply protective barrier, creams and emollients, Offer toileting Q_hr Activity Interventions: Pressure redistribution bed/mattress(bed type), PT/OT evaluation Mobility Interventions: Pressure redistribution bed/mattress (bed type), PT/OT evaluation Nutrition Interventions: Document food/fluid/supplement intake Friction and Shear Interventions: Apply protective barrier, creams and emollients Problem: Falls - Risk of 
Goal: *Absence of Falls Document [place] Fall Risk and appropriate interventions in the flowsheet. Outcome: Progressing Towards Goal 
Fall Risk Interventions: 
Mobility Interventions: Communicate number of staff needed for ambulation/transfer Mentation Interventions: Adequate sleep, hydration, pain control Medication Interventions: Bed/chair exit alarm Elimination Interventions: Bed/chair exit alarm, Call light in reach History of Falls Interventions: Bed/chair exit alarm

## 2019-02-12 NOTE — PROGRESS NOTES
2/12/19 1:45pm- After 2 doses of sedation, pt unable to stay still. Multiple attempts to scan pt but she was taking coil off and trying to get off the table. Sent pt back to bed.  Letha De La Cruz RN. Mikey Kenny

## 2019-02-12 NOTE — PROGRESS NOTES
Problem: Pressure Injury - Risk of 
Goal: *Prevention of pressure injury Document Honorio Scale and appropriate interventions in the flowsheet. Outcome: Progressing Towards Goal 
Pressure Injury Interventions: 
Sensory Interventions: Pressure redistribution bed/mattress (bed type), Turn and reposition approx. every two hours (pillows and wedges if needed), Minimize linen layers, Maintain/enhance activity level, Keep linens dry and wrinkle-free, Pad between skin to skin Moisture Interventions: Absorbent underpads, Apply protective barrier, creams and emollients, Maintain skin hydration (lotion/cream), Minimize layers, Moisture barrier, Check for incontinence Q2 hours and as needed Activity Interventions: Pressure redistribution bed/mattress(bed type) Mobility Interventions: Pressure redistribution bed/mattress (bed type), HOB 30 degrees or less, Turn and reposition approx. every two hours(pillow and wedges), PT/OT evaluation Nutrition Interventions: Document food/fluid/supplement intake Friction and Shear Interventions: Apply protective barrier, creams and emollients, HOB 30 degrees or less, Lift sheet, Minimize layers Problem: Falls - Risk of 
Goal: *Absence of Falls Document [place] Rank Fall Risk and appropriate interventions in the flowsheet. Outcome: Progressing Towards Goal 
Fall Risk Interventions: 
Mobility Interventions: Communicate number of staff needed for ambulation/transfer, Patient to call before getting OOB, Bed/chair exit alarm Mentation Interventions: Adequate sleep, hydration, pain control, Bed/chair exit alarm, Door open when patient unattended, More frequent rounding, Room close to nurse's station, Update white board Medication Interventions: Bed/chair exit alarm, Evaluate medications/consider consulting pharmacy, Teach patient to arise slowly, Patient to call before getting OOB Elimination Interventions: Bed/chair exit alarm, Call light in reach, Patient to call for help with toileting needs, Toileting schedule/hourly rounds History of Falls Interventions: Bed/chair exit alarm, Door open when patient unattended, Evaluate medications/consider consulting pharmacy, Room close to nurse's station

## 2019-02-12 NOTE — PROGRESS NOTES
Holding OT evaluation for today as pt has been hypotensive overnight and this AM. 0800: BP 89/43. 1030: BP 90/40. Will follow up on 2/13/19. Thank you. Geoffrey Downs MS OTR/L Office Ext: [phone] Pager: [phone]

## 2019-02-12 NOTE — PROGRESS NOTES
20:30- Family at the bedside-MRI questionnaire completed and E-signed. Assessment completed- see flow sheet. Bed alarm on for safety. Patient awakens to verbal stimuli and returns to sleep (lethargic) 22:00- Patient refused scheduled lactulose. 01:20- B/P 89/45  Patient remains lethargic. Poor intake- 120 cc intake charted on previous shift. DR Asencio notified regarding hypotension- see new order. 03:10- LR bolus completed- B/P still hypotensive 81/44-  Mental status unchanged -rem Periods of sitting up in the bed and some restlessness. Dr Gabriela Fan notified of B/P continued hypotensive (81/44) No New orders-continue to monitor. 03:20- Patient sitting up in the bed - B/P reading 172?145 (inaccurate). No new orders from Dr Gabriela Fan- continue to monitor. America Mcintyre, RN Charge nurse aware. 04:10- B/P= 95/45 Patient condition unchanged from previous. Continue to monitor. 05:00- patient more awake - alert but remains confused and oriented x1. Incontinence care given (incontinent of small loose brown stool) 07:25- Report to oncoming nurse, Sivakumar Ellis RN.

## 2019-02-12 NOTE — PROGRESS NOTES
Bedside shift change report given to Dorothy RN (oncoming nurse) by Lara Loving, RUPINDER (offgoing nurse). Report included the following information SBAR, Kardex, Intake/Output, MAR and Recent Results. [facility] (Dialysis) report. 1297 -- Spoke with Dr. Brian Crawford, 500 bolus for BP 89/43. AM medications held, well tolerated, will continue to monitor. 
   
1145 -- Reassessment completed, no change in patient condition, will continue to monitor. 1444 -- Antihypotensive med given. 
   
1551 -- Reassessment completed, no change in patient condition, will continue to monitor. 1559 -- Vitals taken per [place] Bias. 1710 -- meds given. 
   
Bedside shift change report given to Yariel Mccauley RN (oncoming nurse) by Lamar Woods RN (offgoing nurse). Report included the following information SBAR, Kardex, Intake/Output, MAR and Recent Results.

## 2019-02-12 NOTE — PROGRESS NOTES
Patient is unable to communicate at this time.  offered prayer and left Spiritual Care brochure. Chaplains will continue to follow and will provide pastoral care on an as needed/requested basis. Blank Melendez Board Certified [employer] Spiritual Care  
[phone]

## 2019-02-12 NOTE — PROGRESS NOTES
[facility]  -  Progress Note Patient: Dayanara Muir MRN: [id number]  SSN: [id number] YOB: 1970  Age: 46 y.o. Sex: female Admit Date: 2/8/2019 I saw, evaluated, interviewed and examined the patient personally. I agree with the findings and plan of care as documented below with PA-C note Cardiology was asked to help manage congestive heart failure which may be causing elevated liver enzymes. In my opinion, such a high level of total bilirubin and alkaline phosphatase and elevated INR is less likely from congestive hepatopathy. Her AST and ALT is normal.  In my opinion, and congestive heart failure causing passive congestion causes less of elevation of total bilirubin. Usually alkaline phosphatase is normal.  Transaminases elevation are usually 2-3 fold from normal level which is normal in this patient. This does not rule out congestive heart failure. This enzyme elevation could be from biliary obstructive process /cholastatic jaundince. I will defer this to GI team. Gi plan for MRCP noted. Concern for Spontaneous bacterial peritonitis. Defer to GI and primary team 
Volume management per renal team. Does not appear to be significantly volume overloaded at this time Coreg and angiotensin receptor blocker are on hold because of low blood pressure. This can be restarted when blood pressure allows. No further cardiac work up planned at this time. Will be available as needed. Please call us back with questions.  
 
David Tyler MD 
 
 
 
 
Assessment:  
 
-Presented from dialysis 2/8/19 due to altered mental status over the prior 5 days but worsened day of presentation. Being treated for UTI - UA with TNTC WBC, 4_ bacteria, large LE, + nitrites; UCx NGTD 
-ESRD, on HD 
-Hx CAD, s/p RCA TAMMY 2015 
-Nuclear stress test 10/16/2018: inferolateral wall infarct without definite evidence of ischemia, Ef 19% -AMINAH 10/30/2018: Mildly enlarged LV with severely reduced systolic function, estimated EF 20%, enlarged RV with reduced systolic function, mild mitral and tricuspid regurgitation, trivial aortic regurgitation 
-Cardiomyopathy, Echo 11/29/2018 ([facility]) with EF 38% (no significant change from 12/11/2016) with hypokinetic anteroseptal, septal and inferoseptal walls, upper normal LV cavity size, mildly dilated RV, normal RV systolic function, PASP estimated at 54 mmHg c/w moderate pulmonary hypertension -HTN 
-DM 
-HLD 
-Anemia 
-Thrombocytopenia -Hypoalbuminemia 
-COPD 
  
Primary cardiologist is Dr. Ubaldo Medina ([id number] [facility]) Plan:  
 
-Coreg has been d/c due to hypotension. No ACEi/ARB/Entresto due to hypotension. -S/p paracentesis yesterday by IR team, appreciate assistance. -Further GI evaluation per GI team, appreciate assistance. -Volume management per nephrology team, appreciate assistance. Noted plan to hold off on dialysis today due to hypotension. Subjective:  
 
Undergoing breathing treatment at time of evaluation. Objective:  
  
Patient Vitals for the past 8 hrs: 
 Temp Pulse Resp BP SpO2  
02/12/19 0851     98 % 02/12/19 0803 (!) 94.2 °F (34.6 °C) (!) 58 16 (!) 89/43 94 % 02/12/19 0410 97 °F (36.1 °C) 60 16 95/45 95 % 02/12/19 0320    (!) 172/145   
02/12/19 0310    (!) 81/44  Patient Vitals for the past 96 hrs: 
 Weight  
02/11/19 1412 121 lb (54.9 kg) 02/11/19 0600 121 lb 4.8 oz (55 kg) 02/10/19 0548 121 lb 4.1 oz (55 kg) 02/09/19 0050 125 lb 9.6 oz (57 kg) Intake/Output Summary (Last 24 hours) at 2/12/2019 1022 Last data filed at 2/12/2019 8169 Gross per 24 hour Intake 370 ml Output  Net 370 ml Physical Exam: 
General:  cooperative, no apparent distress Neck:  No JVD Lungs:  clear to auscultation bilaterally to anterolateral lung fields, receiving breathing treatment with mask Heart:  Regular rate and rhythm Abdomen:  abdomen is mildly distended Extremities:  Atraumatic, no edema Data Review:  
 
Labs: Results:  
   
Chemistry Recent Labs  
  02/12/19 
0600 02/11/19 
0540 02/10/19 
0431 GLU 84 63* 56*  137 137  
K 4.0 3.9 4.1 CL 95* 96* 95* CO2 28 28 27 BUN 34* 29* 21* CREA 5.86* 5.22* 4.35* CA 7.5* 7.9* 8.7 PHOS  --  4.9 4.5 AGAP 13 13 15 BUCR 6* 6* 5* *  --   --   
TP 5.3*  --   --   
ALB 1.3* 1.3* 1.4*  
GLOB 4.0  --   --   
AGRAT 0.3*  --   --   
  
CBC w/Diff Recent Labs  
  02/12/19 
0600 02/11/19 
0540 02/10/19 
0431 WBC 11.6 9.5 10.6 RBC 3.13* 3.47* 3.88* HGB 8.2* 8.9* 10.1* HCT 24.4* 26.7* 30.7* PLT 74* 103* 105* GRANS 87* 83* 85* LYMPH 9* 11* 10* EOS 0 0 1 Coagulation Recent Labs  
  02/12/19 
0600 02/11/19 
0540 PTP 18.3* 19.5* INR 1.5* 1.7* Liver Enzymes Recent Labs  
  02/12/19 0600 TP 5.3* ALB 1.3* * SGOT 43*

## 2019-02-12 NOTE — PROGRESS NOTES
Patient with AMS at this time and unable to participate in discharge planning at this time. Patient  Has no long term insurance listed have asked Bebo Peterson / to see if she qualifies for medicaid.

## 2019-02-13 ENCOUNTER — ANESTHESIA (OUTPATIENT)
Dept: ENDOSCOPY | Age: 49
DRG: 871 | End: 2019-02-13
Payer: MEDICARE

## 2019-02-13 ENCOUNTER — ANESTHESIA EVENT (OUTPATIENT)
Dept: ENDOSCOPY | Age: 49
DRG: 871 | End: 2019-02-13
Payer: MEDICARE

## 2019-02-13 ENCOUNTER — APPOINTMENT (OUTPATIENT)
Dept: GENERAL RADIOLOGY | Age: 49
DRG: 871 | End: 2019-02-13
Attending: INTERNAL MEDICINE
Payer: MEDICARE

## 2019-02-13 PROBLEM — I95.9 HYPOTENSION: Status: ACTIVE | Noted: 2019-02-13

## 2019-02-13 LAB
ALBUMIN SERPL-MCNC: 2.4 G/DL (ref 3.4–5)
ALBUMIN/GLOB SERPL: 0.7 {RATIO} (ref 0.8–1.7)
ALP SERPL-CCNC: 540 U/L (ref 45–117)
ALT SERPL-CCNC: 28 U/L (ref 13–56)
ANION GAP SERPL CALC-SCNC: 17 MMOL/L (ref 3–18)
ARTERIAL PATENCY WRIST A: YES
AST SERPL-CCNC: 34 U/L (ref 15–37)
BASE EXCESS BLD CALC-SCNC: 0 MMOL/L
BASOPHILS # BLD: 0 K/UL (ref 0–0.1)
BASOPHILS NFR BLD: 0 % (ref 0–2)
BDY SITE: ABNORMAL
BILIRUB SERPL-MCNC: 17.7 MG/DL (ref 0.2–1)
BODY TEMPERATURE: 97.9
BUN SERPL-MCNC: 38 MG/DL (ref 7–18)
BUN/CREAT SERPL: 6 (ref 12–20)
CALCIUM SERPL-MCNC: 7.6 MG/DL (ref 8.5–10.1)
CHLORIDE SERPL-SCNC: 95 MMOL/L (ref 100–108)
CO2 SERPL-SCNC: 24 MMOL/L (ref 21–32)
CREAT SERPL-MCNC: 6.5 MG/DL (ref 0.6–1.3)
DIFFERENTIAL METHOD BLD: ABNORMAL
EOSINOPHIL # BLD: 0 K/UL (ref 0–0.4)
EOSINOPHIL NFR BLD: 0 % (ref 0–5)
ERYTHROCYTE [DISTWIDTH] IN BLOOD BY AUTOMATED COUNT: 24.5 % (ref 11.6–14.5)
GAS FLOW.O2 O2 DELIVERY SYS: ABNORMAL L/MIN
GAS FLOW.O2 SETTING OXYMISER: 14 BPM
GLOBULIN SER CALC-MCNC: 3.3 G/DL (ref 2–4)
GLUCOSE SERPL-MCNC: 73 MG/DL (ref 74–99)
HCG SERPL QL: NEGATIVE
HCO3 BLD-SCNC: 24.9 MMOL/L (ref 22–26)
HCT VFR BLD AUTO: 21.2 % (ref 35–45)
HGB BLD-MCNC: 7.2 G/DL (ref 12–16)
INR PPP: 1.6 (ref 0.8–1.2)
INSPIRATION.DURATION SETTING TIME VENT: 1 SEC
LACTATE SERPL-SCNC: 1.2 MMOL/L (ref 0.4–2)
LYMPHOCYTES # BLD: 0.9 K/UL (ref 0.9–3.6)
LYMPHOCYTES NFR BLD: 7 % (ref 21–52)
MCH RBC QN AUTO: 26 PG (ref 24–34)
MCHC RBC AUTO-ENTMCNC: 34 G/DL (ref 31–37)
MCV RBC AUTO: 76.5 FL (ref 74–97)
MONOCYTES # BLD: 0.7 K/UL (ref 0.05–1.2)
MONOCYTES NFR BLD: 6 % (ref 3–10)
NEUTS SEG # BLD: 10.5 K/UL (ref 1.8–8)
NEUTS SEG NFR BLD: 87 % (ref 40–73)
O2/TOTAL GAS SETTING VFR VENT: 0.35 %
PCO2 BLD: 38.8 MMHG (ref 35–45)
PEEP RESPIRATORY: 5 CMH2O
PH BLD: 7.41 [PH] (ref 7.35–7.45)
PIP ISTAT,IPIP: 27
PLATELET # BLD AUTO: 58 K/UL (ref 135–420)
PO2 BLD: 120 MMHG (ref 80–100)
POTASSIUM SERPL-SCNC: 3.9 MMOL/L (ref 3.5–5.5)
PROT SERPL-MCNC: 5.7 G/DL (ref 6.4–8.2)
PROTHROMBIN TIME: 18.8 SEC (ref 11.5–15.2)
RBC # BLD AUTO: 2.77 M/UL (ref 4.2–5.3)
SAO2 % BLD: 99 % (ref 92–97)
SERVICE CMNT-IMP: ABNORMAL
SODIUM SERPL-SCNC: 136 MMOL/L (ref 136–145)
SPECIMEN TYPE: ABNORMAL
TOTAL RESP. RATE, ITRR: 14
VENTILATION MODE VENT: ABNORMAL
VOLUME CONTROL PLUS IVLCP: YES
VT SETTING VENT: 400 ML
WBC # BLD AUTO: 12.1 K/UL (ref 4.6–13.2)

## 2019-02-13 PROCEDURE — 76040000007: Performed by: INTERNAL MEDICINE

## 2019-02-13 PROCEDURE — 84703 CHORIONIC GONADOTROPIN ASSAY: CPT

## 2019-02-13 PROCEDURE — 02HV33Z INSERTION OF INFUSION DEVICE INTO SUPERIOR VENA CAVA, PERCUTANEOUS APPROACH: ICD-10-PCS | Performed by: INTERNAL MEDICINE

## 2019-02-13 PROCEDURE — 80053 COMPREHEN METABOLIC PANEL: CPT

## 2019-02-13 PROCEDURE — 74011250637 HC RX REV CODE- 250/637: Performed by: HOSPITALIST

## 2019-02-13 PROCEDURE — 74011250636 HC RX REV CODE- 250/636: Performed by: FAMILY MEDICINE

## 2019-02-13 PROCEDURE — 74011000250 HC RX REV CODE- 250: Performed by: HOSPITALIST

## 2019-02-13 PROCEDURE — 77030029184 HC NEB SOLO AERONEB AEPM -A

## 2019-02-13 PROCEDURE — 76060000032 HC ANESTHESIA 0.5 TO 1 HR: Performed by: INTERNAL MEDICINE

## 2019-02-13 PROCEDURE — 65610000006 HC RM INTENSIVE CARE

## 2019-02-13 PROCEDURE — 74011250636 HC RX REV CODE- 250/636: Performed by: INTERNAL MEDICINE

## 2019-02-13 PROCEDURE — 74018 RADEX ABDOMEN 1 VIEW: CPT

## 2019-02-13 PROCEDURE — 74011000258 HC RX REV CODE- 258

## 2019-02-13 PROCEDURE — 74011250636 HC RX REV CODE- 250/636

## 2019-02-13 PROCEDURE — 74011000258 HC RX REV CODE- 258: Performed by: INTERNAL MEDICINE

## 2019-02-13 PROCEDURE — 94002 VENT MGMT INPAT INIT DAY: CPT

## 2019-02-13 PROCEDURE — 0DJ08ZZ INSPECTION OF UPPER INTESTINAL TRACT, VIA NATURAL OR ARTIFICIAL OPENING ENDOSCOPIC: ICD-10-PCS | Performed by: INTERNAL MEDICINE

## 2019-02-13 PROCEDURE — 74011250637 HC RX REV CODE- 250/637: Performed by: INTERNAL MEDICINE

## 2019-02-13 PROCEDURE — 82803 BLOOD GASES ANY COMBINATION: CPT

## 2019-02-13 PROCEDURE — 74011000250 HC RX REV CODE- 250

## 2019-02-13 PROCEDURE — 36415 COLL VENOUS BLD VENIPUNCTURE: CPT

## 2019-02-13 PROCEDURE — 83605 ASSAY OF LACTIC ACID: CPT

## 2019-02-13 PROCEDURE — 94640 AIRWAY INHALATION TREATMENT: CPT

## 2019-02-13 PROCEDURE — 0BH17EZ INSERTION OF ENDOTRACHEAL AIRWAY INTO TRACHEA, VIA NATURAL OR ARTIFICIAL OPENING: ICD-10-PCS | Performed by: INTERNAL MEDICINE

## 2019-02-13 PROCEDURE — 31500 INSERT EMERGENCY AIRWAY: CPT

## 2019-02-13 PROCEDURE — 90935 HEMODIALYSIS ONE EVALUATION: CPT

## 2019-02-13 PROCEDURE — 74011250636 HC RX REV CODE- 250/636: Performed by: PHYSICIAN ASSISTANT

## 2019-02-13 PROCEDURE — 85025 COMPLETE CBC W/AUTO DIFF WBC: CPT

## 2019-02-13 PROCEDURE — 36556 INSERT NON-TUNNEL CV CATH: CPT

## 2019-02-13 PROCEDURE — 36600 WITHDRAWAL OF ARTERIAL BLOOD: CPT

## 2019-02-13 PROCEDURE — 0FJD8ZZ INSPECTION OF PANCREATIC DUCT, VIA NATURAL OR ARTIFICIAL OPENING ENDOSCOPIC: ICD-10-PCS | Performed by: INTERNAL MEDICINE

## 2019-02-13 PROCEDURE — 74011000250 HC RX REV CODE- 250: Performed by: INTERNAL MEDICINE

## 2019-02-13 PROCEDURE — 77030019957 HC CUF BLN GASTSCP OCOA -B: Performed by: INTERNAL MEDICINE

## 2019-02-13 PROCEDURE — 0FJB8ZZ INSPECTION OF HEPATOBILIARY DUCT, VIA NATURAL OR ARTIFICIAL OPENING ENDOSCOPIC: ICD-10-PCS | Performed by: INTERNAL MEDICINE

## 2019-02-13 PROCEDURE — C1751 CATH, INF, PER/CENT/MIDLINE: HCPCS

## 2019-02-13 PROCEDURE — 85610 PROTHROMBIN TIME: CPT

## 2019-02-13 PROCEDURE — 5A1945Z RESPIRATORY VENTILATION, 24-96 CONSECUTIVE HOURS: ICD-10-PCS | Performed by: INTERNAL MEDICINE

## 2019-02-13 PROCEDURE — 71045 X-RAY EXAM CHEST 1 VIEW: CPT

## 2019-02-13 RX ORDER — LIDOCAINE HYDROCHLORIDE 10 MG/ML
INJECTION INFILTRATION; PERINEURAL
Status: COMPLETED
Start: 2019-02-13 | End: 2019-02-13

## 2019-02-13 RX ORDER — IPRATROPIUM BROMIDE AND ALBUTEROL SULFATE 2.5; .5 MG/3ML; MG/3ML
3 SOLUTION RESPIRATORY (INHALATION)
Status: DISCONTINUED | OUTPATIENT
Start: 2019-02-13 | End: 2019-02-19

## 2019-02-13 RX ORDER — ALBUMIN HUMAN 250 G/1000ML
25 SOLUTION INTRAVENOUS
Status: ACTIVE | OUTPATIENT
Start: 2019-02-13 | End: 2019-02-13

## 2019-02-13 RX ORDER — DIAZEPAM 10 MG/2ML
10 INJECTION INTRAMUSCULAR ONCE
Status: COMPLETED | OUTPATIENT
Start: 2019-02-13 | End: 2019-02-13

## 2019-02-13 RX ORDER — NOREPINEPHRINE BIT/0.9 % NACL 8 MG/250ML
2-16 INFUSION BOTTLE (ML) INTRAVENOUS
Status: DISCONTINUED | OUTPATIENT
Start: 2019-02-13 | End: 2019-02-13

## 2019-02-13 RX ORDER — MIDAZOLAM HYDROCHLORIDE 1 MG/ML
2 INJECTION, SOLUTION INTRAMUSCULAR; INTRAVENOUS ONCE
Status: COMPLETED | OUTPATIENT
Start: 2019-02-13 | End: 2019-02-13

## 2019-02-13 RX ORDER — EPHEDRINE SULFATE 50 MG/ML
INJECTION, SOLUTION INTRAVENOUS AS NEEDED
Status: DISCONTINUED | OUTPATIENT
Start: 2019-02-13 | End: 2019-02-13 | Stop reason: HOSPADM

## 2019-02-13 RX ORDER — MIDAZOLAM HYDROCHLORIDE 1 MG/ML
INJECTION, SOLUTION INTRAMUSCULAR; INTRAVENOUS
Status: COMPLETED
Start: 2019-02-13 | End: 2019-02-13

## 2019-02-13 RX ORDER — SODIUM CHLORIDE 9 MG/ML
INJECTION, SOLUTION INTRAVENOUS
Status: DISCONTINUED | OUTPATIENT
Start: 2019-02-13 | End: 2019-02-13 | Stop reason: HOSPADM

## 2019-02-13 RX ORDER — FENTANYL CITRATE 50 UG/ML
50 INJECTION, SOLUTION INTRAMUSCULAR; INTRAVENOUS ONCE
Status: COMPLETED | OUTPATIENT
Start: 2019-02-13 | End: 2019-02-13

## 2019-02-13 RX ORDER — PROPOFOL 10 MG/ML
INJECTION, EMULSION INTRAVENOUS AS NEEDED
Status: DISCONTINUED | OUTPATIENT
Start: 2019-02-13 | End: 2019-02-13 | Stop reason: HOSPADM

## 2019-02-13 RX ORDER — NOREPINEPHRINE BIT/0.9 % NACL 8 MG/250ML
2-16 INFUSION BOTTLE (ML) INTRAVENOUS
Status: DISCONTINUED | OUTPATIENT
Start: 2019-02-13 | End: 2019-02-18

## 2019-02-13 RX ORDER — ETOMIDATE 2 MG/ML
10 INJECTION INTRAVENOUS ONCE
Status: COMPLETED | OUTPATIENT
Start: 2019-02-13 | End: 2019-02-13

## 2019-02-13 RX ORDER — IPRATROPIUM BROMIDE AND ALBUTEROL SULFATE 2.5; .5 MG/3ML; MG/3ML
3 SOLUTION RESPIRATORY (INHALATION)
Status: DISCONTINUED | OUTPATIENT
Start: 2019-02-13 | End: 2019-02-13

## 2019-02-13 RX ORDER — FENTANYL CITRATE 50 UG/ML
INJECTION, SOLUTION INTRAMUSCULAR; INTRAVENOUS
Status: COMPLETED
Start: 2019-02-13 | End: 2019-02-13

## 2019-02-13 RX ADMIN — LACTULOSE 10 G: 20 SOLUTION ORAL at 09:20

## 2019-02-13 RX ADMIN — FENTANYL CITRATE 50 MCG: 50 INJECTION, SOLUTION INTRAMUSCULAR; INTRAVENOUS at 16:17

## 2019-02-13 RX ADMIN — PROPOFOL 50 MG: 10 INJECTION, EMULSION INTRAVENOUS at 13:01

## 2019-02-13 RX ADMIN — SODIUM CHLORIDE, SODIUM LACTATE, POTASSIUM CHLORIDE, AND CALCIUM CHLORIDE 250 ML: 600; 310; 30; 20 INJECTION, SOLUTION INTRAVENOUS at 02:52

## 2019-02-13 RX ADMIN — RIFAXIMIN 550 MG: 550 TABLET ORAL at 09:21

## 2019-02-13 RX ADMIN — ARFORMOTEROL TARTRATE 15 MCG: 15 SOLUTION RESPIRATORY (INHALATION) at 08:23

## 2019-02-13 RX ADMIN — EPHEDRINE SULFATE 10 MG: 50 INJECTION, SOLUTION INTRAVENOUS at 13:13

## 2019-02-13 RX ADMIN — Medication 10 MG: at 10:01

## 2019-02-13 RX ADMIN — PROPOFOL 10 MG: 10 INJECTION, EMULSION INTRAVENOUS at 13:08

## 2019-02-13 RX ADMIN — BUDESONIDE 500 MCG: 0.5 INHALANT RESPIRATORY (INHALATION) at 08:23

## 2019-02-13 RX ADMIN — SEVELAMER CARBONATE 800 MG: 800 TABLET, FILM COATED ORAL at 09:22

## 2019-02-13 RX ADMIN — EPHEDRINE SULFATE 10 MG: 50 INJECTION, SOLUTION INTRAVENOUS at 13:12

## 2019-02-13 RX ADMIN — MIDODRINE HYDROCHLORIDE 5 MG: 2.5 TABLET ORAL at 09:21

## 2019-02-13 RX ADMIN — CEFTRIAXONE 1 G: 1 INJECTION, POWDER, FOR SOLUTION INTRAMUSCULAR; INTRAVENOUS at 18:44

## 2019-02-13 RX ADMIN — NOREPINEPHRINE BITARTRATE 2 MCG/MIN: 1 INJECTION INTRAVENOUS at 14:04

## 2019-02-13 RX ADMIN — IPRATROPIUM BROMIDE AND ALBUTEROL SULFATE 3 ML: .5; 3 SOLUTION RESPIRATORY (INHALATION) at 20:13

## 2019-02-13 RX ADMIN — LIDOCAINE HYDROCHLORIDE: 10 INJECTION, SOLUTION INFILTRATION; PERINEURAL at 16:00

## 2019-02-13 RX ADMIN — SODIUM CHLORIDE: 9 INJECTION, SOLUTION INTRAVENOUS at 12:45

## 2019-02-13 RX ADMIN — BUDESONIDE 500 MCG: 0.5 INHALANT RESPIRATORY (INHALATION) at 20:13

## 2019-02-13 RX ADMIN — PROPOFOL 10 MG: 10 INJECTION, EMULSION INTRAVENOUS at 13:05

## 2019-02-13 RX ADMIN — MIDAZOLAM 2 MG: 1 INJECTION INTRAMUSCULAR; INTRAVENOUS at 16:45

## 2019-02-13 RX ADMIN — ARFORMOTEROL TARTRATE 15 MCG: 15 SOLUTION RESPIRATORY (INHALATION) at 20:14

## 2019-02-13 RX ADMIN — ETOMIDATE 10 MG: 20 INJECTION, SOLUTION INTRAVENOUS at 16:17

## 2019-02-13 RX ADMIN — SODIUM CHLORIDE 0.4 MCG/KG/HR: 900 INJECTION, SOLUTION INTRAVENOUS at 17:01

## 2019-02-13 RX ADMIN — MIDAZOLAM HYDROCHLORIDE 2 MG: 1 INJECTION, SOLUTION INTRAMUSCULAR; INTRAVENOUS at 16:45

## 2019-02-13 RX ADMIN — Medication 1 TABLET: at 09:22

## 2019-02-13 RX ADMIN — ERYTHROPOIETIN 10000 UNITS: 10000 INJECTION, SOLUTION INTRAVENOUS; SUBCUTANEOUS at 22:16

## 2019-02-13 NOTE — MED STUDENT NOTES
Today is hospital day #6 for Ms. Diane Woodruff who was admitted for SBP. Patient is a 45 yo female with a history of ESRD and DM who presented to the ED with AMS. ED eval - leukocytosis, hyponatremic Na 131, glucose 70, Bun 31, Cr 6.81, GFR 8, , Alb 1.5, globulin 5.8, SAA.3, Troponin 0.18, bilirubin 18, lipase 40, PT 29.2, INR 2.7, EKG - sinus, negative influenza A/B, urinalysis - positive Leukocyte esterase, nitrites, bilirubin. Microscopy - 4+ bacteria, WBCs tntc. CXR - no acute CP abn, CT head w/o contrast - without acute abnormality. Review of prior records revealed she was supposed to have a percutaneous gallbladder drain placed on 19. Followed by nephrology. HD MWF. GE -  hyperbili and elevated ALP, metabolic encephalopathy, Cardiology - CHF, Radiology - paracentesis. No HD was performed yesterday by nephro due to concerns of hypotension. GE - MRCP without contrast to rule out biliary obstruction (Will need sedation for this, please try again tomorrow). Transjugular liver biopsy with hepatic vein pressure measurements (will need sedation for this, will get this on Thursday if MRCP shows no biliary ductal obstruction). IV albumin replacement. Continue Ceftriaxone. Patient currently in [address] [place] [address]. Hgb trending down now at 7.2, HCT 21.1. Platelets dropping now at 58. Current Facility-Administered Medications:  
  LORazepam (ATIVAN) tablet 0.5 mg, 0.5 mg, Oral, Q12H PRN, Alia Purdy MD, 0.5 mg at 19 1225 
  midodrine (PROAMITINE) tablet 5 mg, 5 mg, Oral, Q8H, Anuel Zimmerman MD, 5 mg at 19 0921 
  rifAXIMin (XIFAXAN) tablet 550 mg, 550 mg, Oral, BID, Huan Barrett MD, 550 mg at 19 0921 
  lactulose (CHRONULAC) solution 10 g, 15 mL, Oral, TID, Huan Barrett MD, 10 g at 19 7904   cefTRIAXone (ROCEPHIN) 1 g in 0.9% sodium chloride (MBP/ADV) 50 mL MBP, 1 g, IntraVENous, Q24H, Dipak Villalobos MD, Last Rate: 100 mL/hr at 19 1710, 1 g at 19 1710   B complex-vitamin C-folic acid (NEPHRO-NACHO) 0.8 mg tab, 1 Tab, Oral, DAILY, Peggy Best MD, 1 Tab at 02/13/19 9879   sevelamer carbonate (RENVELA) tab 800 mg, 800 mg, Oral, TID WITH MEALS, Dipak Villalobos MD, 800 mg at 02/13/19 1691 
  arformoterol (BROVANA) neb solution 15 mcg, 15 mcg, Nebulization, BID RT, 15 mcg at 02/13/19 0823 **AND** budesonide (PULMICORT) 500 mcg/2 ml nebulizer suspension, 500 mcg, Nebulization, BID RT, Peggy Best MD, 500 mcg at 02/13/19 1231 Visit Vitals BP 94/53 Pulse 77 Temp 97.4 °F (36.3 °C) Resp 16 Ht 5' 5\" (1.651 m) Wt 55.5 kg (122 lb 5.7 oz) SpO2 96% BMI 20.36 kg/m² Labs: CBC WITH AUTOMATED DIFF Collection Time: 02/13/19  6:00 AM  
Result Value Ref Range WBC 12.1 4.6 - 13.2 K/uL  
 RBC 2.77 (L) 4.20 - 5.30 M/uL HGB 7.2 (L) 12.0 - 16.0 g/dL HCT 21.2 (L) 35.0 - 45.0 % MCV 76.5 74.0 - 97.0 FL  
 MCH 26.0 24.0 - 34.0 PG  
 MCHC 34.0 31.0 - 37.0 g/dL RDW 24.5 (H) 11.6 - 14.5 % PLATELET 58 (L) 360 - 420 K/uL NEUTROPHILS 87 (H) 40 - 73 % LYMPHOCYTES 7 (L) 21 - 52 % MONOCYTES 6 3 - 10 % EOSINOPHILS 0 0 - 5 % BASOPHILS 0 0 - 2 %  
 ABS. NEUTROPHILS 10.5 (H) 1.8 - 8.0 K/UL  
 ABS. LYMPHOCYTES 0.9 0.9 - 3.6 K/UL  
 ABS. MONOCYTES 0.7 0.05 - 1.2 K/UL  
 ABS. EOSINOPHILS 0.0 0.0 - 0.4 K/UL  
 ABS. BASOPHILS 0.0 0.0 - 0.1 K/UL  
 DF AUTOMATED    
 
CMP:  
   
METABOLIC PANEL, COMPREHENSIVE Collection Time: 02/13/19  6:00 AM  
Result Value Ref Range Sodium 136 136 - 145 mmol/L Potassium 3.9 3.5 - 5.5 mmol/L Chloride 95 (L) 100 - 108 mmol/L  
 CO2 24 21 - 32 mmol/L Anion gap 17 3.0 - 18 mmol/L Glucose 73 (L) 74 - 99 mg/dL BUN 38 (H) 7.0 - 18 MG/DL Creatinine 6.50 (H) 0.6 - 1.3 MG/DL  
 BUN/Creatinine ratio 6 (L) 12 - 20 GFR est AA 8 (L) >60 ml/min/1.73m2 GFR est non-AA 7 (L) >60 ml/min/1.73m2 Calcium 7.6 (L) 8.5 - 10.1 MG/DL  Bilirubin, total 17.7 (H) 0.2 - 1.0 MG/DL  
 ALT (SGPT) 28 13 - 56 U/L  
 AST (SGOT) 34 15 - 37 U/L Alk. phosphatase 540 (H) 45 - 117 U/L Protein, total 5.7 (L) 6.4 - 8.2 g/dL Albumin 2.4 (L) 3.4 - 5.0 g/dL Globulin 3.3 2.0 - 4.0 g/dL A-G Ratio 0.7 (L) 0.8 - 1.7 Ascites fluid cx (2/11) - negative. Urine cx (2/8) - negative. Hepatitis A IGM - negative. Body Fluids (2/10):  
    Pleural:      Compare to serum albumin.  
                     Serum Albumin - Fluid Albumin; value >1.2 g/dL  
                           favors transudate.  
                     Use with Light criteria.  
    Peritoneal:  Compare to serum albumin.  
                     SAAG (Serum ascites-albumin gradient)  
                     Serum Albumin - Fluid Albumin; value >1.1 g/dL  
                           favors transudate (e.g. CHF, cirrhosis)  
    Pericardial: No reference range available for this test.  
                     Serum and fluid total protein and LDH ratios                            (Light criteria) are recommended. Imaging:  
 
ECHO (2/11) -Left ventricular severely decreased systolic function. Estimated left ventricular ejection fraction is 21 - 25%. Left ventricular cavity size is dilated. Moderate (grade 2) left ventricular diastolic dysfunction. · Right ventricular cavity size is mildly dilated. Right ventricular global systolic function is reduced. Abnormal right ventricular septal motion. Diastolic flattening of interventricular septum consistent with right ventricle volume overload. · Right atrial cavity size is mildly dilated. · Mitral valve non-specific thickening. Mild mitral valve stenosis. · Non-specific thickening of the tricuspid valve. Moderate tricuspid valve regurgitation is present. · Severely elevated central venous pressure (15+ mmHg); IVC diameter is larger than 21 mm and collapses less than 50% with respiration. EKG (2/8) - Normal sinus rhythm Possible Left atrial enlargement Poor data quality, interpretation may be adversely affected Prolonged QT Abnormal ECG When compared with ECG of 08-FEB-2019 07:38, No significant change was found Normal sinus rhythm Possible Left atrial enlargement Poor data quality, interpretation may be adversely affected Prolonged QT Abnormal ECG When compared with ECG of 08-FEB-2019 07:38, No significant change was found CT-head w/o contrast (2/11) -  
1. No acute intracranial abnormalities are identified. No CT evidence to 
suggest acute intracranial hematoma, cortical infarct, or mass effect/mass 
lesion. 2. 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 
  
CT- abd/pelvis (2/9) -  
1. Hepatomegaly without a focal liver lesion appreciated on this noncontrast exam. 
2. Gallstones, no convincing signs of cholecystitis. 3. No biliary dilatation. 4. Wedge-shaped low densities in the spleen, probably small splenic infarct. 5. Moderate volume ascites. Anasarca. 6. Mildly atrophic kidneys bilaterally. 7. Diffuse calcific atherosclerosis. Assessment/Plan: 1. Spontaneous bacterial peritonitis - continue ceftriaxone. Basis of dx? Neutrophils remain below 250 in ascitic fluid. No bacteria have been isolated. 2.  ESRD on dialysis - continue HD as tolerated by BP. Add midodrine to support BP. 3. Cholestatic jaundice - GI consult - appreciate assistance - serologies, paracentesis to eval fluid, start lactulose/rifaximin, vit K x3 days, MRCP w/o contrast, transjugular liver bx 
4. UTI - Treated with ceftriaxone x 5 days. Urine cx - negative. 5. Metabolic encephalopathy - Head CT - no acute abnormality, monitor neuro status, improving. 6. HF - cardio consult - appreciate assistance 7. COPD - brovana, pulmicort, O2 stable on RA 
  
*ATTENTION:  This note has been created by a medical student for educational purposes only.   Please do not refer to the content of this note for clinical decision-making, billing, or other purposes. Please see attending physicians note to obtain clinical information on this patient. *

## 2019-02-13 NOTE — PROGRESS NOTES
Dtr number is [id number] . I updated her on liver (synthetic and colestatic problem-not necessarily florid liver failure), renal failure. I also told her she is on pressors to artificially  Bring up her blood pressure. I let her know the EUS was pretty normal but 2/2 anesthesia she had to be intubated and is now on a ventilator. Dtr expressed she understood and wants her Full code.

## 2019-02-13 NOTE — PROGRESS NOTES
Day 2 of attempting OT evaluation. Pt continues with AMS. 0818: attempted evaluation; pt restless, does not respond to name, no command following. Not appropriate for formal evaluation. Will discontinue OT orders at this time. Lillie Aggarwal MS OTR/L Office Ext: [phone] Pager: [phone]

## 2019-02-13 NOTE — PROGRESS NOTES
Problem: Pressure Injury - Risk of 
Goal: *Prevention of pressure injury Document Honorio Scale and appropriate interventions in the flowsheet. Outcome: Progressing Towards Goal 
Pressure Injury Interventions: 
Sensory Interventions: Assess changes in LOC, Check visual cues for pain, Keep linens dry and wrinkle-free Moisture Interventions: Absorbent underpads, Check for incontinence Q2 hours and as needed Activity Interventions: Increase time out of bed, Pressure redistribution bed/mattress(bed type), PT/OT evaluation Mobility Interventions: HOB 30 degrees or less, Pressure redistribution bed/mattress (bed type), PT/OT evaluation Nutrition Interventions: Document food/fluid/supplement intake Friction and Shear Interventions: HOB 30 degrees or less Problem: Falls - Risk of 
Goal: *Absence of Falls Document [place] Fall Risk and appropriate interventions in the flowsheet. Outcome: Progressing Towards Goal 
Fall Risk Interventions: 
Mobility Interventions: Communicate number of staff needed for ambulation/transfer Mentation Interventions: Adequate sleep, hydration, pain control, Bed/chair exit alarm Medication Interventions: Bed/chair exit alarm Elimination Interventions: Bed/chair exit alarm, Call light in reach History of Falls Interventions: Bed/chair exit alarm, Door open when patient unattended, Evaluate medications/consider consulting pharmacy

## 2019-02-13 NOTE — PROGRESS NOTES
PROGRESS NOTE PATIENT:  Vera Weston MRN: [id number] [facility]/[address], 3024/01 
         2/12/2019, 7:52 PM 
   
 
 
SUBJECTIVE: 
 
Remains lethargic and poorly responsive. Unable to stay still during the MRCP. OBJECTIVE: 
Patient Vitals for the past 24 hrs: 
 Temp Pulse Resp BP SpO2  
02/12/19 1911 97.6 °F (36.4 °C) 64 18 93/48 98 % 02/12/19 1559 97.9 °F (36.6 °C) 61 18 (!) 82/37 100 % 02/12/19 1039 94.9 °F (34.9 °C) (!) 56 16 92/48 96 % 02/12/19 0851     98 % 02/12/19 0803 (!) 94.2 °F (34.6 °C) (!) 58 16 (!) 89/43 94 % 02/12/19 0410 97 °F (36.1 °C) 60 16 95/45 95 % 02/12/19 0320    (!) 172/145   
02/12/19 0310    (!) 81/44   
02/12/19 0124    (!) 89/45   
02/12/19 0107 97.4 °F (36.3 °C) (!) 57 16 (!) 88/45 96 % 02/11/19 2144 97.6 °F (36.4 °C) (!) 57 17 91/42 94 % 02/11/19 2118     99 % Intake/Output Summary (Last 24 hours) at 2/12/2019 1952 Last data filed at 2/12/2019 5042 Gross per 24 hour Intake 250 ml Output  Net 250 ml Gen: NAD Heent: No pallor, icterus Lungs: Clear B/L  
CVS exam: Regular rate and rhythm Abd  : Distended with ascites with mild diffuse tenderness Labs: Results:  
Chemistry Recent Labs  
  02/12/19 
0600 02/11/19 
0540 02/10/19 
0431 GLU 84 63* 56*  137 137  
K 4.0 3.9 4.1 CL 95* 96* 95* CO2 28 28 27 BUN 34* 29* 21* CREA 5.86* 5.22* 4.35* CA 7.5* 7.9* 8.7 AGAP 13 13 15 BUCR 6* 6* 5* *  --   --   
TP 5.3*  --   --   
ALB 1.3* 1.3* 1.4*  
GLOB 4.0  --   --   
AGRAT 0.3*  --   --   
 Estimated Creatinine Clearance: 10 mL/min (A) (based on SCr of 5.86 mg/dL (H)). CBC w/Diff Recent Labs  
  02/12/19 
0600 02/11/19 
0540 02/10/19 
0431 WBC 11.6 9.5 10.6 RBC 3.13* 3.47* 3.88* HGB 8.2* 8.9* 10.1* HCT 24.4* 26.7* 30.7* PLT 74* 103* 105* GRANS 87* 83* 85* LYMPH 9* 11* 10* EOS 0 0 1 Cardiac Enzymes No results for input(s): CPK, CKND1, NOEL in the last 72 hours. No lab exists for component: Huan Brighter Coagulation Recent Labs  
  02/12/19 
0600 02/11/19 
0540 PTP 18.3* 19.5* INR 1.5* 1.7* Hepatitis Panel Lab Results Component Value Date/Time Hepatitis A, IgM NEGATIVE  02/12/2019 06:00 AM  
 Hepatitis B surface Ag <0.10 02/08/2019 06:05 AM  
 Hepatitis B surface Ab >1,000.00 02/08/2019 06:05 AM  
  
Amylase Lipase Liver Enzymes Recent Labs  
  02/12/19 
0600 02/11/19 
0540 02/10/19 
0431 TP 5.3*  --   --   
ALB 1.3* 1.3* 1.4* TBILI 17.4*  --   --   
*  --   --   
SGOT 43*  --   --   
ALT 31  --   -- Thyroid Studies No results for input(s): T4, T3U, TSH, TSHEXT in the last 72 hours. No lab exists for component: T3RU Pathology pathology No Known Allergies Current Facility-Administered Medications Medication Dose Route Frequency  LORazepam (ATIVAN) tablet 0.5 mg  0.5 mg Oral Q12H PRN  
 midodrine (PROAMITINE) tablet 5 mg  5 mg Oral Q8H  
 rifAXIMin (XIFAXAN) tablet 550 mg  550 mg Oral BID  lactulose (CHRONULAC) solution 10 g  15 mL Oral TID  phytonadione (VITAMIN K) 1 mg/mL oral solution 5 mg  5 mg Oral DAILY  cefTRIAXone (ROCEPHIN) 1 g in 0.9% sodium chloride (MBP/ADV) 50 mL MBP  1 g IntraVENous Q24H  B complex-vitamin C-folic acid (NEPHRO-NACHO) 0.8 mg tab  1 Tab Oral DAILY  sevelamer carbonate (RENVELA) tab 800 mg  800 mg Oral TID WITH MEALS  
 arformoterol (BROVANA) neb solution 15 mcg  15 mcg Nebulization BID RT And  
 budesonide (PULMICORT) 500 mcg/2 ml nebulizer suspension  500 mcg Nebulization BID RT  
 
 
ASSESSMENT: 
 
Cholestatic jaundice. Ascites with cell count profile consistent with bacterial peritonitis, SAAG<1.1. Cholelithiasis. Profound hypoalbuminemia 
  
 MRCP without contrast to rule out biliary obstruction (Will need sedation for this, please try again tomorrow) Transjugular liver biopsy with hepatic vein pressure measurements (will need sedation for this, will get this on Thursday if MRCP shows no biliary ductal obstruction). IV albumin.  
Continue Rocephin.  
  
Alexandru Peguero MD

## 2019-02-13 NOTE — PERIOP NOTES
Pt transported to phase 2 with Dedra TOSCANO and [facility]. Pt connected to monitor and stable.  Report given to

## 2019-02-13 NOTE — PROGRESS NOTES
1600 Called to 2600 to assist Dr. Jarvis Chaparro with intubation. 1615 Patient intubated for AMS with size 7.5 ETT and is 20 at the lip. Vent settings started at ACVC+ 14 / 400 / +5 / 35%. Plan: Remain on current vent settings and orders for ABG 30-60 mins s/p intubation.

## 2019-02-13 NOTE — PERIOP NOTES
PACU Summary Patient arrived to PACU at  Bedside/Verbal report received from Uriah Ramírez RN Vitals:  
 02/13/19 0823 02/13/19 1323 02/13/19 1328 02/13/19 1329 BP:  (!) 79/42 (!) 80/44 (!) 79/42 Pulse:   65 64 Resp:   15 18 Temp:    97.9 °F (36.6 °C) TempSrc:      
SpO2: 96% 100% 100% 100% Weight:      
Height:      
 
 
Patient brought to PACU for hypotension. 1325 spoke to Dr. Nissa Smith stopped, suggested pressors and attempt at dialysis once stable 1336 paged Dr. Maricruz Meng, order received for levophed and transfer to icu, no further labs at this time. 1347 BP cuff changed to a small cuff and second IV started Cardiac rhythm: Normal Sinus Rhythm Lines and Drains Peripheral Intravenous Line:  
Peripheral IV Anterior;Proximal;Right Forearm (Active) Site Assessment Clean, dry, & intact 2/13/2019  7:05 AM  
Phlebitis Assessment 0 2/13/2019  7:05 AM  
Infiltration Assessment 0 2/13/2019  7:05 AM  
Dressing Status Clean, dry, & intact 2/13/2019  7:05 AM  
Dressing Type Tape;Transparent 2/13/2019  7:05 AM  
Hub Color/Line Status Pink;Flushed;Capped 2/13/2019  7:05 AM  
Action Taken Open ports on tubing capped 2/13/2019  7:05 AM  
Alcohol Cap Used Yes 2/13/2019  7:05 AM  
 and Hemodialysis Catheter:  
Hemodialysis Access (Active) Central Line Being Utilized Yes 2/13/2019  7:05 AM  
Criteria for Appropriate Use Dialysis/apheresis 2/13/2019  7:05 AM  
Date Accessed  02/09/19 2/13/2019  7:05 AM  
Site Assessment Clean, dry, & intact 2/13/2019  7:05 AM  
Date of Last Dressing Change 02/09/19 2/13/2019  7:05 AM  
Dressing Status Clean, dry, & intact 2/13/2019  7:05 AM  
Dressing Type Gauze;Tape 2/13/2019  7:05 AM  
 
 
Wound Intake and Output Intake/Output Summary (Last 24 hours) at 2/13/2019 1343 Last data filed at 2/13/2019 1322 Gross per 24 hour Intake 50 ml Output  Net 50 ml Report called to RUPINDER Reardon in 3561 at 5605 Patient transported to [facility] 82 [phone] at [phone] Vincenzo Machado RN

## 2019-02-13 NOTE — PROCEDURES
Henna Guzman [facility]                  Pulmonary, Critical Care, and Sleep Medicine     [facility] Procedure with Ultrasound Guidance    Indication: Need for vasopressors    Risks, benefits, alternatives explained and consent obtained. Patient positioned in Trendelenburg. Timeout called with RN and confirmed patient ID, procedure, site, allergy, consent etc.     Central line Bundle:  Full sterile barrier precautions used. 7-Step Sterility Protocol followed. (cap, mask sterile gown, sterile gloves, large sterile sheet, hand hygiene, 2% chlorhexidine for cutaneous antisepsis)  5 mL 1% Lidocaine placed at insertion site. Using ultrasound guidance,   Right internal jugular cannulated x 1 attempt(s) utilizing the modified Seldinger technique. no difficulty. Position of wire confirmed in vein using US before dilating. Wire was removed. Good blood return. Catheter secured & Biopatch applied. Sterile Tegaderm placed. Ultrasound used to rule out pneumothorax. CXR pending.       Deuce Grace MD   2/13/2019 5:33 PM

## 2019-02-13 NOTE — PROGRESS NOTES
Internal Medicine Progress Note Patient's Name: Carlos Bob Admit Date: 2/8/2019 Length of Stay: 5 Assessment/Plan Principal Problem: 
  SBP (spontaneous bacterial peritonitis) ([address] (2/12/2019) Active Problems: 
  ESRD (end stage renal disease) on dialysis ([address] (2/8/2019) Cholestatic jaundice (2/12/2019) UTI (urinary tract infection) (2/8/2019) Metabolic encephalopathy (3/0/7235) Cirrhosis ([address] (2/8/2019) COPD (chronic obstructive pulmonary disease) ([address] (2/8/2019) Hyperbilirubinemia (2/8/2019) Combined systolic and diastolic heart failure ([address] (2/10/2019) Hypotension (2/13/2019) SBP 
- cont rocephin 
- paracentesis done 2/11, cell count profile consistent with bacterial peritonitis ESRD on dialysis 
- nephro consult - appreciate assistance 
- HD on 2/9 
- hold dialysis on 2/12 d/t HOTN - midodrine added 
- dialysis done 2/13, albumin given, no fluid pulled off Cholestatic jaundice 
- CT abd results below - GI consult - appreciate assistance - serologies, paracentesis to eval fluid, start lactulose/rifaximin, vit K x3 days - MRCP w/o contrast, transjugular liver bx ordered - ERCP done 2/13 - results below, GI recs medical management of decompensated chronic liver disease with cholestasis UTI 
- iv rocephin x5 days - UCx - negative 
  
Metabolic encephalopathy 
- Head CT - no acute abnormality 
- monitor neuro status - worsened on 2/13 likely secondary to anesthesia 
  
HF 
- cardio consult - appreciate assistance COPD 
- brovana, pulmicort 
- O2 stable on RA 
 
 
- PT recommending SNF 
- Cont acceptable home medications for chronic conditions  
- DVT protocol I have personally reviewed all pertinent labs and films that have officially resulted over the last 24 hours. I have personally checked for all pending labs that are awaiting final results. Interval History Concha Valencia is a 46 y.o. female with a PMHx ESRD on dialysis, DM, CAD, COPD, syst/diastolic heart failure, HTN, chronic liver disease who presented to the ED from dialysis due to AMS. History limited 2/2 AMS. ED eval revealed scleral icterus, confusion, leukocytosis, UTI. Hyperbilirubinemia (21.7) and elevated alk phos (571) are similar to recent ED visit at [facility] on 2/1. Review of prior records from [facility] revealed she was supposed to have a percutaneous gallbladder drain placed on 2/4/19. Head CT neg. Pt will be admitted for further evaluation. IV rocephin started. Nephrology consulted - HD on 2/9 as patient missed the prior day. CT abd without contrast ordered, results below. GI consulted - serologies, paracentesis to eval fluid, start lactulose/rifaximin, vit K x3 days, cardio consult. UCx negative. PT recommending SNF. Paracentesis performed 2/11 - removed 100cc dark jed fluid, cell count profile consistent with SBP. Pt already on rocephin. Fluid cx ngtd. Midodrine added for HOTN. MRCP w/o contrast and transjugular liver bx ordered per GI. MRCP unable to be done 2/2 patient not cooperating. ERCP done on 2/13 instead, no CBD stone or stricture seen. Patient transferred to ICU afterwards 2/2 HOTN and need for pressors instead of IVF due to ESRD. ICU team concerned about AMS and pt's ability to protect airway so patient was intubated. Pt's daughter informed of recent events, wishes for patient to remain full code. Subjective Pt s/e @ bedside. Pt on ventilator. Objective Visit Vitals BP 91/53 Pulse 65 Temp 97.9 °F (36.6 °C) Resp 16 Ht 5' 5\" (1.651 m) Wt 55.5 kg (122 lb 5.7 oz) SpO2 99% BMI 20.36 kg/m² Physical Exam: 
General Appearance: NAD, ventilated HEENT: normocephalic/atraumatic, moist mucus membranes, scleral icterus Neck: No JVD, supple Lungs: CTA with normal respiratory effort CV: RRR, no m/r/g Abdomen: soft, non-tender, normal bowel sounds Extremities: no cyanosis, no peripheral edema Neuro: non-focal 
 
 
Intake and Output: 
Current Shift:  02/13 0701 - 02/13 1900 In: 48 [I.V.:50] Out: - Last three shifts:  02/11 1901 - 02/13 0700 In: 250 [I.V.:250] Out: -  
 
Lab/Data Reviewed: 
Procedure: EGD and EUS limited to pancreaticobiliary system. 
  
IMPRESSION:  
1. No esophageal or gastric varices. 2.   Multiple small duodenal ulcers. 3.   Gastritis. 4.   Normal EUS of the pancreas and biliary system. No CBD stone or stricture. 5.   Moderate perihepatic ascites. 
  
RECOMMENDATIONS: 
1. Resume prior diet and medications. 2.   Medical management of her decompensated chronic liver disease with cholestasis. BMP:  
Lab Results Component Value Date/Time  02/13/2019 06:00 AM  
 K 3.9 02/13/2019 06:00 AM  
 CL 95 (L) 02/13/2019 06:00 AM  
 CO2 24 02/13/2019 06:00 AM  
 AGAP 17 02/13/2019 06:00 AM  
 GLU 73 (L) 02/13/2019 06:00 AM  
 BUN 38 (H) 02/13/2019 06:00 AM  
 CREA 6.50 (H) 02/13/2019 06:00 AM  
 GFRAA 8 (L) 02/13/2019 06:00 AM  
 GFRNA 7 (L) 02/13/2019 06:00 AM  
 
CBC:  
Lab Results Component Value Date/Time WBC 12.1 02/13/2019 06:00 AM  
 HGB 7.2 (L) 02/13/2019 06:00 AM  
 HCT 21.2 (L) 02/13/2019 06:00 AM  
 PLT 58 (L) 02/13/2019 06:00 AM  
 
 
Imaging Reviewed: 
No results found. Medications Reviewed: 
Current Facility-Administered Medications Medication Dose Route Frequency  lidocaine (XYLOCAINE) 10 mg/mL (1 %) injection  epoetin briseida (EPOGEN;PROCRIT) injection 10,000 Units  10,000 Units IntraVENous DIALYSIS MON, WED & FRI  dexmedeTOMidine (PRECEDEX) 400 mcg in 0.9% sodium chloride 100 mL infusion  0.2-0.7 mcg/kg/hr IntraVENous TITRATE  albuterol-ipratropium (DUO-NEB) 2.5 MG-0.5 MG/3 ML  3 mL Nebulization Q4H RT  
 NOREPINephrine (LEVOPHED) 8 mg in 0.9% NS 250ml infusion  2-16 mcg/min IntraVENous TITRATE  LORazepam (ATIVAN) tablet 0.5 mg  0.5 mg Oral Q12H PRN  
  midodrine (PROAMITINE) tablet 5 mg  5 mg Oral Q8H  
 rifAXIMin (XIFAXAN) tablet 550 mg  550 mg Oral BID  lactulose (CHRONULAC) solution 10 g  15 mL Oral TID  cefTRIAXone (ROCEPHIN) 1 g in 0.9% sodium chloride (MBP/ADV) 50 mL MBP  1 g IntraVENous Q24H  B complex-vitamin C-folic acid (NEPHRO-NACHO) 0.8 mg tab  1 Tab Oral DAILY  arformoterol (BROVANA) neb solution 15 mcg  15 mcg Nebulization BID RT And  
 budesonide (PULMICORT) 500 mcg/2 ml nebulizer suspension  500 mcg Nebulization BID RT Mike Cornejo PA-C [facility] Office:  [phone] Pager: [phone]

## 2019-02-13 NOTE — PROGRESS NOTES
1925  Received bedside verbal shift report from [facility]. Pt lying in bed resting comfortably. Piv # 22 to right arm intact. Call bell within reach, side rails up x 3, bed low and locked. No complaints offered, pt instructed to call for assistance. Bed alarm in place. 0021  reassessment completed. Call bell within reach bed low and locked bed alarm in place. 0235 Repeat bp after 2nd 25 gm of albumin 64/53. Call to hospitalist made aware new order for  ml bolus now. Bedside and Verbal shift change report given to Dorothy BUCIO (oncoming nurse) by Bree Cortez (offgoing nurse). Report included the following information SBAR, Kardex, Intake/Output, MAR and Recent Results.

## 2019-02-13 NOTE — PROCEDURES
[facility]  Pulmonary, Critical Care, and Sleep Medicine    Name: Donis Tobar MRN: [id number]   : 1970 Hospital: [address] [place] [address]   Date: 2019        Intubation Note    Procedure: elective/emergent intubation    Indication: respiratory insufficiency    Anesthesia- Rapid sequence:  Fentanyl 50mcg(1.5mcg/kg), Etomidate 10mg(0.5mg'kg) After assessing the airway, the patient underwent preoxygenation with 100% FiO2 for 5 min. Fentanyl was then given and after 3 min, etomidate and succnylcholine was given sequentially in rapid IV push. The Sellick maneuver was performed throughout the entire sequence. After adequate sedation and paralisys emergent intubation was performed. A 4.0 glidoscope was used to visualize the epiglottis and vocal chords. After positive identification of the vocal cords, an 7.5  ET tube was placed into the trachea with fiberoptic visualization. The tube was seen passing through the vocal chords without / with some difficulty. CO2 colorimetry was employed immediately to verify tube in airway with /without appropriate color change indicating detection of CO2. Water vapor was seen within the ET tube, and auscultation of the abdomen revealed no bubbling souds. Auscultation  and inspection of the chest after intubation showed symmetric chest excursion and symmetric air entry bilaterally. Chest X-ray has been ordered and is pending. The patient has been placed on a mechanical ventilator. There were no complications.     rIma Kemp MD

## 2019-02-13 NOTE — CONSULTS
[facility]  Pulmonary, Critical Care, and Sleep Medicine    Name: Jude Braun MRN: [id number]  : 1970 Hospital: [address] [place] [address]  Date: 2019       [facility] Initial Patient Consult                                              Consult requesting physician: Ramila Jane MD    Reason for Consult: ICU management    I have reviewed the flowsheet and notes. Events, vitals, medications and notes from last 24 hours reviewed. Care plan discussed with staff    History of Present Illness:    Jude Braun has been seen and evaluated in ICU/ER. Patient is a 46 y.o. female with PMHx significant for ESRD on HD, COPD, CHF and chronic liver disease who presented to the ED for altered mental status on . Apparently she was unresponsive this AM, went for a MRCP, then became hypotension and was sent to the ICU for levophed. History is limited to chart review as I was not informed by any physician about the status or transfer of the patient. She came to ED with a bilirubin of 17 and confused. She had a paracentesis on  that was positive for SBT. Her BP remained too low throughout the admission to receive HD. An echocardiogram showed an EF of 20-25% and RV overload. On my assessment, the patient is unresponsive. She has w/d to pain, does not open her eyes, and is nonverbal. BPs with MAPs in the 50s. HR and saturations were normal.     Family was attempted to be contacted but did not answer. I urgently intubated the patient and placed a central line.      Patient Active Problem List   Diagnosis Code    Metabolic encephalopathy F17.05    UTI (urinary tract infection) N39.0    Cirrhosis ([facility] [address] K74.60    ESRD (end stage renal disease) on dialysis (HCC) N18.6, Z99.2    COPD (chronic obstructive pulmonary disease) ([facility]) J44.9    Hyperbilirubinemia E80.6    Combined systolic and diastolic heart failure ([facility]) I50.40    Cholestatic jaundice R17    SBP (spontaneous bacterial peritonitis) ([facility] [address] K65.2    Hypotension I95.9       Assessment:  Acute Respiratory Failure  - Intubated 2/13 due to very poor mental status  Shock  - Likely 2/2 ESLD, sepsis from SBT  - currently on levophed  End Stage Liver Disease  - passive congestion per GI 2/2 CHF  - EGD with EUS negative  Spontaneous Bacterial Peritonitis  - s/p paracentesis 2/9  - on ceftriaxone  ESRD  - on HD, unable to perform since 1/9  Encephalopathy  - likely 2/2 end stage liver disease  - on rifaxamin  CHF  - EF 20% on recent echo  - negative stress test in 10/2018  UTI  COPD    Impression/Plan:  - Multiple organ failure- CHF, CKD, and ESLD with severe encephalopathy- overall this would indicate a very poor prognosis  - Ventilator bundle ordered  - ABG WNL  - Awaiting CXR  - Levophed to keep MAP >60  - Precedex for sedation  - Place OG tube  - Rifaximin and ceftriaxone, f/u cultures  - Add nebulizers, brovana and pulmicort  - Further w/u per GI     Family arrived and the patient's status was discussed with them. Code status: Full Code    OTHER:  Glycemic Control. Glucose stabilizer per ICU protocol when on insulin drip. Maintain blood glucose 140-180. Replace electrolytes per ICU electrolyte replacement protocol    Ventilator bundle & Sedation protocol followed. Daily morning sedation holiday, assessment for readiness for SBT & weaning from ventilator; and then re-titrate if required. Aim to keep peak plateau pressure 09-67BS H2O in ARDS patient. Yohana tube to suction at 20-30 cm H2O, Maintain Yohana tube with 5-10ml air every 4 hours. Chlorhexidine mouth washes and routine oral care every 4 hours. Stress ulcer and DVT prophylaxis. HOB >=30 degree elevation all the time. HOB >=30 degree elevation all the time. Aggressive pulmonary toileting. Incentive spirometry when appropriate. Aspiration precautions. Vasopressor when appropriate with MAP goal >65 mmHg. Central Line bundle followed, remove when not needed.  Large bore IV line or CVP when appropriate. Bay bundle followed, remove bay catheter when not critically ill. Skin & Wound care. PT/OT eval and treat. OOB/IS when appropriate. Further recommendations will be based on the patient's response to recommended treatment and results of the investigation ordered. Quality Care: Stress ulcer prophylaxis, DVT prophylaxis, HOB elevated, Infection control all reviewed and addressed. Events and notes from last 24 hours reviewed. Care plan discussed with nursing. D/w patient and family: above medical problems and answered all questions to their satisfaction. See my orders for detail. CC TIME: >65 min   Further management depending on test results and work up as outlined above. Review of Systems:  Review of systems not obtained due to patient factors. Medication Reviewed    No Known Allergies   Past Medical History:   Diagnosis Date    CAD (coronary artery disease)     RCA TAMMY (2015)    Cardiomyopathy ([facility] [address]     CHF (congestive heart failure) (HCC)     Chronic obstructive pulmonary disease (HCC)     Diabetes ([facility] [address]     ESRD on dialysis ([facility] [address]     HLD (hyperlipidemia)     Hypertension     Liver disease     S/P heart valve repair       Past Surgical History:   Procedure Laterality Date    CARDIAC SURG PROCEDURE UNLIST      CA GI ENDOSCOPIC ULTRASOUND  2/13/2019           Social History     Tobacco Use    Smoking status: Unknown If Ever Smoked   Substance Use Topics    Alcohol use: No     Frequency: Never      History reviewed. No pertinent family history. Prior to Admission medications    Medication Sig Start Date End Date Taking? Authorizing Provider   fluticasone-salmeterol (ADVAIR DISKUS) 250-50 mcg/dose diskus inhaler Take 1 Puff by inhalation every twelve (12) hours. Provider, Historical   ferric citrate (AURYXIA) 210 mg iron tablet Take  by mouth three (3) times daily (with meals).     Provider, Historical   calcitRIOL (ROCALTROL) 0.5 mcg capsule Take 0.5 mcg by mouth daily. Provider, Historical   carvedilol (COREG) 25 mg tablet Take 25 mg by mouth two (2) times daily (with meals). Provider, Historical   hydrALAZINE (APRESOLINE) 25 mg tablet Take 25 mg by mouth three (3) times daily. Provider, Historical   isosorbide mononitrate ER (IMDUR) 60 mg CR tablet Take 60 mg by mouth every morning. Provider, Historical   sevelamer (RENAGEL) 400 mg tablet Take 400 mg by mouth three (3) times daily (with meals). Provider, Historical   multivitamin with minerals (RENAL PO) Take  by mouth. Provider, Historical   sevelamer carbonate (RENVELA) 800 mg tab tab Take  by mouth three (3) times daily. Provider, Historical   cinacalcet (SENSIPAR) 30 mg tablet Take 30 mg by mouth daily. Provider, Historical   traZODone (DESYREL) 100 mg tablet Take 100 mg by mouth nightly. Provider, Historical     Current Facility-Administered Medications   Medication Dose Route Frequency    lidocaine (XYLOCAINE) 10 mg/mL (1 %) injection        epoetin briseida (EPOGEN;PROCRIT) injection 10,000 Units  10,000 Units IntraVENous DIALYSIS MON, WED & FRI    dexmedeTOMidine (PRECEDEX) 400 mcg in 0.9% sodium chloride 100 mL infusion  0.2-0.7 mcg/kg/hr IntraVENous TITRATE    albuterol-ipratropium (DUO-NEB) 2.5 MG-0.5 MG/3 ML  3 mL Nebulization Q4H RT    NOREPINephrine (LEVOPHED) 8 mg in 0.9% NS 250ml infusion  2-16 mcg/min IntraVENous TITRATE    midodrine (PROAMITINE) tablet 5 mg  5 mg Oral Q8H    rifAXIMin (XIFAXAN) tablet 550 mg  550 mg Oral BID    lactulose (CHRONULAC) solution 10 g  15 mL Oral TID    cefTRIAXone (ROCEPHIN) 1 g in 0.9% sodium chloride (MBP/ADV) 50 mL MBP  1 g IntraVENous Q24H    B complex-vitamin C-folic acid (NEPHRO-NACHO) 0.8 mg tab  1 Tab Oral DAILY    arformoterol (BROVANA) neb solution 15 mcg  15 mcg Nebulization BID RT    And    budesonide (PULMICORT) 500 mcg/2 ml nebulizer suspension  500 mcg Nebulization BID RT       Lines:  All central lines examined by me. No signs of erythema, induration, discharge.    Feeding Tube:                        Peripherally Inserted Central Catheter:     Central Venous Catheter:     Peripheral Intravenous Line:  Peripheral IV Anterior;Proximal;Right Forearm (Active)   Site Assessment Clean, dry, & intact 2019  7:05 AM   Phlebitis Assessment 0 2019  7:05 AM   Infiltration Assessment 0 2019  7:05 AM   Dressing Status Clean, dry, & intact 2019  7:05 AM   Dressing Type Tape;Transparent 2019  7:05 AM   Hub Color/Line Status Pink;Flushed;Capped 2019  7:05 AM   Action Taken Open ports on tubing capped 2019  7:05 AM   Alcohol Cap Used Yes 2019  7:05 AM       Peripheral IV 19 Right Antecubital (Active)     Arterial Line:     Drain(s):     Airway:  Airway - Endotracheal Tube 19 Oral (Active)   Insertion Depth (cm) 20 cm 2019  4:30 PM   Line Héctor Lips 2019  4:30 PM   Side Secured Centered 2019  4:30 PM   Cuff Pressure 30 cmH20 2019  4:30 PM   Site Assessment Clean, dry, & intact 2019  4:30 PM       Objective:  Vital Signs:    Visit Vitals  BP 91/53   Pulse 61   Temp 97.9 °F (36.6 °C)   Resp 14   Ht 5' 5\" (1.651 m)   Wt 55.5 kg (122 lb 5.7 oz)   SpO2 100%   BMI 20.36 kg/m²      O2 Device: Room air     Temp (24hrs), Av.5 °F (36.4 °C), Min:97.3 °F (36.3 °C), Max:97.9 °F (36.6 °C)      Intake/Output:   Last shift:       0701 -  1900  In: 50 [I.V.:50]  Out: -       Intake/Output Summary (Last 24 hours) at 2019 5489  Last data filed at 2019 1322  Gross per 24 hour   Intake 50 ml   Output    Net 50 ml       Ventilator Settings:  Mode Rate Tidal Volume Pressure FiO2 PEEP  Assist control, VC+   400 ml    35 % 5 cm H20    Peak airway pressure: 24 cm H2O   Plateau pressure:    Tidal volume:   Minute ventilation: 6.3 l/min  SPO2     ARDS network Guidelines: Lung protective strategy and Plateau pressure goals less than or equal to 30      Physical Exam:   General/Neurology: Unresponsive, some grimace, w/d to pain  Head:   Normocephalic, without obvious abnormality  Eye:   PERRL, EOM intact, severe scleral icterus  Oral:   Mucus membranes dry  Neck:   Supple  Lung:   B/l air entry is fair  Heart:   Regular rate & rhythm. S1 S2 present. Abdomen: Tense abdomen, fluid wave, grimace to palpation  Extremities:  + pedal edema  Skin:   Dry, intact    Data:      Recent Results (from the past 24 hour(s))   PROTHROMBIN TIME + INR    Collection Time: 02/13/19  6:00 AM   Result Value Ref Range    Prothrombin time 18.8 (H) 11.5 - 15.2 sec    INR 1.6 (H) 0.8 - 1.2     CBC WITH AUTOMATED DIFF    Collection Time: 02/13/19  6:00 AM   Result Value Ref Range    WBC 12.1 4.6 - 13.2 K/uL    RBC 2.77 (L) 4.20 - 5.30 M/uL    HGB 7.2 (L) 12.0 - 16.0 g/dL    HCT 21.2 (L) 35.0 - 45.0 %    MCV 76.5 74.0 - 97.0 FL    MCH 26.0 24.0 - 34.0 PG    MCHC 34.0 31.0 - 37.0 g/dL    RDW 24.5 (H) 11.6 - 14.5 %    PLATELET 58 (L) 005 - 420 K/uL    NEUTROPHILS 87 (H) 40 - 73 %    LYMPHOCYTES 7 (L) 21 - 52 %    MONOCYTES 6 3 - 10 %    EOSINOPHILS 0 0 - 5 %    BASOPHILS 0 0 - 2 %    ABS. NEUTROPHILS 10.5 (H) 1.8 - 8.0 K/UL    ABS. LYMPHOCYTES 0.9 0.9 - 3.6 K/UL    ABS. MONOCYTES 0.7 0.05 - 1.2 K/UL    ABS. EOSINOPHILS 0.0 0.0 - 0.4 K/UL    ABS.  BASOPHILS 0.0 0.0 - 0.1 K/UL    DF AUTOMATED     METABOLIC PANEL, COMPREHENSIVE    Collection Time: 02/13/19  6:00 AM   Result Value Ref Range    Sodium 136 136 - 145 mmol/L    Potassium 3.9 3.5 - 5.5 mmol/L    Chloride 95 (L) 100 - 108 mmol/L    CO2 24 21 - 32 mmol/L    Anion gap 17 3.0 - 18 mmol/L    Glucose 73 (L) 74 - 99 mg/dL    BUN 38 (H) 7.0 - 18 MG/DL    Creatinine 6.50 (H) 0.6 - 1.3 MG/DL    BUN/Creatinine ratio 6 (L) 12 - 20      GFR est AA 8 (L) >60 ml/min/1.73m2    GFR est non-AA 7 (L) >60 ml/min/1.73m2    Calcium 7.6 (L) 8.5 - 10.1 MG/DL    Bilirubin, total 17.7 (H) 0.2 - 1.0 MG/DL    ALT (SGPT) 28 13 - 56 U/L    AST (SGOT) 34 15 - 37 U/L    Alk. phosphatase 540 (H) 45 - 117 U/L    Protein, total 5.7 (L) 6.4 - 8.2 g/dL    Albumin 2.4 (L) 3.4 - 5.0 g/dL    Globulin 3.3 2.0 - 4.0 g/dL    A-G Ratio 0.7 (L) 0.8 - 1.7     HCG QL SERUM    Collection Time: 02/13/19 10:32 AM   Result Value Ref Range    HCG, Ql. NEGATIVE  NEG     POC G3    Collection Time: 02/13/19  5:00 PM   Result Value Ref Range    Device: VENT      FIO2 (POC) 0.35 %    pH (POC) 7.413 7.35 - 7.45      pCO2 (POC) 38.8 35.0 - 45.0 MMHG    pO2 (POC) 120 (H) 80 - 100 MMHG    HCO3 (POC) 24.9 22 - 26 MMOL/L    sO2 (POC) 99 (H) 92 - 97 %    Base excess (POC) 0 mmol/L    Mode ASSIST CONTROL      Tidal volume 400 ml    Set Rate 14 bpm    PEEP/CPAP (POC) 5 cmH2O    PIP (POC) 27      Allens test (POC) YES      Inspiratory Time 1.0 sec    Total resp. rate 14      Site LEFT RADIAL      Patient temp.  97.9      Specimen type (POC) ARTERIAL      Performed by Vikki Luna     Volume control plus YES           Chemistry   Recent Labs     02/13/19 0600 02/12/19  0600 02/11/19  0540   GLU 73* 84 63*    136 137   K 3.9 4.0 3.9   CL 95* 95* 96*   CO2 24 28 28   BUN 38* 34* 29*   CREA 6.50* 5.86* 5.22*   CA 7.6* 7.5* 7.9*   PHOS  --   --  4.9   AGAP 17 13 13   BUCR 6* 6* 6*   * 746*  --    TP 5.7* 5.3*  --    ALB 2.4* 1.3* 1.3*   GLOB 3.3 4.0  --    AGRAT 0.7* 0.3*  --        CBC w/Diff   Recent Labs     02/13/19 0600 02/12/19  0600 02/11/19  0540   WBC 12.1 11.6 9.5   RBC 2.77* 3.13* 3.47*   HGB 7.2* 8.2* 8.9*   HCT 21.2* 24.4* 26.7*   PLT 58* 74* 103*   GRANS 87* 87* 83*   LYMPH 7* 9* 11*   EOS 0 0 0       ABG    Recent Labs     02/13/19  1700   PHI 7.413   PCO2I 38.8   PO2I 120*   HCO3I 24.9   FIO2I 0.35       Micro     Recent Labs     02/11/19  1410   CULT NO GROWTH 2 DAYS     Recent Labs     02/11/19  1410   CULT NO GROWTH 2 DAYS       CT (Most Recent)    Results from Hospital Encounter encounter on 02/08/19   CT HEAD WO CONT    Narrative EXAM: CT head    CLINICAL INDICATION/HISTORY: Status post fall with trauma to head. COMPARISON: 2/8/2019. TECHNIQUE: Axial CT imaging of the head  was obtained from skull base to vertex  without intravenous contrast. Coronal and sagittal reconstructions were  obtained. One or more dose reduction techniques were used on this CT: automated  exposure control, adjustment of the mAs and/or kVp according to patient's size,  and iterative reconstruction techniques. The specific techniques utilized on  this CT exam have been documented in the patient's electronic medical record. Digital Imaging and Communications in Medicine (DICOM) format image data are  available to nonaffiliated external healthcare facilities or entities on a  secure, media free, reciprocally searchable basis with patient authorization for  at least a 12-month period after this study. _______________    FINDINGS:    BRAIN AND POSTERIOR FOSSA: The sulci, folia, ventricles and basal cisterns are  within normal limits for the patient?s age. There is no intracranial hemorrhage,  mass effect, or shift of midline structures. Chronic areas of infarct right  occipital lobe and left parietal lobe. No new cortical or white matter  hypodensity. Bilateral carotid siphon atherosclerosis. EXTRA-AXIAL SPACES AND MENINGES: There are no abnormal extra-axial fluid  collections. CALVARIUM: No acute osseous abnormality. SINUSES: The visualized portions of the paranasal sinuses and mastoid air cells  are well aerated. OTHER: None.    _______________      Impression IMPRESSION:    1. No acute intracranial abnormalities are identified. No CT evidence to  suggest acute intracranial hematoma, cortical infarct, or mass effect/mass  lesion. 2. 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 XR (Most Recent).  CXR reviewed by me and compared with previous CXR   Results from Hospital Encounter encounter on 02/08/19   XR CHEST PORT    Narrative Chest, single view    Indication: Altered mental status, confusion, jaundice    Comparison: None    Findings:  Portable upright AP view of the chest was obtained. No focal  pneumonic opacity, pneumothorax, or pleural effusion. Cardiac size is enlarged,  with prominence of the main pulmonary arterial shadow. No acute osseous  abnormality. Impression Impression:    1. Mildly underexpanded lungs and cardiac enlargement without superimposed acute  radiographic cardiopulmonary abnormality. 2. Prominence of the main pulmonary arterial shadow may reflect sequela from  pulmonary arterial hypertension. High complexity decision making was performed during the evaluation of this patient at high risk for decompensation with multiple organ involvement     Above mentioned total time spent on reviewing the case/medical record/data/notes/EMR/patient examination/documentation/coordinating care with nurse/consultants, exclusive of procedures with complex decision making performed and > 50% time spent in face to face evaluation.     Juani Butler MD  2/13/2019

## 2019-02-13 NOTE — PROGRESS NOTES
Problem: Falls - Risk of 
Goal: *Absence of Falls Document Jeffrey Dust Fall Risk and appropriate interventions in the flowsheet. Outcome: Progressing Towards Goal 
Fall Risk Interventions: 
Mobility Interventions: Communicate number of staff needed for ambulation/transfer Mentation Interventions: Adequate sleep, hydration, pain control, Bed/chair exit alarm, Door open when patient unattended, More frequent rounding, Reorient patient, Room close to nurse's station, Update white board Medication Interventions: Bed/chair exit alarm, Evaluate medications/consider consulting pharmacy Elimination Interventions: Call light in reach, Patient to call for help with toileting needs History of Falls Interventions: Bed/chair exit alarm, Door open when patient unattended, Room close to nurse's station

## 2019-02-13 NOTE — PROGRESS NOTES
Bedside shift change report given to Dorothy RN (oncoming nurse) by Leo Cameron RN (offgoing nurse). Report included the following information SBAR, Kardex, Intake/Output, MAR and Recent Results. 
   
8763 -- AM medications (Midodrine and Lactulose) given, well tolerated, other meds were spit out, tried to dissolve them, patient still refused them, will continue to monitor. 
   
1001 -- Valium given, well tolerated, patient going off the unit to ENDO. 
 
1340 -- Called from Rishabh Anne (supervisor), patient is going to ICU after procedure, please take belongings to ICU. 
 
[phone] -- Patient's belongings taken to [phone], given to Alexandra [facility].

## 2019-02-13 NOTE — PERIOP NOTES
TRANSFER - IN REPORT: 
 
Verbal report received from Dorothy RN(name) on Jared Griggs  being received from 3000(unit) for ordered procedure Report consisted of patients Situation, Background, Assessment and  
Recommendations(SBAR). Information from the following report(s) Pre Procedure Checklist was reviewed with the receiving nurse. She is presently attempting to get telephone consent. Opportunity for questions and clarification was provided. Assessment completed upon patients arrival to unit and care assumed.

## 2019-02-13 NOTE — ANESTHESIA POSTPROCEDURE EVALUATION
Procedure(s): ENDOSCOPIC ULTRASOUND (EUS). Anesthesia Post Evaluation Multimodal analgesia: multimodal analgesia used between 6 hours prior to anesthesia start to PACU discharge Patient location during evaluation: bedside Patient participation: complete - patient participated Level of consciousness: lethargic Pain management: adequate Airway patency: patent Anesthetic complications: no 
Cardiovascular status: hypotensive Respiratory status: acceptable Hydration status: acceptable Comments: Nephrology and IM called in pacu. Pt to ICU for hypotension Post anesthesia nausea and vomiting:  controlled Visit Vitals /81 Pulse 63 Temp 36.6 °C (97.9 °F) Resp 14 Ht 5' 5\" (1.651 m) Wt 55.5 kg (122 lb 5.7 oz) SpO2 99% BMI 20.36 kg/m²

## 2019-02-13 NOTE — PROGRESS NOTES
Renal Progress Note Follow up of ESRD Assessment/Plan: 1. ESRD. Hasn't had dialysis since 2/9 due to altered ms/hypotension. Will attempt dialysis later today to provide clearance. Will not attempt to pull volume. 2. Hypotension due to sbp/liver failure/sepsis. Continue midodrin. Will give iv albumin with dialysis today. 3. Anemia of ckd. Continue greg with dialysis. 4. Secondary hyperparathyroidism of ckd. Continue to hold sensipar due to hypocalcemia. 5. Liver cirrhosis/ascites/sbp. On abx. GI on the case. ERCP is planned for today. 6. Systolic chf.  
7. UTI. On abx.  
8. Altered ms. Worsened today. Etio? Sbp/UTI? Subjective: 
Late entry, patient was seen at 8:45 am.  
 
Patient complaints off: Obtunded this am, barely opens eyes to voice this am. Per nurse was able to take pills. Patient Active Problem List  
Diagnosis Code  Metabolic encephalopathy W82.21  
 UTI (urinary tract infection) N39.0  Cirrhosis ([facility] [address] K74.60  ESRD (end stage renal disease) on dialysis ([facility]) N18.6, Z99.2  COPD (chronic obstructive pulmonary disease) ([facility]) J44.9  Hyperbilirubinemia E80.6  Combined systolic and diastolic heart failure ([facility]) I50.40  Cholestatic jaundice R17  
 [facility] K65.2 Current Facility-Administered Medications Medication Dose Route Frequency Provider Last Rate Last Dose  LORazepam (ATIVAN) tablet 0.5 mg  0.5 mg Oral Q12H PRN Raghavendra Guzman MD   0.5 mg at 02/12/19 1225  
 midodrine (PROAMITINE) tablet 5 mg  5 mg Oral Q8H Seble Zimmerman MD   5 mg at 02/13/19 0921  
 rifAXIMin (XIFAXAN) tablet 550 mg  550 mg Oral BID Mandi Paul MD   550 mg at 02/13/19 0921  
 lactulose (CHRONULAC) solution 10 g  15 mL Oral TID Mandi Paul MD   10 g at 02/13/19 8805  cefTRIAXone (ROCEPHIN) 1 g in 0.9% sodium chloride (MBP/ADV) 50 mL MBP  1 g IntraVENous Q24H Jennifer Smith  mL/hr at 02/12/19 1710 1 g at 02/12/19 1710  B complex-vitamin C-folic acid (NEPHRO-NACHO) 0.8 mg tab  1 Tab Oral DAILY Jesus Ruiz MD   1 Tab at 02/13/19 7106  sevelamer carbonate (RENVELA) tab 800 mg  800 mg Oral TID WITH MEALS Jesus Ruiz MD   800 mg at 02/13/19 2163  
 arformoterol (BROVANA) neb solution 15 mcg  15 mcg Nebulization BID RT Jesus Ruiz MD   15 mcg at 02/13/19 6831 And  budesonide (PULMICORT) 500 mcg/2 ml nebulizer suspension  500 mcg Nebulization BID RT Jesus Ruiz MD   500 mcg at 02/13/19 5485 Objective: 
Vitals:  
 02/13/19 5630 02/13/19 5759 02/13/19 0801 02/13/19 1908 BP: 90/47 92/48 94/53 Pulse: 74 71 77 Resp: 18 18 16 Temp: 97.4 °F (36.3 °C) 97.3 °F (36.3 °C) 97.4 °F (36.3 °C) TempSrc:      
SpO2: 100% 97% 98% 96% Weight:  55.5 kg (122 lb 5.7 oz) Height:      
 
. Intake/Output Summary (Last 24 hours) at 2/13/2019 1222 Last data filed at 2/12/2019 1710 Gross per 24 hour Intake 0 ml Output  Net 0 ml Admission weight:Weight: 61.2 kg (135 lb) (02/08/19 0706) Last Weight Metrics: 
Weight Loss Metrics 2/13/2019 10/30/2018 Today's Wt 122 lb 5.7 oz 139 lb BMI 20.36 kg/m2 23.13 kg/m2 Physical Exam:  
 
General: No acute distress. Neck: no jvd. LUNGS: good air entry, bl exp rhonchi. CVS EXM: S1, S2, no murmur, rub or gallop. Abdomen: Soft, Non Tender, slightly distended, non tense ascites. Lower Extremities: No Edema. Access: lt arm avf, good thrill, dressings intact. Labs: CBC w/Diff Recent Labs  
  02/13/19 
0600 02/12/19 
0600 02/11/19 
0540 WBC 12.1 11.6 9.5  
RBC 2.77* 3.13* 3.47* HGB 7.2* 8.2* 8.9* HCT 21.2* 24.4* 26.7*  
PLT 58* 74* 103* GRANS 87* 87* 83* LYMPH 7* 9* 11* EOS 0 0 0 Chemistry Recent Labs  
  02/13/19 
0600 02/12/19 
0600 02/11/19 
0540 GLU 73* 84 63*  136 137  
K 3.9 4.0 3.9 CL 95* 95* 96* CO2 24 28 28 BUN 38* 34* 29* CREA 6.50* 5.86* 5.22* CA 7.6* 7.5* 7.9* AGAP 17 13 13 BUCR 6* 6* 6* * 746*  --   
TP 5.7* 5.3*  --   
ALB 2.4* 1.3* 1.3*  
GLOB 3.3 4.0  --   
AGRAT 0.7* 0.3*  --   
PHOS  --   --  4.9 Lab Results Component Value Date/Time Iron 67 02/09/2019 05:48 AM  
 TIBC 143 (L) 02/09/2019 05:48 AM  
 Iron % saturation 47 02/09/2019 05:48 AM  
  
Lab Results Component Value Date/Time Calcium 7.6 (L) 02/13/2019 06:00 AM  
 CALCIUM,IONIZED 4.50 10/30/2018 11:15 AM  
 Phosphorus 4.9 02/11/2019 05:40 AM  
  
 
 
Lamont Jordan M.D [facility] Office [phone] Pager [phone]

## 2019-02-13 NOTE — ANESTHESIA PREPROCEDURE EVALUATION
Anesthetic History No history of anesthetic complications Review of Systems / Medical History Patient summary reviewed and pertinent labs reviewed Pulmonary COPD: moderate Smoker Neuro/Psych Dementia Cardiovascular Hypertension: well controlled CAD Exercise tolerance: >4 METS Comments: Bb this am 
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 
  
 
 
 
Liver disease Endo/Other Diabetes: type 2 Other Findings Physical Exam 
 
Airway Mallampati: III 
TM Distance: 4 - 6 cm Neck ROM: normal range of motion Mouth opening: Normal 
 
 Cardiovascular Regular rate and rhythm,  S1 and S2 normal,  no murmur, click, rub, or gallop Dental 
 
Dentition: Edentulous Pulmonary Breath sounds clear to auscultation Abdominal 
GI exam deferred Other Findings Anesthetic Plan ASA: 4 Anesthesia type: total IV anesthesia and MAC Induction: Intravenous Anesthetic plan and risks discussed with: Family and Son / Daughter I have informed the patient or their guardian of the nature and purpose of the type of anesthesia, the reasonable alternative anesthetic methods, pertinent foreseeable risks involved and the possibility of complications. I have explained that an alternative form of anesthesia may be required by unexpected conditions arising before or during the procedure. It is understood that general anesthesia may be required for safety or comfort. Questions have been answered to the satisfaction of the patient who accepts the risks and agrees to proceed as planned. The above anesthetic review of medical history, physical exam, tests, assessment, subsequent anesthetic plan and consent have been accomplished pre-procedure.  
 
Patient unable to remove denture plates, I discussed that we would be using a bite block this may result in damage to the plates or need to abort procedure.

## 2019-02-13 NOTE — PROCEDURES
Endoscopic Ultrasound Procedure Note    Patient: Ricky Palmer MRN: [id number]  SSN: [id number]    YOB: 1970  Age: 46 y.o. Sex: female      Date/Time:  2/13/2019 1:18 PM    Procedure: EGD and EUS limited to pancreaticobiliary system. IMPRESSION:   1. No esophageal or gastric varices. 2.   Multiple small duodenal ulcers. 3.   Gastritis. 4.   Normal EUS of the pancreas and biliary system. No CBD stone or stricture. 5.   Moderate perihepatic ascites. RECOMMENDATIONS:  1. Resume prior diet and medications. 2.   Medical management of her decompensated chronic liver disease with cholestasis. Indication:  Cholestasis with normal US and CT scan. Must evaluate for occult CBD stone or obstruction. :  Jose F Zavala MD  Referring Provider:   Marie Oropeza MD  History: The history and physical exam were reviewed and updated. Endoscope: Olympus GIF-H190 and UE-160. Extent of Exam: third portion of the duodenum  ASA: ASA 3 - Patient with moderate systemic disease with functional limitations  Anethesia/Sedation:  MAC anesthesia    Description of the procedure: The procedure was discussed with the patient including risks, benefits, alternatives including risks of iv sedation, bleeding, perforation and aspiration. A safety timeout was performed. The patient was placed in the left lateral decubitus position. A bite block was placed. Monitored anesthesia was given by the CRNA. The patients vital signs were monitored at all times including heart rate/rhythm, blood pressure and oxygen saturation. The GIF-H190 endoscope was then passed under direct visualization to the third portion of the duodenum. Endoscopic evaluation of the esophagus, stomach and duodenum was performed. Retroflexed view of the cardia was achieved. After careful inspection of the mucosa, the scope was removed and a radial scanning EUS scope was advanced into the stomach and distal duodenum.   The pancreas head and surrounding areas were imaged with ultrasound. The endoscope was then withdrawn into the stomach. The pancreas body and tail and surrounding areas were again visualized with ultrasound. The endoscope was withdrawn into the esophagus. The mediastinum and esophagus were imaged with ultrasound. The endoscope was then withdrawn and patient transferred to recovery in stable condition. Findings:   Esophagus:  Z-line was at 40 cm. No ulcer, mass or varices. Stomach: Moderate degree of retained liquid in fundus that was suctioned. Pinpoint antral erosions present. No ulcer or varices. Duodenum: Multiple, 4-8 mm, shallow ulcers with eschar in the bulb. No mass or stricture. Normal 2nd portion. Major papilla was noted and was normal.   EUS:  Normal pancreatic parenchyma. Main pancreatic duct was normal and measured 2.2 mm. No cystic or solid lesions. The common bile duct was easily traced from the hilum of the liver to the ampulla and was normal without stone or stricture. The distal CBD measured 2.5 mm and proximal CBD between 3-4 mm. The gallbladder was contracted without stones. Moderate degree of ascites around the liver. No peripancreatic, portal or celiac adenopathy. Specimens:  None           Complications:   None; patient tolerated the procedure well. EBL:None      Richardson Cook MD, FACP  February 13, 2019  1:18 PM

## 2019-02-13 NOTE — PROGRESS NOTES
1530 TRANSFER - IN REPORT: 
 
Verbal report received from [facility] rn(name) on Angie Gordon  being received from PACU(unit) for change in patient condition(hypotension) Report consisted of patients Situation, Background, Assessment and  
Recommendations(SBAR). Information from the following report(s) SBAR, Kardex, Intake/Output and MAR was reviewed with the receiving nurse. Opportunity for questions and clarification was provided. Assessment completed upon patients arrival to unit and care assumed. 1615 Pt intubated. CVL placed. 1740 Family at the bedside. 1815 OG tube placed. Placement verified. 1930 Bedside verbal shift change report given to arjun Leiva (oncoming nurse) by Tomas Flood (offgoing nurse). Report included the following information SBAR, Kardex, Intake/Output and MAR.

## 2019-02-13 NOTE — PROGRESS NOTES
0830: PT session attempted. Restless in bed with no command following or meaningful interaction with PT. Further skilled PT not indicated at this time. Discontinuing PT orders. Please reorder if change in mental status allow for meaningful and safe participation in functional mobility. Thank you, Hill Grant PT, DPT Office Extension: [phone] Pager: [phone]

## 2019-02-14 ENCOUNTER — APPOINTMENT (OUTPATIENT)
Dept: GENERAL RADIOLOGY | Age: 49
DRG: 871 | End: 2019-02-14
Attending: INTERNAL MEDICINE
Payer: MEDICARE

## 2019-02-14 LAB
ALBUMIN SERPL-MCNC: 2.1 G/DL (ref 3.4–5)
ANION GAP SERPL CALC-SCNC: 16 MMOL/L (ref 3–18)
ARTERIAL PATENCY WRIST A: ABNORMAL
BASE EXCESS BLD CALC-SCNC: 4 MMOL/L
BASOPHILS # BLD: 0.1 K/UL (ref 0–0.1)
BASOPHILS NFR BLD: 1 % (ref 0–2)
BDY SITE: ABNORMAL
BODY TEMPERATURE: 98.3
BUN SERPL-MCNC: 14 MG/DL (ref 7–18)
BUN/CREAT SERPL: 4 (ref 12–20)
CALCIUM SERPL-MCNC: 8.4 MG/DL (ref 8.5–10.1)
CHLORIDE SERPL-SCNC: 99 MMOL/L (ref 100–108)
CO2 SERPL-SCNC: 25 MMOL/L (ref 21–32)
CREAT SERPL-MCNC: 3.16 MG/DL (ref 0.6–1.3)
DIFFERENTIAL METHOD BLD: ABNORMAL
EOSINOPHIL # BLD: 0.1 K/UL (ref 0–0.4)
EOSINOPHIL NFR BLD: 1 % (ref 0–5)
ERYTHROCYTE [DISTWIDTH] IN BLOOD BY AUTOMATED COUNT: 25.3 % (ref 11.6–14.5)
EST. AVERAGE GLUCOSE BLD GHB EST-MCNC: 148 MG/DL
GAS FLOW.O2 O2 DELIVERY SYS: ABNORMAL L/MIN
GAS FLOW.O2 SETTING OXYMISER: 14 BPM
GLUCOSE BLD STRIP.AUTO-MCNC: 74 MG/DL (ref 70–110)
GLUCOSE BLD STRIP.AUTO-MCNC: 82 MG/DL (ref 70–110)
GLUCOSE BLD STRIP.AUTO-MCNC: 86 MG/DL (ref 70–110)
GLUCOSE BLD STRIP.AUTO-MCNC: 94 MG/DL (ref 70–110)
GLUCOSE SERPL-MCNC: 75 MG/DL (ref 74–99)
HBA1C MFR BLD: 6.8 % (ref 4.2–5.6)
HCO3 BLD-SCNC: 25.2 MMOL/L (ref 22–26)
HCT VFR BLD AUTO: 22.4 % (ref 35–45)
HCV GENOTYPE: NORMAL
HCV RNA SERPL NAA+PROBE-ACNC: NORMAL IU/ML
HCV RNA SERPL NAA+PROBE-LOG IU: NORMAL LOG10 IU/ML
HGB BLD-MCNC: 7.7 G/DL (ref 12–16)
INR PPP: 1.5 (ref 0.8–1.2)
INSPIRATION.DURATION SETTING TIME VENT: 1 SEC
LYMPHOCYTES # BLD: 1.3 K/UL (ref 0.9–3.6)
LYMPHOCYTES NFR BLD: 11 % (ref 21–52)
MCH RBC QN AUTO: 26.6 PG (ref 24–34)
MCHC RBC AUTO-ENTMCNC: 34.4 G/DL (ref 31–37)
MCV RBC AUTO: 77.5 FL (ref 74–97)
MONOCYTES # BLD: 0.6 K/UL (ref 0.05–1.2)
MONOCYTES NFR BLD: 5 % (ref 3–10)
NEUTS SEG # BLD: 9.8 K/UL (ref 1.8–8)
NEUTS SEG NFR BLD: 82 % (ref 40–73)
O2/TOTAL GAS SETTING VFR VENT: 35 %
PCO2 BLD: 24.8 MMHG (ref 35–45)
PEEP RESPIRATORY: 5 CMH2O
PH BLD: 7.62 [PH] (ref 7.35–7.45)
PHOSPHATE SERPL-MCNC: 2.7 MG/DL (ref 2.5–4.9)
PLATELET # BLD AUTO: 75 K/UL (ref 135–420)
PO2 BLD: 125 MMHG (ref 80–100)
POTASSIUM SERPL-SCNC: 3.5 MMOL/L (ref 3.5–5.5)
PROTHROMBIN TIME: 18.3 SEC (ref 11.5–15.2)
RBC # BLD AUTO: 2.89 M/UL (ref 4.2–5.3)
SAO2 % BLD: 99 % (ref 92–97)
SERVICE CMNT-IMP: ABNORMAL
SODIUM SERPL-SCNC: 140 MMOL/L (ref 136–145)
SPECIMEN TYPE: ABNORMAL
TEST INFORMATION, 550045: NORMAL
TOTAL RESP. RATE, ITRR: 19
VENTILATION MODE VENT: ABNORMAL
VOLUME CONTROL PLUS IVLCP: YES
VT SETTING VENT: 400 ML
WBC # BLD AUTO: 11.8 K/UL (ref 4.6–13.2)

## 2019-02-14 PROCEDURE — 36591 DRAW BLOOD OFF VENOUS DEVICE: CPT

## 2019-02-14 PROCEDURE — 82962 GLUCOSE BLOOD TEST: CPT

## 2019-02-14 PROCEDURE — 36600 WITHDRAWAL OF ARTERIAL BLOOD: CPT

## 2019-02-14 PROCEDURE — 74011000250 HC RX REV CODE- 250: Performed by: FAMILY MEDICINE

## 2019-02-14 PROCEDURE — 85610 PROTHROMBIN TIME: CPT

## 2019-02-14 PROCEDURE — 77030032490 HC SLV COMPR SCD KNE COVD -B

## 2019-02-14 PROCEDURE — 74011250636 HC RX REV CODE- 250/636: Performed by: INTERNAL MEDICINE

## 2019-02-14 PROCEDURE — 65610000006 HC RM INTENSIVE CARE

## 2019-02-14 PROCEDURE — 74011000250 HC RX REV CODE- 250: Performed by: INTERNAL MEDICINE

## 2019-02-14 PROCEDURE — 74011250637 HC RX REV CODE- 250/637: Performed by: INTERNAL MEDICINE

## 2019-02-14 PROCEDURE — 74011000258 HC RX REV CODE- 258: Performed by: FAMILY MEDICINE

## 2019-02-14 PROCEDURE — 74011000250 HC RX REV CODE- 250: Performed by: HOSPITALIST

## 2019-02-14 PROCEDURE — 77030018846 HC SOL IRR STRL H20 ICUM -A

## 2019-02-14 PROCEDURE — 80069 RENAL FUNCTION PANEL: CPT

## 2019-02-14 PROCEDURE — 94640 AIRWAY INHALATION TREATMENT: CPT

## 2019-02-14 PROCEDURE — 74011000258 HC RX REV CODE- 258: Performed by: INTERNAL MEDICINE

## 2019-02-14 PROCEDURE — 77030020186 HC BOOT HL PROTCT SAGE -B

## 2019-02-14 PROCEDURE — 74011250637 HC RX REV CODE- 250/637: Performed by: HOSPITALIST

## 2019-02-14 PROCEDURE — 82803 BLOOD GASES ANY COMBINATION: CPT

## 2019-02-14 PROCEDURE — 85025 COMPLETE CBC W/AUTO DIFF WBC: CPT

## 2019-02-14 PROCEDURE — 83036 HEMOGLOBIN GLYCOSYLATED A1C: CPT

## 2019-02-14 PROCEDURE — 94003 VENT MGMT INPAT SUBQ DAY: CPT

## 2019-02-14 PROCEDURE — 71045 X-RAY EXAM CHEST 1 VIEW: CPT

## 2019-02-14 RX ORDER — DEXTROSE MONOHYDRATE 25 G/50ML
25-50 INJECTION, SOLUTION INTRAVENOUS AS NEEDED
Status: DISCONTINUED | OUTPATIENT
Start: 2019-02-14 | End: 2019-02-23 | Stop reason: HOSPADM

## 2019-02-14 RX ORDER — INSULIN LISPRO 100 [IU]/ML
INJECTION, SOLUTION INTRAVENOUS; SUBCUTANEOUS EVERY 6 HOURS
Status: DISCONTINUED | OUTPATIENT
Start: 2019-02-14 | End: 2019-02-16

## 2019-02-14 RX ORDER — MAGNESIUM SULFATE 100 %
4 CRYSTALS MISCELLANEOUS AS NEEDED
Status: DISCONTINUED | OUTPATIENT
Start: 2019-02-14 | End: 2019-02-23 | Stop reason: HOSPADM

## 2019-02-14 RX ADMIN — MIDODRINE HYDROCHLORIDE 5 MG: 2.5 TABLET ORAL at 22:46

## 2019-02-14 RX ADMIN — SODIUM CHLORIDE 0.7 MCG/KG/HR: 900 INJECTION, SOLUTION INTRAVENOUS at 20:18

## 2019-02-14 RX ADMIN — LACTULOSE 10 G: 20 SOLUTION ORAL at 09:00

## 2019-02-14 RX ADMIN — BUDESONIDE 500 MCG: 0.5 INHALANT RESPIRATORY (INHALATION) at 20:04

## 2019-02-14 RX ADMIN — MIDODRINE HYDROCHLORIDE 5 MG: 2.5 TABLET ORAL at 14:33

## 2019-02-14 RX ADMIN — LACTULOSE 10 G: 20 SOLUTION ORAL at 17:53

## 2019-02-14 RX ADMIN — NOREPINEPHRINE BITARTRATE 8 MCG/MIN: 1 INJECTION INTRAVENOUS at 03:30

## 2019-02-14 RX ADMIN — MIDODRINE HYDROCHLORIDE 5 MG: 2.5 TABLET ORAL at 00:00

## 2019-02-14 RX ADMIN — IPRATROPIUM BROMIDE AND ALBUTEROL SULFATE 3 ML: .5; 3 SOLUTION RESPIRATORY (INHALATION) at 11:22

## 2019-02-14 RX ADMIN — SODIUM CHLORIDE 0.7 MCG/KG/HR: 900 INJECTION, SOLUTION INTRAVENOUS at 10:47

## 2019-02-14 RX ADMIN — LACTULOSE 10 G: 20 SOLUTION ORAL at 00:00

## 2019-02-14 RX ADMIN — SODIUM CHLORIDE 0.7 MCG/KG/HR: 900 INJECTION, SOLUTION INTRAVENOUS at 00:00

## 2019-02-14 RX ADMIN — Medication 1 TABLET: at 09:00

## 2019-02-14 RX ADMIN — IPRATROPIUM BROMIDE AND ALBUTEROL SULFATE 3 ML: .5; 3 SOLUTION RESPIRATORY (INHALATION) at 00:11

## 2019-02-14 RX ADMIN — IPRATROPIUM BROMIDE AND ALBUTEROL SULFATE 3 ML: .5; 3 SOLUTION RESPIRATORY (INHALATION) at 20:04

## 2019-02-14 RX ADMIN — MIDODRINE HYDROCHLORIDE 5 MG: 2.5 TABLET ORAL at 05:28

## 2019-02-14 RX ADMIN — RIFAXIMIN 550 MG: 550 TABLET ORAL at 00:00

## 2019-02-14 RX ADMIN — ARFORMOTEROL TARTRATE 15 MCG: 15 SOLUTION RESPIRATORY (INHALATION) at 20:04

## 2019-02-14 RX ADMIN — NOREPINEPHRINE BITARTRATE 10 MCG/MIN: 1 INJECTION INTRAVENOUS at 15:29

## 2019-02-14 RX ADMIN — LACTULOSE 10 G: 20 SOLUTION ORAL at 22:46

## 2019-02-14 RX ADMIN — IPRATROPIUM BROMIDE AND ALBUTEROL SULFATE 3 ML: .5; 3 SOLUTION RESPIRATORY (INHALATION) at 08:02

## 2019-02-14 RX ADMIN — RIFAXIMIN 550 MG: 550 TABLET ORAL at 09:00

## 2019-02-14 RX ADMIN — ARFORMOTEROL TARTRATE 15 MCG: 15 SOLUTION RESPIRATORY (INHALATION) at 08:01

## 2019-02-14 RX ADMIN — CEFTRIAXONE 1 G: 1 INJECTION, POWDER, FOR SOLUTION INTRAMUSCULAR; INTRAVENOUS at 17:52

## 2019-02-14 RX ADMIN — IPRATROPIUM BROMIDE AND ALBUTEROL SULFATE 3 ML: .5; 3 SOLUTION RESPIRATORY (INHALATION) at 04:29

## 2019-02-14 RX ADMIN — RIFAXIMIN 550 MG: 550 TABLET ORAL at 20:56

## 2019-02-14 RX ADMIN — BUDESONIDE 500 MCG: 0.5 INHALANT RESPIRATORY (INHALATION) at 08:01

## 2019-02-14 RX ADMIN — IPRATROPIUM BROMIDE AND ALBUTEROL SULFATE 3 ML: .5; 3 SOLUTION RESPIRATORY (INHALATION) at 15:22

## 2019-02-14 NOTE — PROGRESS NOTES
Problem: Pressure Injury - Risk of 
Goal: *Prevention of pressure injury Document Honorio Scale and appropriate interventions in the flowsheet. Outcome: Progressing Towards Goal 
Pressure Injury Interventions: 
Sensory Interventions: Assess changes in LOC, Minimize linen layers Moisture Interventions: Minimize layers Activity Interventions: Assess need for specialty bed Mobility Interventions: Assess need for specialty bed Nutrition Interventions: Document food/fluid/supplement intake Friction and Shear Interventions: Minimize layers Problem: Falls - Risk of 
Goal: *Absence of Falls Document Florence Hemphill Fall Risk and appropriate interventions in the flowsheet. Outcome: Progressing Towards Goal 
Fall Risk Interventions: 
Mobility Interventions: Bed/chair exit alarm Mentation Interventions: Bed/chair exit alarm Medication Interventions: Bed/chair exit alarm Elimination Interventions: Bed/chair exit alarm, Call light in reach History of Falls Interventions: Bed/chair exit alarm Problem: Patient Education: Go to Patient Education Activity Goal: Patient/Family Education Outcome: Not Met Pt is intubated, sedated Problem: Discharge Planning Goal: *Discharge to safe environment Outcome: Progressing Towards Goal 
Plan for SNF Problem: Ventilator Management Goal: *Patient maintains clear airway/free of aspiration Outcome: Progressing Towards Goal 
Interventions are ongoing

## 2019-02-14 NOTE — DIABETES MGMT
Nutrition RE-assessment/Plan of care RECOMMENDATIONS:  
1. For tube feeding, suggest Nepro starting with trickle feeds of 10 ml/hr (432 calories, 19 grams protein, 170 ml free water/day. 2.  When able to advance tube feeding, suggest final goal rate of 40 ml/hr. This will provide 1728 calories, 76 grams protein, ~ 0.7 L free water/day, closely meeting estimated energy and protein needs. 3.  Monitor TF, labs and weights. GOALS:  
1. Tube feeding will meet >75% of protein/calorie needs by 2/17 2. Weight Maintenance (+/- 1-2 lb) by  2/24 ASSESSMENT: 
Wt status is classified as normal per BMI of 20.2. However per documented weight records Pt w/ weight loss equating to 18 lb or 13% x ~3 months. History of poor PO intake. Labs noted. Pt w/ hypoalbuminemia, elevate Bili (21.7), elevated Alk Phos (571) and elevated BUN/Cr; GFR (13). Ready to start tube feeding at trickle rate. Nutrition Diagnoses:  
Unintentional weight loss related to inadequate energy intake PTA as evidenced by an 18 lb or 13% x ~3 months per documented weight records. Inadequate energy intake PTA related to decreased appetite as evidenced by diet recall over the past couple of months. Inadequate oral food and beverage intake due to intubation as evidenced by NPO orders. Meets Criteria for Chronic Malnutrition  
[x] Severe Malnutrition, as evidenced by: 
 [x] Severe muscle wasting, loss of subcutaneous fat 
 [x] Nutritional intake of <75% of recommended intake for >1 month 
 [x] Weight loss of  >5% in 1 month, >7.5% in 3 months, >10% in 6 months, >20% in 1 year 
 [] Severe edema Nutrition Risk:  [x] High  [] Moderate []  Low SUBJECTIVE/OBJECTIVE: 
Pt admitted for UTI/metabolic encephalopathy. Pt w/ ESRD on HD. Pt w/ jaundice eyes and elevated hepatic panel. Noted per documented weight records Pt w/ weight loss equating to 18 lb or 13% x ~3 months.  Pt appears thin with noted muscle wasting and SQ fat loss of extremities. Per diet/weight history obtained by RD on 2/10/19 (prior to intubation) pt reported poor appetite for the past 2 months and minimal PO intake. 2/14/19:  Now intubated. Information Obtained from:   
 [x] Chart Review 
 [] Patient 
 [] Family/Caregiver 
 [] Nurse/Physician 
 [x] Interdisciplinary Meeting/Rounds Diet: NPO - has OGT Medications: [x] Reviewed Nephrovite,Lactulose,  Nephro-Mejia, Allergies: [x] Reviewed Encounter Diagnoses ICD-10-CM ICD-9-CM 1. Urinary tract infection without hematuria, site unspecified N39.0 599.0 2. Hyperbilirubinemia E80.6 782.4 3. Confusion R41.0 298.9 4. [facility] Z99.2 V45.11 Labs:   
Lab Results Component Value Date/Time Sodium 140 02/14/2019 03:45 AM  
 Potassium 3.5 02/14/2019 03:45 AM  
 Chloride 99 (L) 02/14/2019 03:45 AM  
 CO2 25 02/14/2019 03:45 AM  
 Anion gap 16 02/14/2019 03:45 AM  
 Glucose 75 02/14/2019 03:45 AM  
 BUN 14 02/14/2019 03:45 AM  
 Creatinine 3.16 (H) 02/14/2019 03:45 AM  
 Calcium 8.4 (L) 02/14/2019 03:45 AM  
 Phosphorus 2.7 02/14/2019 03:45 AM  
 Albumin 2.1 (L) 02/14/2019 03:45 AM  
 
Anthropometrics: BMI (calculated): 17.5 Last 3 Recorded Weights in this Encounter 02/11/19 1412 02/13/19 0435 02/14/19 0500 Weight: 54.9 kg (121 lb) 55.5 kg (122 lb 5.7 oz) 47.6 kg (104 lb 15 oz) Ht Readings from Last 1 Encounters:  
02/11/19 5' 5\" (1.651 m) Weight Metrics 2/14/2019 10/30/2018 Weight 104 lb 15 oz 139 lb BMI 17.46 kg/m2 23.13 kg/m2 Patient Vitals for the past 100 hrs: 
 % Diet Eaten 02/12/19 1710 0 % 02/12/19 1230 0 % 02/12/19 0930 0 % 02/11/19 1800 10 % IBW: 125 lb %IBW: 97% UBW: 130s lb [x] Weight Loss [] Weight Gain [] Weight Stable Estimated Nutrition Needs: [x] MSJ  [] Other: 
Calories: 2452-9635 kcal Based on:   [x] Actual BW   
Protein:   66-77 g Based on:   [x] Actual BW   
 Fluid:       UO + 1000 ml Based on:   [x] Actual BW  
 
 [x] No Cultural, Episcopalian or ethnic dietary need identified. [] Cultural, Episcopalian and ethnic food preferences identified and addressed Wt Status:  [x] Normal (18.6 - 24.9) [] Underweight (<18.5) [] Overweight (25 - 29.9) [] Mild Obesity (30 - 34.9)  [] Moderate Obesity (35 - 39.9) [] Morbid Obesity (40+) Nutrition Problems Identified:  
[] Suboptimal PO intake  
[] Food Allergies [x] Difficulty chewing/swallowing/poor dentition (while receiving po) [] Constipation/Diarrhea  
[] Nausea/Vomiting  
[] None 
[x] Other: intubated/NPO Plan:  
[] Therapeutic Diet 
[]  Obtained/adjusted food preferences/tolerances and/or snacks options  
[]  Supplements added  
[] Occupational therapy following for feeding techniques []  HS snack added  
[]  Modify diet texture  
[]  Modify diet for food allergies []  Educate patient/family once all testing finished 
[]  Assist with menu selection []  Monitor PO intake on meal rounds  
[x]  Continue inpatient monitoring and intervention  
[]  Participated in discharge planning/Interdisciplinary rounds/Team meetings  
[x]  Other: trickle tube feeding Education Needs: 
 [x] Not appropriate for teaching at this time due to: intubation 
 [] Identified and addressed Nutrition Monitoring and Evaluation: 
[x] Continue ongoing monitoring and intervention 
[] Other Estrada Milton RD, CDE Office:  [phone] [place] Pager:  [phone]

## 2019-02-14 NOTE — PROGRESS NOTES
45 yo BF with cholestatic jaundice( no biliary obstruction on imaging studies), ESRD, CHF, Ascites, peritonitis, sepsis, 
 on pressors. Remains intubated. Plan: Continue supportive care

## 2019-02-14 NOTE — PROGRESS NOTES
Problem: Pressure Injury - Risk of 
Goal: *Prevention of pressure injury Document Honorio Scale and appropriate interventions in the flowsheet. Outcome: Progressing Towards Goal 
Pressure Injury Interventions: 
Sensory Interventions: Assess changes in LOC, Minimize linen layers Moisture Interventions: Minimize layers Activity Interventions: Assess need for specialty bed Mobility Interventions: Assess need for specialty bed Nutrition Interventions: Document food/fluid/supplement intake Friction and Shear Interventions: Minimize layers Problem: Falls - Risk of 
Goal: *Absence of Falls Document Florence Hemphill Fall Risk and appropriate interventions in the flowsheet. Outcome: Progressing Towards Goal 
Fall Risk Interventions: 
Mobility Interventions: Bed/chair exit alarm Mentation Interventions: Bed/chair exit alarm Medication Interventions: Bed/chair exit alarm Elimination Interventions: Bed/chair exit alarm, Call light in reach History of Falls Interventions: Bed/chair exit alarm

## 2019-02-14 NOTE — DIALYSIS
ACUTE HEMODIALYSIS FLOW SHEET 
 
HEMODIALYSIS ORDERS: Physician: Dr. Je Bran Dialyzer: revaclear   Duration: 3.5 hr  BFR: 350   DFR: 800 Dialysate:  Temp 35.5 K+   3    Ca+  3 Na 140 Bicarb 35 Weight:  55.5 kg    Patient Chart []     Unable to Obtain []   Dry weight/UF Goal: 500 ml Access LUE AVF Needle Gauge 15 Heparin []  Bolus      Units    [] Hourly       Units    [x]None Catheter locking solution Pre BP:   100/56    Pulse:     65      Respirations: 15 Temperature:   95.4  Tx: NS   250    ml/Bolus  Other        [] N/A Labs: Pre        Post:        [] N/A Additional Orders(medications, blood products, hypotension management):       [] N/A [x] [facility] Consent Verified CATHETER ACCESS: [x]N/A   []Right   []Left   []IJ     []Fem   []Chest wall  
[] First use X-ray verified     []Tunnel                [] Non Tunneled []No S/S infection  []Redness  []Drainage []Cultured []Swelling []Pain []Medical Aseptic Prep Utilized   []Dressing Changed  [] Biopatch  Date:      
[]Clotted   []Patent   Flows: []Good  []Poor  []Reversed If access problem,  notified: []Yes    []N/A  Date:        
 
GRAFT/FISTULA ACCESS:  []N/A     []Right     [x]Left     [x]UE     []LE [x]AVG   []AVF        []Buttonhole    []Medical Aseptic Prep Utilized [x]No S/S infection  []Redness  []Drainage []Cultured []Swelling []Pain Bruit:   [x] Strong    [] Weak       Thrill :   [x] Strong    [] Weak Needle Gauge: 15   Length: 1 inch If access problem,  notified: []Yes     [x]N/A  Date:       
Please describe access if present and not used:  
 
 
GENERAL ASSESSMENT:   
LUNGS:  Rate 16 SaO2%   99     [] N/A    [x] Clear  [] Coarse  [] Crackles  [] Wheezing 
      [] Diminished     Location : []RLL   []LLL    []RUL  []ADINA Cough: []Productive  []Dry  []N/A   Respirations:  [x]Easy  []Labored Therapy:  []RA  []NC  l/min    Mask: []NRB []Venti       O2% [x]Ventilator  [x]Intubated  [] Trach  [] BiPaP CARDIAC: [x]Regular      [] Irregular   [] Pericardial Rub  [] JVD []  Monitored  [] Bedside  [] Remotely monitored [] N/A  Rhythm: EDEMA: [x] None  []Generalized  [] Pitting [] 1    [] 2    [] 3    [] 4 [] Facial  [] Pedal  []  UE  [] LE  
SKIN:   [x] Warm  [] Hot     [] Cold   [x] Dry     [] Pale   [] Diaphoretic    
             [] Flushed  [] Jaundiced  [] Cyanotic  [] Rash  [] Weeping LOC:    [] Alert      []Oriented:    [] Person     [] Place  []Time 
             [] Confused  [] Lethargic  [x] Medicated  [] Non-responsive GI / ABDOMEN:  [] Flat    [] Distended    [x] Soft    [] Firm   []  Obese 
                           [] Diarrhea  [] Bowel Sounds  [] Nausea  [] Vomiting  / URINE ASSESSMENT:[] Voiding   [] Oliguria  [] Anuria   []  Reis [] Incontinent    []  Incontinent Brief      []  Bathroom Privileges PAIN: [x] 0 []1  []2   []3   []4   []5   []6   []7   []8   []9   []10 Scale 0-10  Action/Follow Up: MOBILITY:  [] Amb    [] Amb/Assist    [x] Bed    [] Wheelchair  [] Stretcher All Vitals and Treatment Details on Attached Flowsheet Hospital: [facility] Room # 2325 [] 1st Time Acute  [] Stat  [x] Routine  [] Urgent    
[] Acute Room  []  Bedside  [x] ICU/CCU  [] ER Isolation Precautions:  [x] Dialysis   [] Airborne   [] Contact    [] Reverse Special Considerations:         [] Blood Consent Verified [x]N/A ALLERGIES:   [x] NKA Code Status:  [x] Full Code  [] DNR  [] Other HBsAg ONLY: Date Drawn 2/8/2019         []Negative []Positive []Unknown HBsAb: Date 2/8/2019    [] Susceptible   [x] [id number] []Not Drawn  [] Drawn Current Labs:    Date of Labs: 2/13/2019          Today [x] Cut and paste current labs here. Results for Deja Lopez (MRN [id number]) as of 2/13/2019 21:19 Ref.  Range 2/13/2019 06:00  
 Sodium Latest Ref Range: 136 - 145 mmol/L 136 Potassium Latest Ref Range: 3.5 - 5.5 mmol/L 3.9 Chloride Latest Ref Range: 100 - 108 mmol/L 95 (L)  
CO2 Latest Ref Range: 21 - 32 mmol/L 24 Anion gap Latest Ref Range: 3.0 - 18 mmol/L 17 Glucose Latest Ref Range: 74 - 99 mg/dL 73 (L) BUN Latest Ref Range: 7.0 - 18 MG/DL 38 (H) Creatinine Latest Ref Range: 0.6 - 1.3 MG/DL 6.50 (H) BUN/Creatinine ratio Latest Ref Range: 12 - 20   6 (L) Calcium Latest Ref Range: 8.5 - 10.1 MG/DL 7.6 (L)  
GFR est non-AA Latest Ref Range: >60 ml/min/1.73m2 7 (L) GFR est AA Latest Ref Range: >60 ml/min/1.73m2 8 (L) Bilirubin, total Latest Ref Range: 0.2 - 1.0 MG/DL 17.7 (H) Protein, total Latest Ref Range: 6.4 - 8.2 g/dL 5.7 (L) Albumin Latest Ref Range: 3.4 - 5.0 g/dL 2.4 (L) Globulin Latest Ref Range: 2.0 - 4.0 g/dL 3.3 A-G Ratio Latest Ref Range: 0.8 - 1.7   0.7 (L) ALT (SGPT) Latest Ref Range: 13 - 56 U/L 28 AST Latest Ref Range: 15 - 37 U/L 34 Alk. phosphatase Latest Ref Range: 45 - 117 U/L 540 (H) Results for Maribel Watson (MRN [id number]) as of 2/13/2019 21:19 Ref. Range 2/13/2019 06:00 WBC Latest Ref Range: 4.6 - 13.2 K/uL 12.1 RBC Latest Ref Range: 4.20 - 5.30 M/uL 2.77 (L) HGB Latest Ref Range: 12.0 - 16.0 g/dL 7.2 (L) HCT Latest Ref Range: 35.0 - 45.0 % 21.2 (L) MCV Latest Ref Range: 74.0 - 97.0 FL 76.5 MCH Latest Ref Range: 24.0 - 34.0 PG 26.0 MCHC Latest Ref Range: 31.0 - 37.0 g/dL 34.0  
RDW Latest Ref Range: 11.6 - 14.5 % 24.5 (H) PLATELET Latest Ref Range: 135 - 420 K/uL 58 (L) NEUTROPHILS Latest Ref Range: 40 - 73 % 87 (H) LYMPHOCYTES Latest Ref Range: 21 - 52 % 7 (L) MONOCYTES Latest Ref Range: 3 - 10 % 6 EOSINOPHILS Latest Ref Range: 0 - 5 % 0  
BASOPHILS Latest Ref Range: 0 - 2 % 0  
DF Latest Units:   AUTOMATED  
ABS. NEUTROPHILS Latest Ref Range: 1.8 - 8.0 K/UL 10.5 (H)  
ABS. LYMPHOCYTES Latest Ref Range: 0.9 - 3.6 K/UL 0.9 ABS. MONOCYTES Latest Ref Range: 0.05 - 1.2 K/UL 0.7 ABS. EOSINOPHILS Latest Ref Range: 0.0 - 0.4 K/UL 0.0  
ABS. BASOPHILS Latest Ref Range: 0.0 - 0.1 K/UL 0.0 DIET:  [] Renal    [] Other     [x] NPO     []  Diabetic PRIMARY NURSE REPORT: First initial/Last name/Title Pre Dialysis: KAPIL [address] [facility] [address] RUPINDER    Time: 1128 EDUCATION:   
[x] Patient [x] Other  daughter      Knowledge Basis: []None []Minimal [x] Substantial  
Barriers to learning  []N/A [x] Access Care     [] S&S of infection     [x] Fluid Management     []K+     [x]Procedural   
[]Albumin     [] Medications     [] Tx Options     [] Transplant     [] Diet     [] Other Teaching Tools:  [x] Explain  [] Demo  [] Handouts [] Video Patient response:   [x] Verbalized understanding  [] Teach back  [] Return demonstration [] Requires follow up Inappropriate due to   Patient is intubated and sedated. Daughter at bedside. [x] Time Out/Safety Check  [x]Extracorporeal Circuit Tested for integrity RO/HEMODIALYSIS MACHINE SAFETY CHECKS  Before each treatment:    
Machine Number:                   [facility]  
                                [] Unit Machine #  with centralized RO 
                                [] Portable Machine #1/RO serial # [id number] [] Portable Machine #2/RO serial # [id number] [] Portable Machine #4/RO serial # [id number] [facility] 
                                [] Portable Machine #11/RO serial # [id number] [] Portable Machine #12/RO serial # [id number] [] Portable Machine #13/RO serial #  [id number] Alarm Test:  Pass time 1954         Other:        
[] RO/Machine Log Complete Temp    35.5 Dialysate: pH  7.4 Conductivity: Meter   13.9     HD Machine   14.2                  TCD: 14 
Dialyzer Lot # [id number]            Blood Tubing Lot # 18 [id number]          Saline Lot #  -[id number]  
 
CHLORINE TESTING-Before each treatment and every 4 hours Total Chlorine: [x] less than 0.1 ppm  Time: 2019 4 Hr/2nd Check Time: 2226  
(if greater than 0.1 ppm from Primary then every 30 minutes from Secondary) TREATMENT INITIATION  with Dialysis Precautions:  
[x] All Connections Secured                 [x] Saline Line Double Clamped  
[x] Venous Parameters Set                  [x] Arterial Parameters Set [x] Prime Given 250 ml                          [x]Air Foam Detector Engaged Treatment Initiation Note:Patient stable to begin dialysis. Her LUE AVG was cannulated without incident, and her treatment started. She has family at her bedside. During Treatment Notes:  Patient tolerating treatment well. No titration of pressor medication necessary. Medication Dose Volume Route Initials Dialyzer Cleared: [] Good [] Fair  [x] Poor (yellow colored dialyzer at the end of treatment) Blood processed:  68.9 L 
UF Removed  495 Ml Post Wt:     kg 
POst BP:   133/60       Pulse: 70      Respirations: 17  Temperature: 97.9 Post Tx Vascular Access: AVF/AVG: Bleeding stopped Art 5 min. Osmany. [address] Catheter:  N/A Post Assessment:  
  
                              Skin:  [] Warm  [] Dry [] Diaphoretic    [] Flushed  [] Pale [] Cyanotic [facility] Signatures Title Initials  Time Lungs: [x] Clear    [] Course  [] Crackles  [] Wheezing [] Diminished Evelena Galla RN SH  Cardiac: [x] Regular   [] Irregular   [] Monitor  [] N/A  Rhythm:      
    Edema:  [x] None    [] General     [] Facial   [] Pedal    [] UE    [] LE  
 Pain: [x]0  []1  []2   []3  []4   []5   []6   []7   []8   []9   []10 Post Treatment Note: 
 
 Patient tolerated treatment well. Her blood was returned at the end of treatment. The needles were removed from her LUE AVG. Her venous insertion site bleed for over 10 minutes. Once the bleeding was stopped, the insertion sites were covered with gauze and tape. POST TREATMENT PRIMARY NURSE HANDOFF REPORT:  
 
First initial/Last name/Title Post Dialysis: Andrew Vela RN Time:  1239 Abbreviations: AVG-arterial venous graft, AVF-arterial venous fistula, IJ-Internal Jugular, Subcl-Subclavian, Fem-Femoral, Tx-treatment, AP/HR-apical heart rate, DFR-dialysate flow rate, BFR-blood flow rate, AP-arterial pressure, -venous pressure, UF-ultrafiltrate, TMP-transmembrane pressure, Osmany-Venous, Art-Arterial, RO-Reverse Osmosis

## 2019-02-14 NOTE — PROGRESS NOTES
Bedside shift report received from Dony Watts RN to include SBAR, Kardex, IV drip rate verification & skin check. 0830 Assisted in [place] insertion attempts. Unsuccessful per [address], NP Rounds made with MD. Nepro to be started at 10 cc/hr. Q6 hr blood sugars with insulin coverage. Hgb A1C ordered. [id number] Family at bedside, updates given, questions answered. 1500  Nepro started at 10 cc/hr, No water flushes or increase in rate at this time. Hgb A1C drawn & sent Levophed @ 10 mics,  Precidex @ 0.8 mics vitals stable. Patient resting in bed 
1940 Bedside shift report given to [address]  RN to include SBAR, Kardex, IV drip rate verification & skin check

## 2019-02-14 NOTE — PROGRESS NOTES
VENTILATOR Care plan 
 
Problem: Ventilator Management Goal: *Adequate oxygenation/ ventilation/ and extubation Patient: 
   
 
Lester Cavanaugh     46 y.o.   female     2/14/2019  8:10 AM 
Patient Active Problem List  
Diagnosis Code  Metabolic encephalopathy J17.06  
 UTI (urinary tract infection) N39.0  Cirrhosis ([facility] [address] K74.60  ESRD (end stage renal disease) on dialysis ([facility]) N18.6, Z99.2  COPD (chronic obstructive pulmonary disease) ([facility]) J44.9  Hyperbilirubinemia E80.6  Combined systolic and diastolic heart failure ([facility]) I50.40  Cholestatic jaundice R17  
 SBP [facility]ont[facility] K65.2  Hypotension I95.9  
 
 
UTI (urinary tract infection) [N39.0] Metabolic encephalopathy [E14.81] Reason patient intubated: AMS Ventilator day: 2 Ventilator settings: ACVC+ 14 / 400 / +5 / 35%. ETT Size/Placement: 7.5 - 22 at the lip. ABG: 
Date:2/14/2019 Lab Results Component Value Date/Time PHI 7.615 (HH) 02/14/2019 05:06 AM  
 PCO2I 24.8 (L) 02/14/2019 05:06 AM  
 PO2I 125 (H) 02/14/2019 05:06 AM  
 HCO3I 25.2 02/14/2019 05:06 AM  
 FIO2I 35 02/14/2019 05:06 AM  
 
 
Chest X-ray: 
Date:2/14/2019 Results from [facility] Encounter encounter on 02/08/19 XR CHEST PORT Narrative EXAM: AP view of chest 
  
CLINICAL INDICATION/HISTORY: Intubation 
   --Additional history: None. COMPARISON: 02/08/2019 TECHNIQUE: Frontal view of the chest 
 
_______________ FINDINGS: 
 
SUPPORT DEVICES: Interval intubation with tip of the ET tube approximately 5 cm 
above the sheryl. There is a right IJ central venous catheter with tip 
projecting over the high right atrium. HEART AND MEDIASTINUM: Patient is rotated to the left. Cardiac silhouette is 
enlarged. Stable mediastinal contours . Stable prominence of the pulmonary 
arterial vasculature. LUNGS AND PLEURAL SPACES: No focal airspace opacity. Sharp costophrenic sulci. No pneumothoraces. BONY THORAX AND SOFT TISSUES: Unremarkable. 
 
_______________ Impression IMPRESSION:  
 
1. Support lines and tubes as detailed above. 2. No acute cardiopulmonary findings. Lab Test: 
Date:2/14/2019 WBC:  
Lab Results Component Value Date/Time WBC 11.8 02/14/2019 03:45 AM  
HGB:  
Lab Results Component Value Date/Time HGB 7.7 (L) 02/14/2019 03:45 AM  
 PLTS:  
Lab Results Component Value Date/Time PLATELET 75 (L) 47/17/5726 03:45 AM  
 
 
SaO2%/flow: @RVTZTYU7(4)@ Vital Signs:    
Patient Vitals for the past 8 hrs: 
 Temp Pulse Resp BP SpO2  
02/14/19 0807  75 19  100 % 02/14/19 0730 99.4 °F (37.4 °C) 75 19 121/53 100 % 02/14/19 0700 99.1 °F (37.3 °C) 75 19 121/51 100 % 02/14/19 0630 98.8 °F (37.1 °C) 76 20 126/54 100 % 02/14/19 0600 98.7 °F (37.1 °C) 76 19 119/55 100 % 02/14/19 0530 98.5 °F (36.9 °C) 76 19 118/59 100 % 02/14/19 0500 98.3 °F (36.8 °C) 73 18 117/57 100 % 02/14/19 0430 98 °F (36.7 °C) 72 14 103/44 100 % 02/14/19 0429  72 14  100 % 02/14/19 0400 97.8 °F (36.6 °C) 70 14 97/42 100 % 02/14/19 0330 97.7 °F (36.5 °C) 72 17 96/68 100 % 02/14/19 0300 97.5 °F (36.4 °C) 70 15 103/47 100 % 02/14/19 0230 97.5 °F (36.4 °C) 69 17 101/49 100 % 02/14/19 0200 97.8 °F (36.6 °C) 68 19 112/52 100 % 02/14/19 0130 98.4 °F (36.9 °C) 69 23 108/78 99 % 02/14/19 0100 98.6 °F (37 °C) 71 18 128/57 99 % 02/14/19 0045 98.4 °F (36.9 °C) 71 18 129/58 99 % 02/14/19 0030 98.2 °F (36.8 °C) 71 18 130/61 99 % 02/14/19 0015 97.9 °F (36.6 °C) 70 17 133/60 99 % Wean Screen Pass (Yes or No): Wean Screen Reason for Failure: 
Duration of Weaning Trial: 
Additional Comments: PLAN OF CARE: 
  
 
GOAL: 
 
 
OUTCOME:

## 2019-02-14 NOTE — PROGRESS NOTES
Physical Exam  
Eyes: Scleral icterus is present. Periorbital edema present around both eyes Skin:  
 
  
Care assumed and bedside report was given to me, Tiffany Winters RN  ( ICU night shift nurse), by Silviano Kerr RN ( ICU night shift nurse). Report included the following information:  SBAR, kardex, consultations, hemodynamic monitoring, intake/output, MAR, lab results, VS trends, cardiac rhythm noted NSR. Skin, neuro, respiratory status, order verification, and critical IV gtt rate verification has been dually noted and verified at the bedside with:  Silviano Kerr RN                                     
NSICU Nurse's Note: 
2100: Assumed care of pt at this time. Pt is resting, eyes closed, presently tolerating hemodialysis tx at the bedside. Pt has notable generalized weakness with some random movement to all extremities. Pt does require max assist with all ADLs and repositioning. Bed is locked and in lowest position,side rails up x3, exit alarm activated, call bell is in reach. Interventions are ongoing,will continue to monitor. Neurological: as noted in assessment Cardiovascular: NSR on the monitor. Pulmonary: breath sounds diminished, saturations maintained on vent with 35% FiO2 GI/: Abdomen is distended, hypoactive bowel sounds. Pt is NPO. Last BM noted 02/12/2018. Pt is anuric at baseline per hx ESRD. IVF/Critical gtts: IV levophed gtt, IV Precedex gtt. Skin: as noted above 
 
0000: Pt remains NPO. Accucheck noted 74. Hemodialysis completed at this time. Will continue to monitor. 0345: Labs drawn from CVL RIJ, flushed, patent. Pt was suctioned and repositioned. 0445: Pt was given a complete bed bath and linen change. Pt also tolerated scheduled portable CXR at the bedside and ABG without incident. 0600: Pt remains NPO. Accucheck noted 82. Pt was suctioned and repositioned.  
0710: Bedside change of shift report given to: Sylvia Bello RN

## 2019-02-14 NOTE — PROGRESS NOTES
Internal Medicine Progress Note Patient's Name: Krysten Sinclair Admit Date: 2/8/2019 Length of Stay: 6 Assessment/Plan Principal Problem: 
  SBP (spontaneous bacterial peritonitis) ([address] (2/12/2019) Active Problems: 
  ESRD (end stage renal disease) on dialysis ([address] (2/8/2019) Cholestatic jaundice (2/12/2019) UTI (urinary tract infection) (2/8/2019) Metabolic encephalopathy (6/1/1199) Cirrhosis ([address] (2/8/2019) COPD (chronic obstructive pulmonary disease) ([address] (2/8/2019) Hyperbilirubinemia (2/8/2019) Combined systolic and diastolic heart failure ([address] (2/10/2019) Hypotension (2/13/2019) SBP 
- cont rocephin 
- paracentesis done 2/11, cell count profile consistent with bacterial peritonitis ESRD on dialysis 
- nephro consult - appreciate assistance 
- HD on 2/9 
- hold dialysis on 2/12 d/t HOTN - midodrine added 
- dialysis done 2/13, albumin given, no fluid pulled off 
- dialysis planned for 2/15 Cholestatic jaundice 
- CT abd results below - GI consult - appreciate assistance - serologies, paracentesis to eval fluid, start lactulose/rifaximin, vit K x3 days - MRCP w/o contrast, transjugular liver bx ordered - MRCP unable to be done - ERCP done 2/13 - results below, GI recs medical management of decompensated chronic liver disease with cholestasis - cytology? Brushings? UTI 
- iv rocephin x5 days - UCx - negative 
  
Metabolic encephalopathy 
- Head CT - no acute abnormality 
- monitor neuro status - worsened on 2/13 likely secondary to anesthesia - w/d to pain 
  
HF 
- cardio consult - appreciate assistance COPD 
- brovana, pulmicort 
- O2 stable on RA 
 
 
- PT recommending SNF 
- Cont acceptable home medications for chronic conditions  
- DVT protocol I have personally reviewed all pertinent labs and films that have officially resulted over the last 24 hours.  I have personally checked for all pending labs that are awaiting final results. Interval History Alfredo Jaimes is a 46 y.o. female with a PMHx ESRD on dialysis, DM, CAD, COPD, syst/diastolic heart failure, HTN, chronic liver disease who presented to the ED from dialysis due to AMS. History limited 2/2 AMS. ED eval revealed scleral icterus, confusion, leukocytosis, UTI. Hyperbilirubinemia (21.7) and elevated alk phos (571) are similar to recent ED visit at [facility] on 2/1. Review of prior records from [facility] revealed she was supposed to have a percutaneous gallbladder drain placed on 2/4/19. Head CT neg. Pt will be admitted for further evaluation. IV rocephin started. Nephrology consulted - HD on 2/9 as patient missed the prior day. CT abd without contrast ordered, results below. GI consulted - serologies, paracentesis to eval fluid, start lactulose/rifaximin, vit K x3 days, cardio consult. UCx negative. PT recommending SNF. Paracentesis performed 2/11 - removed 100cc dark jed fluid, cell count profile consistent with SBP. Pt already on rocephin. Fluid cx ngtd. Midodrine added for HOTN. MRCP w/o contrast and transjugular liver bx ordered per GI. MRCP unable to be done 2/2 patient not cooperating. ERCP done on 2/13 instead, no CBD stone or stricture seen. Patient transferred to ICU afterwards 2/2 HOTN and need for pressors instead of IVF due to ESRD. ICU team concerned about AMS and pt's ability to protect airway so patient was intubated. Pt's daughter informed of recent events, wishes for patient to remain full code. Subjective Pt s/e @ bedside. Pt on ventilator. Objective Visit Vitals /57 Pulse 77 Temp 100.1 °F (37.8 °C) Resp 22 Ht 5' 5\" (1.651 m) Wt 47.6 kg (104 lb 15 oz) SpO2 100% BMI 17.46 kg/m² Physical Exam: 
General Appearance: NAD, on ventilator HEENT: normocephalic/atraumatic, moist mucus membranes, scleral icterus Neck: No JVD, supple Lungs: diminished sounds CV: RRR, no m/r/g 
 Abdomen: soft, non-tender, distended, normal bowel sounds Extremities: no cyanosis, no peripheral edema Neuro: non-focal 
 
 
Intake and Output: 
Current Shift:  02/14 0701 - 02/14 1900 In: 120 Out: - Last three shifts:  02/12 1901 - 02/14 0700 In: 579 [I.V.:459] Out: 495 Lab/Data Reviewed: 
Procedure: EGD and EUS limited to pancreaticobiliary system. 
  
IMPRESSION:  
1. No esophageal or gastric varices. 2.   Multiple small duodenal ulcers. 3.   Gastritis. 4.   Normal EUS of the pancreas and biliary system. No CBD stone or stricture. 5.   Moderate perihepatic ascites. 
  
RECOMMENDATIONS: 
1. Resume prior diet and medications. 2.   Medical management of her decompensated chronic liver disease with cholestasis. BMP:  
Lab Results Component Value Date/Time  02/14/2019 03:45 AM  
 K 3.5 02/14/2019 03:45 AM  
 CL 99 (L) 02/14/2019 03:45 AM  
 CO2 25 02/14/2019 03:45 AM  
 AGAP 16 02/14/2019 03:45 AM  
 GLU 75 02/14/2019 03:45 AM  
 BUN 14 02/14/2019 03:45 AM  
 CREA 3.16 (H) 02/14/2019 03:45 AM  
 GFRAA 19 (L) 02/14/2019 03:45 AM  
 GFRNA 16 (L) 02/14/2019 03:45 AM  
 
CBC:  
Lab Results Component Value Date/Time WBC 11.8 02/14/2019 03:45 AM  
 HGB 7.7 (L) 02/14/2019 03:45 AM  
 HCT 22.4 (L) 02/14/2019 03:45 AM  
 PLT 75 (L) 02/14/2019 03:45 AM  
 
 
Imaging Reviewed: 
Xr Abd (kub) Result Date: 2/14/2019 HISTORY: -From Provider:og tube -Additional: None Technique: Single portable frontal abdominal radiograph. Comparison study: None. Findings: An enteric tube is in place, tip projecting over expected location of gastric body, side hole projecting over the left upper quadrant. Lung bases: Patchy bilateral parenchymal opacities and enlargement of cardiopericardial silhouette. Please see accompanying chest radiograph. Endotracheal tube and right IJ line in place.  Bowel gas pattern: Gaseous distention of the stomach and central abdominal bowel, not evaluated in detail. . Other/osseous: Unchanged. Impression: 1. OG tube tip projects over gastric body. 2. Additional findings as described above. Preliminary report was provided to the referring clinician by the on-call resident at the time of the examination. No significant discrepancy. Xr Chest [facility] Result Date: 2/14/2019 HISTORY: -From Provider: respiratory failure -Additional: None Technique : AP PORTABLE CHEST Time of study: 5:22 AM Comparison : . Findings:Endotracheal tube tip approximately 1.5 cm above the sheryl, limited in penetration. Right IJ central venous catheter tip projects over inferior right atrium. NG tube tip below left diaphragm out of field of view of the stomach. Please see accompanying abdominal radiographs for findings. Numerous monitoring wires and leads partially obscures visibility. The cardiopericardial silhouette is enlarged. There is mild peribronchial thickening and perihilar vascular prominence. There is streaky atelectasis in the right midlung zone. Impression: 1. Tubes and lines as described. Endotracheal tube tip estimated at 1.5 cm above sheryl. Recommend correlation with ventilatory status and retraction, as appropriate. 2. Right midlung atelectasis. No additional change. Xr Chest [facility] Result Date: 2/13/2019 EXAM: AP view of chest  CLINICAL INDICATION/HISTORY: Intubation    --Additional history: None. COMPARISON: 02/08/2019 TECHNIQUE: Frontal view of the chest _______________ FINDINGS: SUPPORT DEVICES: Interval intubation with tip of the ET tube approximately 5 cm above the sheryl. There is a right IJ central venous catheter with tip projecting over the high right atrium. HEART AND MEDIASTINUM: Patient is rotated to the left. Cardiac silhouette is enlarged. Stable mediastinal contours . Stable prominence of the pulmonary arterial vasculature. LUNGS AND PLEURAL SPACES: No focal airspace opacity. Sharp costophrenic sulci.  No pneumothoraces. BONY THORAX AND SOFT TISSUES: Unremarkable. _______________ IMPRESSION: 1. Support lines and tubes as detailed above. 2. No acute cardiopulmonary findings. Medications Reviewed: 
Current Facility-Administered Medications Medication Dose Route Frequency  insulin lispro (HUMALOG) injection   SubCUTAneous Q6H  
 glucose chewable tablet 16 g  4 Tab Oral PRN  
 glucagon (GLUCAGEN) injection 1 mg  1 mg IntraMUSCular PRN  
 dextrose (D50) infusion 12.5-25 g  25-50 mL IntraVENous PRN  
 dexmedeTOMidine (PRECEDEX) 400 mcg in 0.9% sodium chloride 100 mL infusion  0.2-1 mcg/kg/hr IntraVENous TITRATE  albuterol-ipratropium (DUO-NEB) 2.5 MG-0.5 MG/3 ML  3 mL Nebulization Q4H RT  
 NOREPINephrine (LEVOPHED) 8 mg in 0.9% NS 250ml infusion  2-16 mcg/min IntraVENous TITRATE  epoetin briseida (EPOGEN;PROCRIT) injection 10,000 Units  10,000 Units IntraVENous DIALYSIS MON, WED & FRI  rifAXIMin (XIFAXAN) tablet 550 mg  550 mg Oral BID  midodrine (PROAMITINE) tablet 5 mg  5 mg Oral Q8H  
 lactulose (CHRONULAC) solution 10 g  15 mL Oral TID  cefTRIAXone (ROCEPHIN) 1 g in 0.9% sodium chloride (MBP/ADV) 50 mL MBP  1 g IntraVENous Q24H  B complex-vitamin C-folic acid (NEPHRO-NACHO) 0.8 mg tab  1 Tab Oral DAILY  arformoterol (BROVANA) neb solution 15 mcg  15 mcg Nebulization BID RT And  
 budesonide (PULMICORT) 500 mcg/2 ml nebulizer suspension  500 mcg Nebulization BID RT Vanesa Herzog PA-C [facility] Office:  [phone] Pager: [phone]

## 2019-02-14 NOTE — PROGRESS NOTES
Renal Progress Note Follow up of ESRD Assessment/Plan: 1. ESRD. Dialyzed yesterday with pressors for bp support and with iv albumin. Next dialysis tomorrow. Will try to gradually address volume overload. 2. Hypotension due to sbp/liver failure/sepsis. Continue midodrin/ pressors if needed. 3. Resp failure. 4. Anemia of ckd. Continue greg with dialysis. 5. Secondary hyperparathyroidism of ckd. Continue to hold sensipar due to hypocalcemia. 6. Liver cirrhosis/ascites/sbp. On abx. GI on the case. EGD results noted- duodenal ulcers, no cbd stone. 7. Systolic chf.  
8. UTI. On abx. 9. Altered ms. Subjective: Intubated yesterday after EGD. Unresponsive, on pressors. Dialyzed yesterday, 500 ml pulled. Patient Active Problem List  
Diagnosis Code  Metabolic encephalopathy V12.88  
 UTI (urinary tract infection) N39.0  Cirrhosis ([facility] K74.60  ESRD (end stage renal disease) on dialysis ([facility]) N18.6, Z99.2  COPD (chronic obstructive pulmonary disease) ([facility]) J44.9  Hyperbilirubinemia E80.6  Combined systolic and diastolic heart failure ([facility]) I50.40  Cholestatic jaundice R17  
 SBP (spontaneous bacterial peritonitis) ([facility] K65.2  Hypotension I95.9 Current Facility-Administered Medications Medication Dose Route Frequency Provider Last Rate Last Dose  dexmedeTOMidine (PRECEDEX) 400 mcg in 0.9% sodium chloride 100 mL infusion  0.2-1 mcg/kg/hr IntraVENous TITRATE Noman Asencoi MD 11.1 mL/hr at 02/14/19 1122 0.8 mcg/kg/hr at 02/14/19 1122  albuterol-ipratropium (DUO-NEB) 2.5 MG-0.5 MG/3 ML  3 mL Nebulization Q4H RT Andrea Michaels MD   3 mL at 02/14/19 1122  
 NOREPINephrine (LEVOPHED) 8 mg in 0.9% NS 250ml infusion  2-16 mcg/min IntraVENous TITRATE Bruna Simmons MD 18.8 mL/hr at 02/14/19 0754 10 mcg/min at 02/14/19 0754  epoetin briseida (EPOGEN;PROCRIT) injection 10,000 Units  10,000 Units IntraVENous DIALYSIS KITA WOODSON & Arelis Moore MD   10,000 Units at 02/13/19 2216  rifAXIMin (XIFAXAN) tablet 550 mg  550 mg Oral BID Mich Fuentes MD   550 mg at 02/14/19 0900  
 midodrine (PROAMITINE) tablet 5 mg  5 mg Oral Q8H Bruna Lundy MD   5 mg at 02/14/19 2555  lactulose (CHRONULAC) solution 10 g  15 mL Oral TID Mich Fuentes MD   10 g at 02/14/19 0900  cefTRIAXone (ROCEPHIN) 1 g in 0.9% sodium chloride (MBP/ADV) 50 mL MBP  1 g IntraVENous Q24H Tara Lockwood  mL/hr at 02/13/19 1844 1 g at 02/13/19 1844  B complex-vitamin C-folic acid (NEPHRO-NACHO) 0.8 mg tab  1 Tab Oral DAILY Murray Wang MD   1 Tab at 02/14/19 0900  
 arformoterol (BROVANA) neb solution 15 mcg  15 mcg Nebulization BID RT Murray Wang MD   15 mcg at 02/14/19 9616 And  budesonide (PULMICORT) 500 mcg/2 ml nebulizer suspension  500 mcg Nebulization BID RT Murray Wang MD   500 mcg at 02/14/19 3085 Objective: 
Vitals:  
 02/14/19 1145 02/14/19 1200 02/14/19 1215 02/14/19 1230 BP: 118/54 120/54 124/56 124/56 Pulse: 76 76 75 76 Resp: 20 19 19 19 Temp: 99.7 °F (37.6 °C) 99.8 °F (37.7 °C) 99.8 °F (37.7 °C) 99.8 °F (37.7 °C) TempSrc:      
SpO2: (!) 88% 100% 100% 100% Weight:      
Height:      
 
. Intake/Output Summary (Last 24 hours) at 2/14/2019 1243 Last data filed at 2/14/2019 0700 Gross per 24 hour Intake 579 ml Output 495 ml Net 84 ml Admission weight:Weight: 61.2 kg (135 lb) (02/08/19 0706) Last Weight Metrics: 
Weight Loss Metrics 2/14/2019 10/30/2018 Today's Wt 104 lb 15 oz 139 lb BMI 17.46 kg/m2 23.13 kg/m2 Physical Exam:  
 
General: No acute distress. Neck: no jvd. LUNGS: good air entry, bl exp rhonchi. CVS EXM: S1, S2, no murmur, rub or gallop. Abdomen: Soft, Non Tender, slightly distended, non tense ascites. Lower Extremities: No Edema. Access: lt arm avf, good thrill, dressings intact. Labs: 
CBC w/Diff Recent Labs  
  02/14/19 
0345 02/13/19 
0600 02/12/19 
0600 WBC 11.8 12.1 11.6 RBC 2.89* 2.77* 3.13* HGB 7.7* 7.2* 8.2* HCT 22.4* 21.2* 24.4*  
PLT 75* 58* 74* GRANS 82* 87* 87* LYMPH 11* 7* 9* EOS 1 0 0 Chemistry Recent Labs  
  02/14/19 0345 02/13/19 
0600 02/12/19 
0600 GLU 75 73* 84  136 136  
K 3.5 3.9 4.0  
CL 99* 95* 95* CO2 25 24 28 BUN 14 38* 34* CREA 3.16* 6.50* 5.86* CA 8.4* 7.6* 7.5* AGAP 16 17 13 BUCR 4* 6* 6* AP  --  540* 746* TP  --  5.7* 5.3* ALB 2.1* 2.4* 1.3*  
GLOB  --  3.3 4.0 AGRAT  --  0.7* 0.3* PHOS 2.7  --   --   
  
 
 
Lab Results Component Value Date/Time Iron 67 02/09/2019 05:48 AM  
 TIBC 143 (L) 02/09/2019 05:48 AM  
 Iron % saturation 47 02/09/2019 05:48 AM  
  
Lab Results Component Value Date/Time Calcium 8.4 (L) 02/14/2019 03:45 AM  
 CALCIUM,IONIZED 4.50 10/30/2018 11:15 AM  
 Phosphorus 2.7 02/14/2019 03:45 AM  
  
 
 
Nataliya Killian M.D [facility] Office [phone] Pager [phone]

## 2019-02-14 NOTE — PROGRESS NOTES
[facility] ICU Progress Note Name: Camryn Finley : 1970 MRN: [id number] Date: 2019 1:14 PM 
  
[x]I have reviewed the flowsheet and previous days notes. Events overnight reviewed and discussed with nursing staff. Vital signs and records reviewed. HPI 
45 y/o female w/ PMHX of ESRD on HD, COPD, CHF and chronic liver disease initially admitted for altered mental status 19. Per chart review, pt was unresponsive yesterday, went for MRCP, became hypotensive, unable to tolerate HD and was sent to ICU for management of pressor. Pt was emergently intubated and CVL placed in ICU. Subjective: 
19 Remains intubated, sedated Remains on pressor Pt withdrawing to pain S/p HD ~500 mL off 19 Start trickle feeds [x]The patient is unable to give any meaningful history or review of systems because the patient is: 
[x]Intubated [x]Sedated  
[]Unresponsive [x]The patient is critically ill on     
[x]Mechanical ventilation [x]Pressors []BiPAP [] ROS:A comprehensive review of systems was negative except for that written in the HPI. Medication Review: · Pressors - Levo · Sedation - Precedex · Antibiotics - Rocephin · Pain - not an issue · GI/ DVT - SCDs · Others (other gtts) Safety Bundles: VAP Bundle/ Severe Sepsis Protocol/ Electrolyte Replacement Protocol Vital Signs:   
Visit Vitals /56 Pulse 76 Temp 99.8 °F (37.7 °C) Resp 19 Ht 5' 5\" (1.651 m) Wt 47.6 kg (104 lb 15 oz) SpO2 100% BMI 17.46 kg/m² O2 Device: Endotracheal tube, Ventilator O2 Flow Rate (L/min): 8 l/min Temp (24hrs), Av.1 °F (36.7 °C), Min:94.6 °F (34.8 °C), Max:100.1 °F (37.8 °C) Intake/Output:  
Last shift:       07 - 1900 In: 120 Out: - Last 3 shifts: 1901 -  0700 In: 579 [I.V.:459] Out: 495 Intake/Output Summary (Last 24 hours) at 2019 2760 Last data filed at 2/14/2019 0800 Gross per 24 hour Intake 699 ml Output 495 ml Net 204 ml Ventilator Settings: 
Ventilator Mode: Assist control, VC+ Respiratory Rate Back-Up Rate: 14 Insp Time (sec): 1 sec I:E Ratio: 1:4.11 Ventilator Volumes Vt Set (ml): 400 ml Vt Exhaled (Machine Breath) (ml): 444 ml Ve Observed (l/min): 6.7 l/min Ventilator Pressures PIP Observed (cm H2O): 21 cm H2O Plateau Pressure (cm H2O): 17 cm H2O 
MAP (cm H2O): 8.9 PEEP/VENT (cm H2O): 5 cm H20 Physical Exam: 
 
General: Intubated/sedated; HEENT:  Jaundice sclerae; pink palpebral conjunctivae; mucosa moist 
Resp:  Coarse bilaterally to auscultation; Diminished in bases; Symmetrical chest expansion, no accessory muscle use; good airway entry; no rales/ wheezing/ rhonchi noted CV:  S1, S2 present; regular rate and rhythm; NSR 
GI:  Abdomen soft, distended; non-tender; (+) active bowel sounds Extremities:  +2 pulses on all extremities; no edema/ cyanosis/ clubbing noted; normal capillary refill Skin:  Warm; no rashes/ lesions noted Neurologic:  Non-focal; withdraws to pain Devices:  ETT, CVL, HD, OGT 
 
DATA:  
 
Current Facility-Administered Medications Medication Dose Route Frequency  dexmedeTOMidine (PRECEDEX) 400 mcg in 0.9% sodium chloride 100 mL infusion  0.2-1 mcg/kg/hr IntraVENous TITRATE  albuterol-ipratropium (DUO-NEB) 2.5 MG-0.5 MG/3 ML  3 mL Nebulization Q4H RT  
 NOREPINephrine (LEVOPHED) 8 mg in 0.9% NS 250ml infusion  2-16 mcg/min IntraVENous TITRATE  epoetin briseida (EPOGEN;PROCRIT) injection 10,000 Units  10,000 Units IntraVENous DIALYSIS MON, WED & FRI  rifAXIMin (XIFAXAN) tablet 550 mg  550 mg Oral BID  midodrine (PROAMITINE) tablet 5 mg  5 mg Oral Q8H  
 lactulose (CHRONULAC) solution 10 g  15 mL Oral TID  cefTRIAXone (ROCEPHIN) 1 g in 0.9% sodium chloride (MBP/ADV) 50 mL MBP  1 g IntraVENous Q24H  B complex-vitamin C-folic acid (NEPHRO-NACHO) 0.8 mg tab  1 Tab Oral DAILY  arformoterol (BROVANA) neb solution 15 mcg  15 mcg Nebulization BID RT And  
 budesonide (PULMICORT) 500 mcg/2 ml nebulizer suspension  500 mcg Nebulization BID RT Labs: Results:  
   
Chemistry Recent Labs  
  02/14/19 0345 02/13/19 
0600 02/12/19 
0600 GLU 75 73* 84  136 136  
K 3.5 3.9 4.0  
CL 99* 95* 95* CO2 25 24 28 BUN 14 38* 34* CREA 3.16* 6.50* 5.86* CA 8.4* 7.6* 7.5* AGAP 16 17 13 BUCR 4* 6* 6* AP  --  540* 746* TP  --  5.7* 5.3* ALB 2.1* 2.4* 1.3*  
GLOB  --  3.3 4.0 AGRAT  --  0.7* 0.3* CBC w/Diff Recent Labs  
  02/14/19 0345 02/13/19 
0600 02/12/19 
0600 WBC 11.8 12.1 11.6 RBC 2.89* 2.77* 3.13* HGB 7.7* 7.2* 8.2* HCT 22.4* 21.2* 24.4*  
PLT 75* 58* 74* GRANS 82* 87* 87* LYMPH 11* 7* 9* EOS 1 0 0 Coagulation Recent Labs  
  02/14/19 0345 02/13/19 
0600 PTP 18.3* 18.8* INR 1.5* 1.6* Liver Enzymes Recent Labs  
  02/14/19 0345 02/13/19 
0600 TP  --  5.7* ALB 2.1* 2.4* AP  --  540* SGOT  --  34 ABG Lab Results Component Value Date/Time PHI 7.615 (HH) 02/14/2019 05:06 AM  
 PCO2I 24.8 (L) 02/14/2019 05:06 AM  
 PO2I 125 (H) 02/14/2019 05:06 AM  
 HCO3I 25.2 02/14/2019 05:06 AM  
 FIO2I 35 02/14/2019 05:06 AM  
  
Microbiology Recent Labs  
  02/11/19 [address] CULT NO GROWTH 3 DAYS Telemetry: [x]Sinus []A-flutter []Paced []A-fib []Multiple PVCs Imaging: CXR Results  (Last 48 hours) 02/14/19 0554  XR CHEST PORT Final result Impression:  Impression: 1. Tubes and lines as described. Endotracheal tube tip estimated at 1.5 cm above  
sheryl. Recommend correlation with ventilatory status and retraction, as  
appropriate. 2. Right midlung atelectasis. No additional change. Narrative:  HISTORY:   
-From Provider: respiratory failure  
-Additional: None Technique : AP PORTABLE CHEST Time of study: 5:22 AM   
   
Comparison : . Findings:Endotracheal tube tip approximately 1.5 cm above the sheryl, limited in  
penetration. Right IJ central venous catheter tip projects over inferior right  
atrium. NG tube tip below left diaphragm out of field of view of the stomach. Please see accompanying abdominal radiographs for findings. Numerous monitoring  
wires and leads partially obscures visibility. The cardiopericardial silhouette is enlarged. There is mild peribronchial  
thickening and perihilar vascular prominence. There is streaky atelectasis in  
the right midlung zone. 02/13/19 1740  XR CHEST PORT Final result Impression:  IMPRESSION:   
   
1. Support lines and tubes as detailed above. 2. No acute cardiopulmonary findings. Narrative:  EXAM: AP view of chest  
   
CLINICAL INDICATION/HISTORY: Intubation  
   --Additional history: None. COMPARISON: 02/08/2019 TECHNIQUE: Frontal view of the chest  
   
_______________ FINDINGS:  
   
SUPPORT DEVICES: Interval intubation with tip of the ET tube approximately 5 cm  
above the sheryl. There is a right IJ central venous catheter with tip  
projecting over the high right atrium. HEART AND MEDIASTINUM: Patient is rotated to the left. Cardiac silhouette is  
enlarged. Stable mediastinal contours . Stable prominence of the pulmonary  
arterial vasculature. LUNGS AND PLEURAL SPACES: No focal airspace opacity. Sharp costophrenic sulci. No pneumothoraces. BONY THORAX AND SOFT TISSUES: Unremarkable.  
   
_______________ CT Results  (Last 48 hours) None IMPRESSION:  
· Acute respiratory failure requiring mechanical ventilation · Shock- on Levo · End stage liver disease · Spontaneous Bacterial Peritonitis- on Ceftriaxone · ESRD · Encephalopathy- on Rifaxamin · CHF · UTI · Thrombocytopenia · COPD Hx 
· CAD · COPD · DM 
· ESRD 
· HLD 
· HTN 
· S/p heart valve repair PLAN:  
 · Resp -  VAP bundle. Wean vent settings as tolerated. HOB > 30. Aspiration precautions. Daily ABG and CXR while intubated. Daily sedation vacation and SBT. Duo-nebs, Brovana, Pulmicort. · ID - Sepsis bundle. Afebrile and aleukocytosis. Ceftriaxone for SBP. · CVS - BP requiring pressor. MAP > 60. Midodrine. Monitor HD status. · Heme/onc - Thrombocytopenia- improving. Daily CBC. Monitor H/H and platelets. · Metabolic - Daily BMP. Monitor electrolytes. · Renal - Trend renal indices. Anuric. HD tomorrow. Procrit, Nephro-briseida. Nephrology following. · Endocrine - SSI, BS Q6 HR. · Neuro/ Pain/ Sedation - Monitor neuro status. RASS 0 - (-1). · GI - Start trickle feeds. Rifaxmin, Lactulose. GI following. · Prophylaxis - DVT, GI 
· Discussed in interdisciplinary rounds · Code status- FULL The patient is: [x] acutely ill Risk of deterioration: [] moderate  
 [] critically ill  [x] high  
 
[x]See my orders for details My assessment/plan was discussed with: 
[x]nursing []PT/OT [x]respiratory therapy [x]Dr. Francis Hernandez  
[]family [] [x]Total critical care time exclusive of procedures   40    minutes Terry Leos NP

## 2019-02-14 NOTE — CONSULTS
PCCM Night Coverage    ABGs called by RRT  Overventilated  MV Settings given  ABGs f/u ordered in 1 h  Reported to ICU NP

## 2019-02-15 ENCOUNTER — APPOINTMENT (OUTPATIENT)
Dept: GENERAL RADIOLOGY | Age: 49
DRG: 871 | End: 2019-02-15
Attending: INTERNAL MEDICINE
Payer: MEDICARE

## 2019-02-15 PROBLEM — J96.00 ACUTE RESPIRATORY FAILURE (HCC): Status: ACTIVE | Noted: 2019-02-15

## 2019-02-15 PROBLEM — D63.8 ANEMIA, CHRONIC DISEASE: Status: ACTIVE | Noted: 2019-02-15

## 2019-02-15 PROBLEM — D68.9 COAGULOPATHY (HCC): Status: ACTIVE | Noted: 2019-02-15

## 2019-02-15 PROBLEM — R57.9 SHOCK (HCC): Status: ACTIVE | Noted: 2019-02-15

## 2019-02-15 LAB
ALBUMIN SERPL-MCNC: 1.8 G/DL (ref 3.4–5)
ALBUMIN SERPL-MCNC: 1.8 G/DL (ref 3.4–5)
ALBUMIN/GLOB SERPL: 0.5 {RATIO} (ref 0.8–1.7)
ALP SERPL-CCNC: 467 U/L (ref 45–117)
ALT SERPL-CCNC: 25 U/L (ref 13–56)
ANION GAP SERPL CALC-SCNC: 22 MMOL/L (ref 3–18)
ARTERIAL PATENCY WRIST A: ABNORMAL
AST SERPL-CCNC: 29 U/L (ref 15–37)
BASE DEFICIT BLD-SCNC: 7 MMOL/L
BASOPHILS # BLD: 0.1 K/UL (ref 0–0.1)
BASOPHILS NFR BLD: 1 % (ref 0–2)
BDY SITE: ABNORMAL
BILIRUB DIRECT SERPL-MCNC: 11.2 MG/DL (ref 0–0.2)
BILIRUB SERPL-MCNC: 14.8 MG/DL (ref 0.2–1)
BODY TEMPERATURE: 99.5
BUN SERPL-MCNC: 22 MG/DL (ref 7–18)
BUN/CREAT SERPL: 5 (ref 12–20)
CALCIUM SERPL-MCNC: 8 MG/DL (ref 8.5–10.1)
CHLORIDE SERPL-SCNC: 100 MMOL/L (ref 100–108)
CO2 SERPL-SCNC: 16 MMOL/L (ref 21–32)
CREAT SERPL-MCNC: 4.31 MG/DL (ref 0.6–1.3)
DIFFERENTIAL METHOD BLD: ABNORMAL
EOSINOPHIL # BLD: 0.1 K/UL (ref 0–0.4)
EOSINOPHIL NFR BLD: 1 % (ref 0–5)
ERYTHROCYTE [DISTWIDTH] IN BLOOD BY AUTOMATED COUNT: 26.6 % (ref 11.6–14.5)
GAS FLOW.O2 O2 DELIVERY SYS: ABNORMAL L/MIN
GAS FLOW.O2 SETTING OXYMISER: 14 BPM
GLOBULIN SER CALC-MCNC: 3.6 G/DL (ref 2–4)
GLUCOSE BLD STRIP.AUTO-MCNC: 100 MG/DL (ref 70–110)
GLUCOSE BLD STRIP.AUTO-MCNC: 101 MG/DL (ref 70–110)
GLUCOSE BLD STRIP.AUTO-MCNC: 115 MG/DL (ref 70–110)
GLUCOSE BLD STRIP.AUTO-MCNC: 118 MG/DL (ref 70–110)
GLUCOSE SERPL-MCNC: 104 MG/DL (ref 74–99)
HCO3 BLD-SCNC: 16 MMOL/L (ref 22–26)
HCT VFR BLD AUTO: 21.5 % (ref 35–45)
HGB BLD-MCNC: 7.2 G/DL (ref 12–16)
INR PPP: 1.6 (ref 0.8–1.2)
INSPIRATION.DURATION SETTING TIME VENT: 1 SEC
LACTATE SERPL-SCNC: 1 MMOL/L (ref 0.4–2)
LYMPHOCYTES # BLD: 1.3 K/UL (ref 0.9–3.6)
LYMPHOCYTES NFR BLD: 14 % (ref 21–52)
MCH RBC QN AUTO: 26.2 PG (ref 24–34)
MCHC RBC AUTO-ENTMCNC: 33.5 G/DL (ref 31–37)
MCV RBC AUTO: 78.2 FL (ref 74–97)
MONOCYTES # BLD: 0.8 K/UL (ref 0.05–1.2)
MONOCYTES NFR BLD: 8 % (ref 3–10)
NEUTS SEG # BLD: 7.1 K/UL (ref 1.8–8)
NEUTS SEG NFR BLD: 76 % (ref 40–73)
O2/TOTAL GAS SETTING VFR VENT: 35 %
PCO2 BLD: 20.2 MMHG (ref 35–45)
PEEP RESPIRATORY: 5 CMH2O
PH BLD: 7.51 [PH] (ref 7.35–7.45)
PHOSPHATE SERPL-MCNC: 3.1 MG/DL (ref 2.5–4.9)
PIP ISTAT,IPIP: 26
PLATELET # BLD AUTO: 85 K/UL (ref 135–420)
PO2 BLD: 60 MMHG (ref 80–100)
POTASSIUM SERPL-SCNC: 3.7 MMOL/L (ref 3.5–5.5)
PROT SERPL-MCNC: 5.4 G/DL (ref 6.4–8.2)
PROTHROMBIN TIME: 18.4 SEC (ref 11.5–15.2)
RBC # BLD AUTO: 2.75 M/UL (ref 4.2–5.3)
SAO2 % BLD: 93 % (ref 92–97)
SERVICE CMNT-IMP: ABNORMAL
SODIUM SERPL-SCNC: 138 MMOL/L (ref 136–145)
SPECIMEN TYPE: ABNORMAL
TOTAL RESP. RATE, ITRR: 21
VENTILATION MODE VENT: ABNORMAL
VOLUME CONTROL PLUS IVLCP: YES
VT SETTING VENT: 400 ML
WBC # BLD AUTO: 9.4 K/UL (ref 4.6–13.2)

## 2019-02-15 PROCEDURE — 85025 COMPLETE CBC W/AUTO DIFF WBC: CPT

## 2019-02-15 PROCEDURE — 36600 WITHDRAWAL OF ARTERIAL BLOOD: CPT

## 2019-02-15 PROCEDURE — 74011000250 HC RX REV CODE- 250: Performed by: INTERNAL MEDICINE

## 2019-02-15 PROCEDURE — 74011000258 HC RX REV CODE- 258: Performed by: INTERNAL MEDICINE

## 2019-02-15 PROCEDURE — 80076 HEPATIC FUNCTION PANEL: CPT

## 2019-02-15 PROCEDURE — 74011250637 HC RX REV CODE- 250/637: Performed by: INTERNAL MEDICINE

## 2019-02-15 PROCEDURE — 74018 RADEX ABDOMEN 1 VIEW: CPT

## 2019-02-15 PROCEDURE — 94003 VENT MGMT INPAT SUBQ DAY: CPT

## 2019-02-15 PROCEDURE — 85610 PROTHROMBIN TIME: CPT

## 2019-02-15 PROCEDURE — 82803 BLOOD GASES ANY COMBINATION: CPT

## 2019-02-15 PROCEDURE — 74011250637 HC RX REV CODE- 250/637: Performed by: HOSPITALIST

## 2019-02-15 PROCEDURE — 77030018846 HC SOL IRR STRL H20 ICUM -A

## 2019-02-15 PROCEDURE — 83605 ASSAY OF LACTIC ACID: CPT

## 2019-02-15 PROCEDURE — 80069 RENAL FUNCTION PANEL: CPT

## 2019-02-15 PROCEDURE — 74011250636 HC RX REV CODE- 250/636: Performed by: INTERNAL MEDICINE

## 2019-02-15 PROCEDURE — 71045 X-RAY EXAM CHEST 1 VIEW: CPT

## 2019-02-15 PROCEDURE — 94640 AIRWAY INHALATION TREATMENT: CPT

## 2019-02-15 PROCEDURE — 65610000006 HC RM INTENSIVE CARE

## 2019-02-15 PROCEDURE — 77030032490 HC SLV COMPR SCD KNE COVD -B

## 2019-02-15 PROCEDURE — 90935 HEMODIALYSIS ONE EVALUATION: CPT

## 2019-02-15 PROCEDURE — 74011250636 HC RX REV CODE- 250/636: Performed by: NURSE PRACTITIONER

## 2019-02-15 PROCEDURE — 82962 GLUCOSE BLOOD TEST: CPT

## 2019-02-15 PROCEDURE — 94762 N-INVAS EAR/PLS OXIMTRY CONT: CPT

## 2019-02-15 PROCEDURE — C9113 INJ PANTOPRAZOLE SODIUM, VIA: HCPCS | Performed by: NURSE PRACTITIONER

## 2019-02-15 PROCEDURE — 74011000250 HC RX REV CODE- 250: Performed by: HOSPITALIST

## 2019-02-15 RX ADMIN — NOREPINEPHRINE BITARTRATE 6 MCG/MIN: 1 INJECTION INTRAVENOUS at 07:30

## 2019-02-15 RX ADMIN — IPRATROPIUM BROMIDE AND ALBUTEROL SULFATE 3 ML: .5; 3 SOLUTION RESPIRATORY (INHALATION) at 20:37

## 2019-02-15 RX ADMIN — BUDESONIDE 500 MCG: 0.5 INHALANT RESPIRATORY (INHALATION) at 20:23

## 2019-02-15 RX ADMIN — ARFORMOTEROL TARTRATE 15 MCG: 15 SOLUTION RESPIRATORY (INHALATION) at 20:23

## 2019-02-15 RX ADMIN — IPRATROPIUM BROMIDE AND ALBUTEROL SULFATE 3 ML: .5; 3 SOLUTION RESPIRATORY (INHALATION) at 00:14

## 2019-02-15 RX ADMIN — ARFORMOTEROL TARTRATE 15 MCG: 15 SOLUTION RESPIRATORY (INHALATION) at 08:13

## 2019-02-15 RX ADMIN — LACTULOSE 10 G: 20 SOLUTION ORAL at 21:00

## 2019-02-15 RX ADMIN — IPRATROPIUM BROMIDE AND ALBUTEROL SULFATE 3 ML: .5; 3 SOLUTION RESPIRATORY (INHALATION) at 08:13

## 2019-02-15 RX ADMIN — IPRATROPIUM BROMIDE AND ALBUTEROL SULFATE 3 ML: .5; 3 SOLUTION RESPIRATORY (INHALATION) at 12:12

## 2019-02-15 RX ADMIN — LACTULOSE 10 G: 20 SOLUTION ORAL at 01:00

## 2019-02-15 RX ADMIN — SODIUM CHLORIDE 40 MG: 9 INJECTION, SOLUTION INTRAMUSCULAR; INTRAVENOUS; SUBCUTANEOUS at 16:40

## 2019-02-15 RX ADMIN — ERYTHROPOIETIN 10000 UNITS: 10000 INJECTION, SOLUTION INTRAVENOUS; SUBCUTANEOUS at 14:39

## 2019-02-15 RX ADMIN — MIDODRINE HYDROCHLORIDE 5 MG: 2.5 TABLET ORAL at 21:00

## 2019-02-15 RX ADMIN — MIDODRINE HYDROCHLORIDE 5 MG: 2.5 TABLET ORAL at 16:40

## 2019-02-15 RX ADMIN — SODIUM CHLORIDE 0.8 MCG/KG/HR: 900 INJECTION, SOLUTION INTRAVENOUS at 16:59

## 2019-02-15 RX ADMIN — BUDESONIDE 500 MCG: 0.5 INHALANT RESPIRATORY (INHALATION) at 08:13

## 2019-02-15 RX ADMIN — IPRATROPIUM BROMIDE AND ALBUTEROL SULFATE 3 ML: .5; 3 SOLUTION RESPIRATORY (INHALATION) at 15:50

## 2019-02-15 RX ADMIN — NOREPINEPHRINE BITARTRATE 6 MCG/MIN: 1 INJECTION INTRAVENOUS at 07:19

## 2019-02-15 RX ADMIN — RIFAXIMIN 550 MG: 550 TABLET ORAL at 08:44

## 2019-02-15 RX ADMIN — LACTULOSE 10 G: 20 SOLUTION ORAL at 16:42

## 2019-02-15 RX ADMIN — Medication 1 TABLET: at 08:44

## 2019-02-15 RX ADMIN — SODIUM CHLORIDE 0.8 MCG/KG/HR: 900 INJECTION, SOLUTION INTRAVENOUS at 07:01

## 2019-02-15 RX ADMIN — SODIUM CHLORIDE 0.8 MCG/KG/HR: 900 INJECTION, SOLUTION INTRAVENOUS at 07:18

## 2019-02-15 RX ADMIN — MIDODRINE HYDROCHLORIDE 5 MG: 2.5 TABLET ORAL at 06:39

## 2019-02-15 RX ADMIN — ERYTHROPOIETIN 10000 UNITS: 10000 INJECTION, SOLUTION INTRAVENOUS; SUBCUTANEOUS at 19:00

## 2019-02-15 RX ADMIN — CEFTRIAXONE 1 G: 1 INJECTION, POWDER, FOR SOLUTION INTRAMUSCULAR; INTRAVENOUS at 17:00

## 2019-02-15 RX ADMIN — IPRATROPIUM BROMIDE AND ALBUTEROL SULFATE 3 ML: .5; 3 SOLUTION RESPIRATORY (INHALATION) at 04:27

## 2019-02-15 RX ADMIN — RIFAXIMIN 550 MG: 550 TABLET ORAL at 21:00

## 2019-02-15 NOTE — PROGRESS NOTES
Internal Medicine Progress Note Patient's Name: Nemo Crowe Admit Date: 2/8/2019 Length of Stay: 7 Assessment/Plan Principal Problem: 
  SBP (spontaneous bacterial peritonitis) ([address] (2/12/2019) Active Problems: Metabolic encephalopathy (9/5/6133) UTI (urinary tract infection) (2/8/2019) Cirrhosis ([address] (2/8/2019) ESRD (end stage renal disease) on dialysis ([address] (2/8/2019) COPD (chronic obstructive pulmonary disease) ([address] (2/8/2019) Hyperbilirubinemia (2/8/2019) Combined systolic and diastolic heart failure ([address] (2/10/2019) Cholestatic jaundice (2/12/2019) Hypotension (2/13/2019) Anemia, chronic disease (2/15/2019) Coagulopathy ([address] (2/15/2019) Acute respiratory failure ([address] (2/15/2019) SBP 
- cont rocephin 
- paracentesis done 2/11, cell count profile consistent with bacterial peritonitis 
  
ESRD on dialysis 
- nephro consult - appreciate assistance 
- HD on 2/9 
- hold dialysis on 2/12 d/t HOTN - midodrine added 
- dialysis done 2/13, albumin given, no fluid pulled off 
- dialysis today Cholestatic jaundice 
- CT abd results below - GI consult - appreciate assistance - serologies, paracentesis to eval fluid, start lactulose/rifaximin, vit K x3 days - MRCP w/o contrast, transjugular liver bx ordered - MRCP unable to be done - ERCP done 2/13 - results below, GI recs medical management of decompensated chronic liver disease with cholestasis - cytology? Brushings? 
  
UTI 
- iv rocephin x5 days - UCx - negative 
  
Metabolic encephalopathy 
- Head CT - no acute abnormality 
- monitor neuro status - worsened on 2/13 likely secondary to anesthesia - w/d to pain 
  
HF 
- cardio consult - appreciate assistance 
  
COPD 
- brovana, pulmicort 
- O2 stable on RA Acute Resp failure 
-Intubated 
-Sedated Shock 
-Levophed 
 
- Cont acceptable home medications for chronic conditions  
- DVT protocol I have personally reviewed all pertinent labs and films that have officially resulted over the last 24 hours. I have personally checked for all pending labs that are awaiting final results. Interval History Sacha Res a 46 y.o. female with a PMHx ESRD on dialysis, DM, CAD, COPD, syst/diastolic heart failure, HTN, chronic liver disease who presented to the ED from dialysis due to AMS. History limited 2/2 AMS. ED eval revealed scleral icterus, confusion, leukocytosis, UTI. Hyperbilirubinemia (21.7) and elevated alk phos (571) are similar to recent ED visit at [facility] 2/1. Review of prior records from [facility] revealed she was supposed to have a percutaneous gallbladder drain placed on 2/4/19. Head CT neg. Pt will be admitted for further evaluation. IV rocephin started. Nephrology consulted - HD on 2/9 as patient missed the prior day. CT abd without contrast ordered, results below. GI consulted - serologies, paracentesis to eval fluid, start lactulose/rifaximin, vit K x3 days, cardio consult. UCx negative. PT recommending SNF. Paracentesis performed 2/11 - removed 100cc dark jed fluid, cell count profile consistent with SBP. Pt already on rocephin. Fluid cx ngtd. Midodrine added for HOTN. MRCP w/o contrast and transjugular liver bx ordered per GI. MRCP unable to be done 2/2 patient not cooperating. ERCP done on 2/13 instead, no CBD stone or stricture seen. Patient transferred to ICU afterwards 2/2 HOTN and need for pressors instead of IVF due to ESRD. ICU team concerned about AMS and pt's ability to protect airway so patient was intubated. Pt's daughter informed of recent events, wishes for patient to remain full code. Dialysis 2/15 Subjective Pt s/e @ bedside. Pt on vent Objective Visit Vitals /51 Pulse 60 Temp 95.5 °F (35.3 °C) Resp 14 Ht 5' 5\" (1.651 m) Wt 52.6 kg (115 lb 15.4 oz) SpO2 100% BMI 19.30 kg/m² Physical Exam: General Appearance: sedated, intubated HENT: normocephalic/atraumatic, dry mucus membranes Neck: No JVD, supple Lungs: CTA with normal respiratory effort CV: RRR, no m/r/g Abdomen: soft, non-tender, normal bowel sounds Extremities: no cyanosis, no peripheral edema Neuro: No focal deficits, moves extremities, opens eyes, does not follow commands Skin: Normal color, intact Intake and Output: 
Current Shift:  No intake/output data recorded. Last three shifts:  02/13 1901 - 02/15 0700 In: 5358 [I.V.:1028] Out: 495 Lab/Data Reviewed: All lab results for the last 24 hours reviewed. Imaging Reviewed: 
Xr Abd (kub) Result Date: 2/15/2019 Abdomen, single view COMPARISON: 2/13/2019 INDICATION: Ileus FINDINGS: Supine AP portable view the abdomen obtained. Nasogastric tube projects over the stomach. Mild gaseous distention of small and large bowel noted no gross free intraperitoneal gas given limitations of AP supine technique. No acute osseous abnormality. Impression: 1. Nasogastric tube in expected position. 2. Bowel gas pattern compatible with the provided history of ileus. Xr Chest [place] Result Date: 2/15/2019 Chest, single view Indication: Respiratory failure. Comparison: Several prior chest radiographs, most recently 2/14/2019 Findings:  Portable upright AP view of the chest was obtained. Patient is rotated on the provided projection, with mild foreshortening of the right hemithorax. Endotracheal tube present with tip approximately 2.5 cm above the sheryl. Nasogastric tube below the diaphragm, within the stomach. Esophageal temperature monitoring probe with tip at the distal GE junction. Right IJ approach central venous catheter with tip projecting over the right atrium, slightly medial position accentuated by rotation. Lung volumes are low. No focal pneumonic consolidation, pneumothorax, or pleural effusion.  Cardiac size and mediastinal contours are stable, with enlargement of the cardiac silhouette redemonstrated. No acute osseous abnormality. Impression: 1. ET tube, nasogastric tube, and right IJ approach central venous catheter as above. Interval retraction of the right IJ catheter noted. 2. Low lung volumes and cardiomegaly similar to prior, without superimposed acute radiographic abnormality. Medications Reviewed: 
Current Facility-Administered Medications Medication Dose Route Frequency  pantoprazole (PROTONIX) 40 mg in sodium chloride 0.9% 10 mL injection  40 mg IntraVENous DAILY  insulin lispro (HUMALOG) injection   SubCUTAneous Q6H  
 glucose chewable tablet 16 g  4 Tab Oral PRN  
 glucagon (GLUCAGEN) injection 1 mg  1 mg IntraMUSCular PRN  
 dextrose (D50) infusion 12.5-25 g  25-50 mL IntraVENous PRN  
 dexmedeTOMidine (PRECEDEX) 400 mcg in 0.9% sodium chloride 100 mL infusion  0.2-0.7 mcg/kg/hr IntraVENous TITRATE  albuterol-ipratropium (DUO-NEB) 2.5 MG-0.5 MG/3 ML  3 mL Nebulization Q4H RT  
 NOREPINephrine (LEVOPHED) 8 mg in 0.9% NS 250ml infusion  2-16 mcg/min IntraVENous TITRATE  epoetin briseida (EPOGEN;PROCRIT) injection 10,000 Units  10,000 Units IntraVENous DIALYSIS MON, WED & FRI  rifAXIMin (XIFAXAN) tablet 550 mg  550 mg Oral BID  midodrine (PROAMITINE) tablet 5 mg  5 mg Oral Q8H  
 lactulose (CHRONULAC) solution 10 g  15 mL Oral TID  cefTRIAXone (ROCEPHIN) 1 g in 0.9% sodium chloride (MBP/ADV) 50 mL MBP  1 g IntraVENous Q24H  B complex-vitamin C-folic acid (NEPHRO-NACHO) 0.8 mg tab  1 Tab Oral DAILY  arformoterol (BROVANA) neb solution 15 mcg  15 mcg Nebulization BID RT And  
 budesonide (PULMICORT) 500 mcg/2 ml nebulizer suspension  500 mcg Nebulization BID RT Moustapha Nayak PA-C [facility] Pager: [phone] Office: [phone]

## 2019-02-15 NOTE — PROGRESS NOTES
Renal Progress Note Follow up of ESRD Assessment/Plan: 1. ESRD. Dialysis today with pressors for bp support. Will try to gradually address volume overload. 2. Hypotension due to sbp/liver failure/sepsis. Continue midodrin/ pressors. 3. HAGMA, new onset. Check lactate. 4. Resp failure. 5. Anemia of ckd. Continue greg with dialysis. 6. Secondary hyperparathyroidism of ckd. Continue to hold sensipar due to hypocalcemia. 7 Liver cirrhosis/ascites/sbp. On abx. GI on the case. EGD results noted- duodenal ulcers, no cbd stone. 8. Systolic chf.  
9. UTI. On abx. 10. Altered ms. More alert today. Subjective: 
Sedated but opens eye, follows simple commands. On levophed. TF is on hold due to high residuals. Patient Active Problem List  
Diagnosis Code  Metabolic encephalopathy D23.18  
 UTI (urinary tract infection) N39.0  Cirrhosis ([facility] K74.60  ESRD (end stage renal disease) on dialysis ([facility]) N18.6, Z99.2  COPD (chronic obstructive pulmonary disease) ([facility]) J44.9  Hyperbilirubinemia E80.6  Combined systolic and diastolic heart failure ([facility]) I50.40  Cholestatic jaundice R17  
 SBP (spontaneous bacterial peritonitis) ([facility] K65.2  Hypotension I95.9  Anemia, chronic disease D63.8  Coagulopathy ([facility] D68.9 Current Facility-Administered Medications Medication Dose Route Frequency Provider Last Rate Last Dose  insulin lispro (HUMALOG) injection   SubCUTAneous Q6H Rebecca Shaikh NP   Stopped at 02/14/19 1400  
 glucose chewable tablet 16 g  4 Tab Oral PRN Uzair Landaverde NP      
 glucagon (GLUCAGEN) injection 1 mg  1 mg IntraMUSCular PRN Wesly LAKE NP      
 dextrose (D50) infusion 12.5-25 g  25-50 mL IntraVENous PRN Rebecca Ferrer NP      
 dexmedeTOMidine (PRECEDEX) 400 mcg in 0.9% sodium chloride 100 mL infusion  0.2-0.7 mcg/kg/hr IntraVENous TITRATE Sobeida Michaels MD 9.5 mL/hr at 02/15/19 0718 0.8 mcg/kg/hr at 02/15/19 9813  albuterol-ipratropium (DUO-NEB) 2.5 MG-0.5 MG/3 ML  3 mL Nebulization Q4H RT Andrea Michaels MD   3 mL at 02/15/19 0813  
 NOREPINephrine (LEVOPHED) 8 mg in 0.9% NS 250ml infusion  2-16 mcg/min IntraVENous TITRATE Tobias LAKE MD 11.3 mL/hr at 02/15/19 0730 6 mcg/min at 02/15/19 0730  epoetin briseida (EPOGEN;PROCRIT) injection 10,000 Units  10,000 Units IntraVENous DIALYSIS [facility]ODSON & Smith Lewis MD   10,000 Units at 02/13/19 2216  rifAXIMin (XIFAXAN) tablet 550 mg  550 mg Oral BID Darshana Haddad MD   550 mg at 02/15/19 0844  
 midodrine (PROAMITINE) tablet 5 mg  5 mg Oral Q8H Anuel Zimmerman MD   5 mg at 02/15/19 0639  
 lactulose (CHRONULAC) solution 10 g  15 mL Oral TID Darshana Haddad MD   10 g at 02/15/19 0100  cefTRIAXone (ROCEPHIN) 1 g in 0.9% sodium chloride (MBP/ADV) 50 mL MBP  1 g IntraVENous Q24H Tobias LAKE  mL/hr at 02/14/19 1752 1 g at 02/14/19 1752  B complex-vitamin C-folic acid (NEPHRO-NACHO) 0.8 mg tab  1 Tab Oral DAILY Son Crowell MD   1 Tab at 02/15/19 0844  
 arformoterol (BROVANA) neb solution 15 mcg  15 mcg Nebulization BID RT Son Crowell MD   15 mcg at 02/15/19 1799 And  budesonide (PULMICORT) 500 mcg/2 ml nebulizer suspension  500 mcg Nebulization BID RT Son Crowell MD   500 mcg at 02/15/19 8995 Objective: 
Vitals:  
 02/15/19 0500 02/15/19 0600 02/15/19 0700 02/15/19 0817 BP: 109/53 104/49 109/49 Pulse: 67 68 65 65 Resp: 21 19 21 20 Temp: 99 °F (37.2 °C) 99.6 °F (37.6 °C) 98.2 °F (36.8 °C) TempSrc:      
SpO2: 100% 100% 100% 100% Weight:  52.6 kg (115 lb 15.4 oz) Height:      
 
. Intake/Output Summary (Last 24 hours) at 2/15/2019 1018 Last data filed at 2/15/2019 0700 Gross per 24 hour Intake 948.99 ml Output  Net 948.99 ml Admission weight:Weight: 61.2 kg (135 lb) (02/08/19 0706) Last Weight Metrics: Weight Loss Metrics 2/15/2019 10/30/2018 Today's Wt 115 lb 15.4 oz 139 lb BMI 19.3 kg/m2 23.13 kg/m2 Physical Exam:  
 
General: intubated. Sclerae icteric. ET tube in place. Neck: no jvd. LUNGS: good air entry, bl exp rhonchi. CVS EXM: S1, S2, no murmur, rub or gallop. Abdomen: Soft, Non Tender, moderately distended, non tense ascites. Lower Extremities: No Edema. Access: lt arm avf, good thrill, dressings intact. Labs: CBC w/Diff Recent Labs  
  02/15/19 
0350 02/14/19 
0345 02/13/19 
0600 WBC 9.4 11.8 12.1 RBC 2.75* 2.89* 2.77* HGB 7.2* 7.7* 7.2* HCT 21.5* 22.4* 21.2*  
PLT 85* 75* 58* GRANS 76* 82* 87* LYMPH 14* 11* 7* EOS 1 1 0 Chemistry Recent Labs  
  02/15/19 
0350 02/14/19 0345 02/13/19 
0600 * 75  --  73*  140  --  136  
K 3.7 3.5  --  3.9  99*  --  95* CO2 16* 25  --  24 BUN 22* 14  --  38* CREA 4.31* 3.16*  --  6.50* CA 8.0* 8.4*  --  7.6* AGAP 22* 16  --  17  
BUCR 5* 4*  --  6* *  --   --  540* TP 5.4*  --   --  5.7* ALB 1.8*  1.8* 2.1*  --  2.4*  
GLOB 3.6  --   --  3.3 AGRAT 0.5*  --   --  0.7* PHOS 3.1 2.7   < >  --   
 < > = values in this interval not displayed. Lab Results Component Value Date/Time Iron 67 02/09/2019 05:48 AM  
 TIBC 143 (L) 02/09/2019 05:48 AM  
 Iron % saturation 47 02/09/2019 05:48 AM  
  
Lab Results Component Value Date/Time Calcium 8.0 (L) 02/15/2019 03:50 AM  
 CALCIUM,IONIZED 4.50 10/30/2018 11:15 AM  
 Phosphorus 3.1 02/15/2019 03:50 AM  
  
 
 
Yossi Lopez M.D [facility] Office [phone] Pager [phone]

## 2019-02-15 NOTE — PROGRESS NOTES
2030,received pt on vent support with head elevated sxn for small amount of thick yellow secretions ETT moved to left of mouth and secured,BVM at bedside inline resp neb given.

## 2019-02-15 NOTE — PROGRESS NOTES
1900: Received pt from off going nurse Mauricio [facility], pt resting in bed quietly, bed rails raised x3, bed in lowest position, VSS. Drips verified at this time. Will continue to monitor. 2100: Pt's xdueslgfcmf=223.7. Ice packs applied. 0300: Pt's temperature=95.7. Bear hugger applied. 0700: Bedside and Verbal shift change report given to [address] (oncoming nurse) by Matt Bear 
 (offgoing nurse). Report included the following information SBAR, Kardex, ED Summary, Intake/Output, Recent Results, Med Rec Status, Cardiac Rhythm NSR w/ 1st degree block and Alarm Parameters .

## 2019-02-15 NOTE — PROGRESS NOTES
Problem: Pressure Injury - Risk of 
Goal: *Prevention of pressure injury Document Honorio Scale and appropriate interventions in the flowsheet. Outcome: Progressing Towards Goal 
Pressure Injury Interventions: 
Sensory Interventions: Assess changes in LOC, Keep linens dry and wrinkle-free, Minimize linen layers Moisture Interventions: Absorbent underpads, Minimize layers Activity Interventions: Pressure redistribution bed/mattress(bed type) Mobility Interventions: Pressure redistribution bed/mattress (bed type) Nutrition Interventions: Document food/fluid/supplement intake Friction and Shear Interventions: HOB 30 degrees or less, Lift sheet Problem: Falls - Risk of 
Goal: *Absence of Falls Document Clover Thao Fall Risk and appropriate interventions in the flowsheet. Outcome: Progressing Towards Goal 
Fall Risk Interventions: 
Mobility Interventions: Bed/chair exit alarm Mentation Interventions: Adequate sleep, hydration, pain control, Bed/chair exit alarm Medication Interventions: Bed/chair exit alarm Elimination Interventions: Call light in reach, Bed/chair exit alarm History of Falls Interventions: Bed/chair exit alarm, Evaluate medications/consider consulting pharmacy

## 2019-02-15 NOTE — PROGRESS NOTES
[facility] ICU Progress Note Name: Alejo Lundy : 1970 MRN: [id number] Date: 2/15/2019 1:14 PM 
  
[x]I have reviewed the flowsheet and previous days notes. Events overnight reviewed and discussed with nursing staff. Vital signs and records reviewed. HPI 
45 y/o female w/ PMHX of ESRD on HD, COPD, CHF and chronic liver disease initially admitted for altered mental status 19. Per chart review, pt was unresponsive yesterday, went for MRCP, became hypotensive, unable to tolerate HD and was sent to ICU for management of pressor. Pt was emergently intubated and CVL placed in ICU. Subjective: 
2/15/19 Remains intubated, sedated Remains on pressor Pt nodding appropriately, follows minimal commands HD today KUB showed moderate gas in small and large bowel- OGT to suction 
 
 
[x]The patient is unable to give any meaningful history or review of systems because the patient is: 
[x]Intubated [x]Sedated  
[]Unresponsive [x]The patient is critically ill on     
[x]Mechanical ventilation [x]Pressors []BiPAP [] ROS:A comprehensive review of systems was negative except for that written in the HPI. Medication Review: · Pressors - Levo · Sedation - Precedex · Antibiotics - Rocephin · Pain - not an issue · GI/ DVT - SCDs/Protonix · Others (other gtts) Safety Bundles: VAP Bundle/ Severe Sepsis Protocol/ Electrolyte Replacement Protocol Vital Signs:   
Visit Vitals /49 Pulse 65 Temp 98.2 °F (36.8 °C) Resp 20 Ht 5' 5\" (1.651 m) Wt 52.6 kg (115 lb 15.4 oz) SpO2 100% BMI 19.30 kg/m² O2 Device: Endotracheal tube, Ventilator O2 Flow Rate (L/min): 8 l/min Temp (24hrs), Av.3 °F (37.4 °C), Min:95.7 °F (35.4 °C), Max:100.9 °F (38.3 °C) Intake/Output:  
Last shift:      No intake/output data recorded. Last 3 shifts:  1901 - 02/15 0700 In: 6871 [I.V.:1028] Out: 495 Intake/Output Summary (Last 24 hours) at 2/15/2019 1052 Last data filed at 2/15/2019 0700 Gross per 24 hour Intake 948.99 ml Output  Net 948.99 ml Ventilator Settings: 
Ventilator Mode: Assist control, VC+ Respiratory Rate Back-Up Rate: 14 Insp Time (sec): 1 sec I:E Ratio: 1:1.9 Ventilator Volumes Vt Set (ml): 400 ml Vt Exhaled (Machine Breath) (ml): 444 ml Ve Observed (l/min): 9.04 l/min Ventilator Pressures PIP Observed (cm H2O): 25 cm H2O Plateau Pressure (cm H2O): 19 cm H2O 
MAP (cm H2O): 12 PEEP/VENT (cm H2O): 5 cm H20 Physical Exam: 
 
General: Intubated/sedated; HEENT:  Jaundice sclerae; pink palpebral conjunctivae; mucosa moist 
Resp:  Coarse bilaterally to auscultation; Diminished in bases; Symmetrical chest expansion, no accessory muscle use; good airway entry; no rales/ wheezing/ rhonchi noted CV:  S1, S2 present; regular rate and rhythm; NSR; diastolic murmur GI:  Abdomen soft, distended; non-tender; (+) active bowel sounds Extremities:  +2 pulses on all extremities; no edema/ cyanosis/ clubbing noted; normal capillary refill Skin:  Warm; no rashes/ lesions noted Neurologic:  Non-focal; withdraws to pain; follows minimal simple commands Devices:  ETT, CVL, HD, OGT 
 
DATA:  
 
Current Facility-Administered Medications Medication Dose Route Frequency  insulin lispro (HUMALOG) injection   SubCUTAneous Q6H  
 glucose chewable tablet 16 g  4 Tab Oral PRN  
 glucagon (GLUCAGEN) injection 1 mg  1 mg IntraMUSCular PRN  
 dextrose (D50) infusion 12.5-25 g  25-50 mL IntraVENous PRN  
 dexmedeTOMidine (PRECEDEX) 400 mcg in 0.9% sodium chloride 100 mL infusion  0.2-0.7 mcg/kg/hr IntraVENous TITRATE  albuterol-ipratropium (DUO-NEB) 2.5 MG-0.5 MG/3 ML  3 mL Nebulization Q4H RT  
 NOREPINephrine (LEVOPHED) 8 mg in 0.9% NS 250ml infusion  2-16 mcg/min IntraVENous TITRATE  epoetin briseida (EPOGEN;PROCRIT) injection 10,000 Units  10,000 Units IntraVENous DIALYSIS MON, WED & FRI  rifAXIMin (XIFAXAN) tablet 550 mg  550 mg Oral BID  midodrine (PROAMITINE) tablet 5 mg  5 mg Oral Q8H  
 lactulose (CHRONULAC) solution 10 g  15 mL Oral TID  cefTRIAXone (ROCEPHIN) 1 g in 0.9% sodium chloride (MBP/ADV) 50 mL MBP  1 g IntraVENous Q24H  B complex-vitamin C-folic acid (NEPHRO-NACHO) 0.8 mg tab  1 Tab Oral DAILY  arformoterol (BROVANA) neb solution 15 mcg  15 mcg Nebulization BID RT And  
 budesonide (PULMICORT) 500 mcg/2 ml nebulizer suspension  500 mcg Nebulization BID RT Labs: Results:  
   
Chemistry Recent Labs  
  02/15/19 
0350 02/14/19 
0345 02/13/19 
0600 * 75 73*  140 136  
K 3.7 3.5 3.9  99* 95* CO2 16* 25 24 BUN 22* 14 38* CREA 4.31* 3.16* 6.50* CA 8.0* 8.4* 7.6* AGAP 22* 16 17 BUCR 5* 4* 6* *  --  540* TP 5.4*  --  5.7* ALB 1.8*  1.8* 2.1* 2.4*  
GLOB 3.6  --  3.3 AGRAT 0.5*  --  0.7* CBC w/Diff Recent Labs  
  02/15/19 
0350 02/14/19 
0345 02/13/19 
0600 WBC 9.4 11.8 12.1 RBC 2.75* 2.89* 2.77* HGB 7.2* 7.7* 7.2* HCT 21.5* 22.4* 21.2*  
PLT 85* 75* 58* GRANS 76* 82* 87* LYMPH 14* 11* 7* EOS 1 1 0 Coagulation Recent Labs  
  02/15/19 
0350 02/14/19 
0345 PTP 18.4* 18.3* INR 1.6* 1.5* Liver Enzymes Recent Labs  
  02/15/19 
0350 TP 5.4* ALB 1.8*  1.8* * SGOT 29 ABG Lab Results Component Value Date/Time PHI 7.507 (H) 02/15/2019 05:47 AM  
 PCO2I 20.2 (L) 02/15/2019 05:47 AM  
 PO2I 60 (L) 02/15/2019 05:47 AM  
 HCO3I 16.0 (L) 02/15/2019 05:47 AM  
 FIO2I 35 02/15/2019 05:47 AM  
  
Microbiology No results for input(s): CULT in the last 72 hours. Telemetry: [x]Sinus []A-flutter []Paced []A-fib []Multiple PVCs Imaging: CXR Results  (Last 48 hours) 02/15/19 0610  XR CHEST PORT Final result Impression:  Impression: 1. ET tube, nasogastric tube, and right IJ approach central venous catheter as  
above. Interval retraction of the right IJ catheter noted. 2. Low lung volumes and cardiomegaly similar to prior, without superimposed  
acute radiographic abnormality. Narrative:  Chest, single view Indication: Respiratory failure. Comparison: Several prior chest radiographs, most recently 2/14/2019 Findings:  Portable upright AP view of the chest was obtained. Patient is  
rotated on the provided projection, with mild foreshortening of the right  
hemithorax. Endotracheal tube present with tip approximately 2.5 cm above the  
sheryl. Nasogastric tube below the diaphragm, within the stomach. Esophageal  
temperature monitoring probe with tip at the distal GE junction. Right IJ  
approach central venous catheter with tip projecting over the right atrium,  
slightly medial position accentuated by rotation. Lung volumes are low. No focal  
pneumonic consolidation, pneumothorax, or pleural effusion. Cardiac size and  
mediastinal contours are stable, with enlargement of the cardiac silhouette  
redemonstrated. No acute osseous abnormality. 02/14/19 0554  XR CHEST PORT Final result Impression:  Impression: 1. Tubes and lines as described. Endotracheal tube tip estimated at 1.5 cm above  
sheryl. Recommend correlation with ventilatory status and retraction, as  
appropriate. 2. Right midlung atelectasis. No additional change. Narrative:  HISTORY:   
-From Provider: respiratory failure  
-Additional: None Technique : AP PORTABLE CHEST Time of study: 5:22 AM   
   
Comparison : . Findings:Endotracheal tube tip approximately 1.5 cm above the sheryl, limited in  
penetration. Right IJ central venous catheter tip projects over inferior right  
atrium. NG tube tip below left diaphragm out of field of view of the stomach. Please see accompanying abdominal radiographs for findings. Numerous monitoring  
wires and leads partially obscures visibility. The cardiopericardial silhouette is enlarged. There is mild peribronchial  
thickening and perihilar vascular prominence. There is streaky atelectasis in  
the right midlung zone. 02/13/19 1740  XR CHEST PORT Final result Impression:  IMPRESSION:   
   
1. Support lines and tubes as detailed above. 2. No acute cardiopulmonary findings. Narrative:  EXAM: AP view of chest  
   
CLINICAL INDICATION/HISTORY: Intubation  
   --Additional history: None. COMPARISON: 02/08/2019 TECHNIQUE: Frontal view of the chest  
   
_______________ FINDINGS:  
   
SUPPORT DEVICES: Interval intubation with tip of the ET tube approximately 5 cm  
above the sheryl. There is a right IJ central venous catheter with tip  
projecting over the high right atrium. HEART AND MEDIASTINUM: Patient is rotated to the left. Cardiac silhouette is  
enlarged. Stable mediastinal contours . Stable prominence of the pulmonary  
arterial vasculature. LUNGS AND PLEURAL SPACES: No focal airspace opacity. Sharp costophrenic sulci. No pneumothoraces. BONY THORAX AND SOFT TISSUES: Unremarkable.  
   
_______________ CT Results  (Last 48 hours) None IMPRESSION:  
· Acute respiratory failure requiring mechanical ventilation · Shock- on Levo · End stage liver disease · Spontaneous Bacterial Peritonitis- on Ceftriaxone · ESRD · Encephalopathy- on Rifaxamin · CHF · UTI · Thrombocytopenia- improving · COPD Hx 
· CAD · COPD · DM 
· ESRD 
· HLD 
· HTN 
· S/p heart valve repair PLAN:  
· Resp -  VAP bundle. Wean vent settings as tolerated. HOB > 30. Aspiration precautions. Daily ABG and CXR while intubated. Daily sedation vacation and SBT. Demaro-nebRadha mendez Pulgraysonort. · ID - Sepsis bundle. Afebrile and aleukocytosis. Ceftriaxone for SBP. · CVS - BP requiring pressor. MAP > 60. Midodrine. Monitor HD status. · Heme/onc - Thrombocytopenia- improving. Daily CBC. Monitor H/H and platelets. · Metabolic - Daily BMP. Monitor electrolytes. · Renal - Trend renal indices. Anuric. HD today. Procrit, Nephro-briseida. Nephrology following. · Endocrine - SSI, BS Q6 HR. · Neuro/ Pain/ Sedation - Monitor neuro status. RASS 0 - (-1). · GI - Hold tube feeds. OGT to intermittent suction. Rifaxmin, Lactulose. GI following. · Prophylaxis - DVT, GI- protonix, SCDs · Discussed in interdisciplinary rounds · Code status- FULL The patient is: [x] acutely ill Risk of deterioration: [] moderate  
 [] critically ill  [x] high  
 
[x]See my orders for details My assessment/plan was discussed with: 
[x]nursing []PT/OT [x]respiratory therapy [x]Dr. Gavi Gray  
[]family [] [x]Total critical care time exclusive of procedures   40    minutes Josh Hassan NP

## 2019-02-15 NOTE — DIABETES MGMT
Nutrition RE-assessment/Plan of care RECOMMENDATIONS:  
1. When able to resume tube feeding, suggest Nepro starting with trickle feeds of 10 ml/hr (432 calories, 19 grams protein, 170 ml free water/day. 2.  When able to advance tube feeding, suggest final goal rate of 40 ml/hr. This will provide 1728 calories, 76 grams protein, ~ 0.7 L free water/day, closely meeting estimated energy and protein needs. 3.  Monitor TF, labs and weights. GOALS:  
1. Tube feeding will meet >75% of protein/calorie needs by 2/17 2. Weight Maintenance (+/- 1-2 lb) by  2/24 ASSESSMENT: 
Wt status is classified as normal per BMI of 20.2. However per documented weight records Pt w/ weight loss equating to 18 lb or 13% x ~3 months. History of poor PO intake. Labs noted. Pt w/ hypoalbuminemia, elevate Bili (21.7), elevated Alk Phos (571) and elevated BUN/Cr; GFR (13). Tube feeding on hold due to KUB results. Nutrition Diagnoses:  
Unintentional weight loss related to inadequate energy intake PTA as evidenced by an 18 lb or 13% x ~3 months per documented weight records. Inadequate energy intake PTA related to decreased appetite as evidenced by diet recall over the past couple of months. Inadequate oral food and beverage intake due to intubation as evidenced by NPO orders. Meets Criteria for Chronic Malnutrition  
[x] Severe Malnutrition, as evidenced by: 
 [x] Severe muscle wasting, loss of subcutaneous fat 
 [x] Nutritional intake of <75% of recommended intake for >1 month 
 [x] Weight loss of  >5% in 1 month, >7.5% in 3 months, >10% in 6 months, >20% in 1 year 
 [] Severe edema Nutrition Risk:  [x] High  [] Moderate []  Low SUBJECTIVE/OBJECTIVE: 
Pt admitted for UTI/metabolic encephalopathy. Pt w/ ESRD on HD. Pt w/ jaundice eyes and elevated hepatic panel. Noted per documented weight records Pt w/ weight loss equating to 18 lb or 13% x ~3 months.  Pt appears thin with noted muscle wasting and SQ fat loss of extremities. Per diet/weight history obtained by RD on 2/10/19 (prior to intubation) pt reported poor appetite for the past 2 months and minimal PO intake. 2/14/19:  Now intubated. 2/15/19:  TF on hold - OGT to suction. Information Obtained from:   
 [x] Chart Review 
 [] Patient 
 [] Family/Caregiver 
 [] Nurse/Physician 
 [x] Interdisciplinary Meeting/Rounds Diet: NPO - pt was previously receiving trickle feeds via OGT of Nepro at10 ml/hr but TFon hold (KUB showed moderate gas in small and large bowel and OGT now to suction) Medications: [x] Reviewed Lactulose,  Nephro-Mejia, Allergies: [x] Reviewed Encounter Diagnoses ICD-10-CM ICD-9-CM 1. Urinary tract infection without hematuria, site unspecified N39.0 599.0 2. Hyperbilirubinemia E80.6 782.4 3. Confusion R41.0 298.9 4. Hemodialysis patient ([facility] 75.) Z99.2 V45.11 Labs:   
Lab Results Component Value Date/Time Sodium 138 02/15/2019 03:50 AM  
 Potassium 3.7 02/15/2019 03:50 AM  
 Chloride 100 02/15/2019 03:50 AM  
 CO2 16 (L) 02/15/2019 03:50 AM  
 Anion gap 22 (H) 02/15/2019 03:50 AM  
 Glucose 104 (H) 02/15/2019 03:50 AM  
 BUN 22 (H) 02/15/2019 03:50 AM  
 Creatinine 4.31 (H) 02/15/2019 03:50 AM  
 Calcium 8.0 (L) 02/15/2019 03:50 AM  
 Phosphorus 3.1 02/15/2019 03:50 AM  
 Albumin 1.8 (L) 02/15/2019 03:50 AM  
 Albumin 1.8 (L) 02/15/2019 03:50 AM  
 
Lab Results Component Value Date/Time ALT (SGPT) 25 02/15/2019 03:50 AM  
 AST (SGOT) 29 02/15/2019 03:50 AM  
 Alk. phosphatase 467 (H) 02/15/2019 03:50 AM  
 Bilirubin, direct 11.2 (H) 02/15/2019 03:50 AM  
 Bilirubin, total 14.8 (H) 02/15/2019 03:50 AM  
 
Anthropometrics: BMI (calculated): 19.3 Last 3 Recorded Weights in this Encounter 02/13/19 0435 02/14/19 0500 02/15/19 0600 Weight: 55.5 kg (122 lb 5.7 oz) 47.6 kg (104 lb 15 oz) 52.6 kg (115 lb 15.4 oz) Ht Readings from Last 1 Encounters: 02/11/19 5' 5\" (1.651 m) Weight Metrics 2/15/2019 10/30/2018 Weight 115 lb 15.4 oz 139 lb BMI 19.3 kg/m2 23.13 kg/m2 Patient Vitals for the past 100 hrs: 
 % Diet Eaten 02/12/19 1710 0 % 02/12/19 1230 0 % 02/12/19 0930 0 % 02/11/19 1800 10 % IBW: 125 lb %IBW: 97% UBW: 130s lb [x] Weight Loss [] Weight Gain [] Weight Stable Estimated Nutrition Needs: [x] MSJ  [] Other: 
Calories: 6998-8627 kcal Based on:   [x] Actual BW   
Protein:   66-77 g Based on:   [x] Actual BW Fluid:       UO + 1000 ml Based on:   [x] Actual BW  
 
 [x] No Cultural, Scientology or ethnic dietary need identified. [] Cultural, Scientology and ethnic food preferences identified and addressed Wt Status:  [x] Normal (18.6 - 24.9) [] Underweight (<18.5) [] Overweight (25 - 29.9) [] Mild Obesity (30 - 34.9)  [] Moderate Obesity (35 - 39.9) [] Morbid Obesity (40+) Nutrition Problems Identified:  
[] Suboptimal PO intake  
[] Food Allergies [x] Difficulty chewing/swallowing/poor dentition (while receiving po) [] Constipation/Diarrhea  
[] Nausea/Vomiting  
[] None 
[x] Other: intubated/NPO Plan:  
[] Therapeutic Diet 
[]  Obtained/adjusted food preferences/tolerances and/or snacks options  
[]  Supplements added  
[] Occupational therapy following for feeding techniques []  HS snack added  
[]  Modify diet texture  
[]  Modify diet for food allergies []  Educate patient/family once all testing finished 
[]  Assist with menu selection []  Monitor PO intake on meal rounds  
[x]  Continue inpatient monitoring and intervention  
[]  Participated in discharge planning/Interdisciplinary rounds/Team meetings  
[]  Other:  
 
Education Needs: 
 [x] Not appropriate for teaching at this time due to: intubation 
 [] Identified and addressed Nutrition Monitoring and Evaluation: 
[x] Continue ongoing monitoring and intervention 
[] Other Marjan Shelton RD, CDE Office:  [phone] [place] Pager:  [phone]

## 2019-02-15 NOTE — PROGRESS NOTES
47 yo BF with cholestatic jaundice( no biliary obstruction on imaging studies. Per chart review, pt was unresponsive yesterday, went for MRCP, became hypotensive, unable to tolerate HD and was sent to ICU for management of pressor. Pt was emergently intubated and CVL placed in ICU. LFTs better Plan: Continue supportive care

## 2019-02-15 NOTE — DIALYSIS
ACUTE HEMODIALYSIS FLOW SHEET 
 
HEMODIALYSIS ORDERS: Physician: Dr. Enzo Rudolph Dialyzer: revaclear   Duration: 3.5 hr  BFR: 350   DFR: 800 Dialysate:  Temp 36 K+   3    Ca+  3 Na 140 Bicarb 35 Weight:  52.6 kg    Patient Chart []     Unable to Obtain []   Dry weight/UF Goal: 500 ml Access LUE AVG Needle Gauge 15 Heparin []  Bolus      Units    [] Hourly       Units    [x]None Catheter locking solution NA Pre BP:   114/56    Pulse:     61      Respirations: 20 Temperature:   96.1  Tx: NS   250    ml/Bolus  Other        [] N/A Labs: Pre        Post:        [] N/A Additional Orders(medications, blood products, hypotension management):   Epogen    [] N/A [x] [facility] Consent Verified CATHETER ACCESS: [x]N/A   []Right   []Left   []IJ     []Fem   []Chest wall  
[] First use X-ray verified     []Tunnel                [] Non Tunneled []No S/S infection  []Redness  []Drainage []Cultured []Swelling []Pain []Medical Aseptic Prep Utilized   []Dressing Changed  [] Biopatch  Date:      
[]Clotted   []Patent   Flows: []Good  []Poor  []Reversed If access problem,  notified: []Yes    [x]N/A  Date:        
 
GRAFT/FISTULA ACCESS:  []N/A     []Right     [x]Left     [x]UE     []LE [x]AVG   []AVF        []Buttonhole    [x]Medical Aseptic Prep Utilized []No S/S infection  []Redness  []Drainage []Cultured []Swelling []Pain Bruit:   [x] Strong    [] Weak       Thrill :   [x] Strong    [] Weak Needle Gauge: 15   Length: 1 inch If access problem,  notified: []Yes     [x]N/A  Date:       
Please describe access if present and not used:  
 
 
GENERAL ASSESSMENT:   
LUNGS:  Rate 16 SaO2%   99     [] N/A    [x] Clear  [] Coarse  [] Crackles  [] Wheezing 
      [] Diminished     Location : []RLL   []LLL    []RUL  []ADINA Cough: []Productive  []Dry  []N/A   Respirations:  [x]Easy  []Labored Therapy:  []RA  []NC  l/min    Mask: []NRB []Venti       O2% [x]Ventilator  [x]Intubated  [] Trach  [] BiPaP CARDIAC: []Regular      [] Irregular   [] Pericardial Rub  [] JVD []  Monitored  [] Bedside  [] Remotely monitored [] N/A  Rhythm: EDEMA: [] None  []Generalized  [] Pitting [] 1    [] 2    [] 3    [] 4 [] Facial  [] Pedal  []  UE  [] LE  
SKIN:   [x] Warm  [] Hot     [] Cold   [x] Dry     [] Pale   [] Diaphoretic    
             [] Flushed  [] Jaundiced  [] Cyanotic  [] Rash  [] Weeping LOC:    [x] Alert      []Oriented:    [] Person     [] Place  []Time 
             [] Confused  [x] Lethargic  [] Medicated  [] Non-responsive GI / ABDOMEN:  [] Flat    [] Distended    [x] Soft    [] Firm   []  Obese 
                           [] Diarrhea  [] Bowel Sounds  [] Nausea  [] Vomiting  / URINE ASSESSMENT:[] Voiding   [] Oliguria  [] Anuria   []  Reis [] Incontinent    []  Incontinent Brief      []  Bathroom Privileges PAIN: [x] 0 []1  []2   []3   []4   []5   []6   []7   []8   []9   []10 Scale 0-10  Action/Follow Up: Patient is intubated and sedated. MOBILITY:  [] Amb    [] Amb/Assist    [x] Bed    [] Wheelchair  [] Stretcher All Vitals and Treatment Details on Attached Flowsheet Hospital: [facility] Room # 0398 [] 1st Time Acute  [] Stat  [x] Routine  [] Urgent    
[] Acute Room  []  Bedside  [x] ICU/CCU  [] ER Isolation Precautions:  [x] Dialysis   [] Airborne   [] Contact    [] Reverse Special Considerations:         [] Blood Consent Verified [x]N/A ALLERGIES:   [x] NKA Code Status:  [x] Full Code  [] DNR  [] Other HBsAg ONLY: Date Drawn 2/8/2019         [x]Negative []Positive []Unknown HBsAb: Date 2/8/2019    [] Susceptible   [x] [id number] []Not Drawn  [] Drawn Current Labs:    Date of Labs: 2/15/2019          Today [x] Cut and paste current labs here.     Results for Ernesto Luna (MRN [id number]) as of 2/15/2019 12:03 
 Ref. Range 2/15/2019 03:50 WBC Latest Ref Range: 4.6 - 13.2 K/uL 9.4 RBC Latest Ref Range: 4.20 - 5.30 M/uL 2.75 (L) HGB Latest Ref Range: 12.0 - 16.0 g/dL 7.2 (L) HCT Latest Ref Range: 35.0 - 45.0 % 21.5 (L) MCV Latest Ref Range: 74.0 - 97.0 FL 78.2 MCH Latest Ref Range: 24.0 - 34.0 PG 26.2 MCHC Latest Ref Range: 31.0 - 37.0 g/dL 33.5 RDW Latest Ref Range: 11.6 - 14.5 % 26.6 (H) PLATELET Latest Ref Range: 135 - 420 K/uL 85 (L) NEUTROPHILS Latest Ref Range: 40 - 73 % 76 (H) LYMPHOCYTES Latest Ref Range: 21 - 52 % 14 (L) MONOCYTES Latest Ref Range: 3 - 10 % 8 EOSINOPHILS Latest Ref Range: 0 - 5 % 1  
BASOPHILS Latest Ref Range: 0 - 2 % 1 DF Latest Units:   AUTOMATED  
ABS. NEUTROPHILS Latest Ref Range: 1.8 - 8.0 K/UL 7.1 ABS. LYMPHOCYTES Latest Ref Range: 0.9 - 3.6 K/UL 1.3  
ABS. MONOCYTES Latest Ref Range: 0.05 - 1.2 K/UL 0.8 ABS. EOSINOPHILS Latest Ref Range: 0.0 - 0.4 K/UL 0.1 ABS. BASOPHILS Latest Ref Range: 0.0 - 0.1 K/UL 0.1 INR Latest Ref Range: 0.8 - 1.2   1.6 (H) Prothrombin time Latest Ref Range: 11.5 - 15.2 sec 18.4 (H) Sodium Latest Ref Range: 136 - 145 mmol/L 138 Potassium Latest Ref Range: 3.5 - 5.5 mmol/L 3.7 Chloride Latest Ref Range: 100 - 108 mmol/L 100 CO2 Latest Ref Range: 21 - 32 mmol/L 16 (L) Anion gap Latest Ref Range: 3.0 - 18 mmol/L 22 (H) Glucose Latest Ref Range: 74 - 99 mg/dL 104 (H) BUN Latest Ref Range: 7.0 - 18 MG/DL 22 (H) Creatinine Latest Ref Range: 0.6 - 1.3 MG/DL 4.31 (H) BUN/Creatinine ratio Latest Ref Range: 12 - 20   5 (L) Calcium Latest Ref Range: 8.5 - 10.1 MG/DL 8.0 (L) Phosphorus Latest Ref Range: 2.5 - 4.9 MG/DL 3.1 GFR est non-AA Latest Ref Range: >60 ml/min/1.73m2 11 (L)  
GFR est AA Latest Ref Range: >60 ml/min/1.73m2 13 (L) Bilirubin, total Latest Ref Range: 0.2 - 1.0 MG/DL Bilirubin, direct Latest Ref Range: 0.0 - 0.2 MG/DL Protein, total Latest Ref Range: 6.4 - 8.2 g/dL Albumin Latest Ref Range: 3.4 - 5.0 g/dL 1.8 (L) Globulin Latest Ref Range: 2.0 - 4.0 g/dL A-G Ratio Latest Ref Range: 0.8 - 1.7 ALT (SGPT) Latest Ref Range: 13 - 56 U/L   
AST Latest Ref Range: 15 - 37 U/L Alk. phosphatase Latest Ref Range: 45 - 117 U/L   
  
                                                                         
DIET:  [] Renal    [] Other     [x] NPO     []  Diabetic PRIMARY NURSE REPORT: First initial/Last name/Title Pre Dialysis: Kristine Williamson RN    Time: 7718 EDUCATION:   
[x] Patient [] Other         Knowledge Basis: []None []Minimal [] Substantial  
Barriers to learning Patient is intubated and sedated. []N/A  
[] Access Care     [] S&S of infection     [] Fluid Management     []K+     [x]Procedural   
[]Albumin     [] Medications     [] Tx Options     [] Transplant     [] Diet     [] Other Teaching Tools:  [x] Explain  [] Demo  [] Handouts [] Video Patient response:   [] Verbalized understanding  [] Teach back  [] Return demonstration [x] Requires follow up Inappropriate due to     Patient is intubated and sedated. [x] Time Out/Safety Check  [x]Extracorporeal Circuit Tested for integrity RO/HEMODIALYSIS MACHINE SAFETY CHECKS  Before each treatment:    
Machine Number:                   [facility]  
                                [] Unit Machine #  with centralized RO 
                                [] Portable Machine #1/RO serial # [id number] [] Portable Machine #2/RO serial # [id number] [] Portable Machine #4/RO serial # [id number] [facility] 
                                [] Portable Machine #11/RO serial # [id number] [x] Portable Machine #12/RO serial # [id number]                                 [] Portable Machine #13/RO serial # [id number] Alarm Test:  Pass time 1608         Other:        
[x] RO/Machine Log Complete Temp    36 Dialysate: pH  7.4 Conductivity: Meter        HD Machine   14.2                  TCD: 14 
Dialyzer Lot # [id number]            Blood Tubing Lot # 18 [id number]          Saline Lot #  -[id number]  
 
CHLORINE TESTING-Before each treatment and every 4 hours Total Chlorine: [x] less than 0.1 ppm  Time: 1105 4 Hr/2nd Check Time: 2702  
(if greater than 0.1 ppm from Primary then every 30 minutes from Secondary) TREATMENT INITIATION  with Dialysis Precautions:  
[x] All Connections Secured                 [x] Saline Line Double Clamped  
[x] Venous Parameters Set                  [x] Arterial Parameters Set [x] Prime Given 250 ml                          [x]Air Foam Detector Engaged Treatment Initiation Note:Patient is admitted to the ICU, stable to begin her dialysis treatment. Her LUE AVG was cannulated without difficulty. Her treatment was then began. She does require Levophed to maintain her blood pressure during treatment. During Treatment Notes:  Arterial alarm frequently sounding (high pressure) during the first 30 minutes of dialysis treatment. It was at this time, her ventilator apparatus was being assessed by respiratory therapy. Her arterial needle had to be repositioned. The treatment continues with improved function of the arterial needle. Medication Dose Volume Route Initials Dialyzer Cleared: [] Good [] Fair  [] Poor Blood processed:  60.4 L 
UF Removed  500 Ml Post Wt:     kg 
POst BP:   125/56       Pulse: 61      Respirations: 20  Temperature: 96 Epogen    83385 Units        1 ml    IV        SH   Post Tx Vascular Access: AVF/AVG: Bleeding stopped Art 5 min. Osmany. 5 Min Catheter:   N/A Post Assessment: Skin:  [x] Warm  [x] Dry [] Diaphoretic    [] Flushed  [] Pale [] Cyanotic [facility] Signatures Title Initials  Time Lungs: [x] Clear    [] Course  [] Crackles  [] Wheezing [] Diminished [place] Amanda RN   Cardiac: [x] Regular   [] Irregular   [] Monitor  [] N/A  Rhythm:      
    Edema:  [] None    [x] General     [] Facial   [] Pedal    [] UE    [] LE  
    Pain: [x]0  []1  []2   []3  []4   []5   []6   []7   []8   []9   []10 Post Treatment Note: 
 
 Patient tolerated treatment well. Her blood was returned at the conclusion of her treatment. The needles were removed from her LUE AVG, and the insertion sites were covered with gauze and tape. POST TREATMENT PRIMARY NURSE HANDOFF REPORT:  
 
First initial/Last name/Title Post Dialysis: Sterling Erick RN Time:  0829 Abbreviations: AVG-arterial venous graft, AVF-arterial venous fistula, IJ-Internal Jugular, Subcl-Subclavian, Fem-Femoral, Tx-treatment, AP/HR-apical heart rate, DFR-dialysate flow rate, BFR-blood flow rate, AP-arterial pressure, -venous pressure, UF-ultrafiltrate, TMP-transmembrane pressure, Osmany-Venous, Art-Arterial, RO-Reverse Osmosis

## 2019-02-15 NOTE — MANAGEMENT PLAN
Discharge/Transition Planning Care Management following. Plan tentative on hospital progression. Has multiple end stage organ failure and on ventilator at this time. Chloé Madison RN BSN Outcomes Manager Pager # [phone]

## 2019-02-16 ENCOUNTER — APPOINTMENT (OUTPATIENT)
Dept: GENERAL RADIOLOGY | Age: 49
DRG: 871 | End: 2019-02-16
Attending: INTERNAL MEDICINE
Payer: MEDICARE

## 2019-02-16 ENCOUNTER — APPOINTMENT (OUTPATIENT)
Dept: GENERAL RADIOLOGY | Age: 49
DRG: 871 | End: 2019-02-16
Attending: NURSE PRACTITIONER
Payer: MEDICARE

## 2019-02-16 PROBLEM — D69.6 THROMBOCYTOPENIA (HCC): Status: ACTIVE | Noted: 2019-02-16

## 2019-02-16 LAB
ALBUMIN SERPL-MCNC: 1.7 G/DL (ref 3.4–5)
ANION GAP SERPL CALC-SCNC: 21 MMOL/L (ref 3–18)
ARTERIAL PATENCY WRIST A: ABNORMAL
ARTERIAL PATENCY WRIST A: YES
BACTERIA SPEC CULT: NORMAL
BASE DEFICIT BLD-SCNC: 6 MMOL/L
BASE DEFICIT BLD-SCNC: 6 MMOL/L
BASOPHILS # BLD: 0.1 K/UL (ref 0–0.1)
BASOPHILS NFR BLD: 1 % (ref 0–2)
BDY SITE: ABNORMAL
BDY SITE: ABNORMAL
BODY TEMPERATURE: 98.8
BODY TEMPERATURE: 99
BUN SERPL-MCNC: 14 MG/DL (ref 7–18)
BUN/CREAT SERPL: 5 (ref 12–20)
CALCIUM SERPL-MCNC: 8.3 MG/DL (ref 8.5–10.1)
CHLORIDE SERPL-SCNC: 100 MMOL/L (ref 100–108)
CO2 SERPL-SCNC: 19 MMOL/L (ref 21–32)
CREAT SERPL-MCNC: 2.66 MG/DL (ref 0.6–1.3)
DIFFERENTIAL METHOD BLD: ABNORMAL
EOSINOPHIL # BLD: 0.1 K/UL (ref 0–0.4)
EOSINOPHIL NFR BLD: 1 % (ref 0–5)
ERYTHROCYTE [DISTWIDTH] IN BLOOD BY AUTOMATED COUNT: 26.4 % (ref 11.6–14.5)
GAS FLOW.O2 O2 DELIVERY SYS: ABNORMAL L/MIN
GAS FLOW.O2 O2 DELIVERY SYS: ABNORMAL L/MIN
GAS FLOW.O2 SETTING OXYMISER: 14 BPM
GAS FLOW.O2 SETTING OXYMISER: 4 L/M
GLUCOSE BLD STRIP.AUTO-MCNC: 106 MG/DL (ref 70–110)
GLUCOSE BLD STRIP.AUTO-MCNC: 110 MG/DL (ref 70–110)
GLUCOSE BLD STRIP.AUTO-MCNC: 121 MG/DL (ref 70–110)
GLUCOSE BLD STRIP.AUTO-MCNC: 136 MG/DL (ref 70–110)
GLUCOSE BLD STRIP.AUTO-MCNC: 158 MG/DL (ref 70–110)
GLUCOSE BLD STRIP.AUTO-MCNC: 99 MG/DL (ref 70–110)
GLUCOSE SERPL-MCNC: 100 MG/DL (ref 74–99)
GRAM STN SPEC: NORMAL
GRAM STN SPEC: NORMAL
HCO3 BLD-SCNC: 16.7 MMOL/L (ref 22–26)
HCO3 BLD-SCNC: 18.5 MMOL/L (ref 22–26)
HCT VFR BLD AUTO: 21.8 % (ref 35–45)
HGB BLD-MCNC: 7.3 G/DL (ref 12–16)
INR PPP: 1.4 (ref 0.8–1.2)
INSPIRATION.DURATION SETTING TIME VENT: 1 SEC
LYMPHOCYTES # BLD: 1.2 K/UL (ref 0.9–3.6)
LYMPHOCYTES NFR BLD: 16 % (ref 21–52)
MCH RBC QN AUTO: 26.4 PG (ref 24–34)
MCHC RBC AUTO-ENTMCNC: 33.5 G/DL (ref 31–37)
MCV RBC AUTO: 78.7 FL (ref 74–97)
MONOCYTES # BLD: 0.8 K/UL (ref 0.05–1.2)
MONOCYTES NFR BLD: 10 % (ref 3–10)
NEUTS SEG # BLD: 5.4 K/UL (ref 1.8–8)
NEUTS SEG NFR BLD: 72 % (ref 40–73)
O2/TOTAL GAS SETTING VFR VENT: 35 %
O2/TOTAL GAS SETTING VFR VENT: 36 %
PCO2 BLD: 19.6 MMHG (ref 35–45)
PCO2 BLD: 29.6 MMHG (ref 35–45)
PEEP RESPIRATORY: 5 CMH2O
PH BLD: 7.41 [PH] (ref 7.35–7.45)
PH BLD: 7.54 [PH] (ref 7.35–7.45)
PHOSPHATE SERPL-MCNC: 2.6 MG/DL (ref 2.5–4.9)
PLATELET # BLD AUTO: 92 K/UL (ref 135–420)
PO2 BLD: 195 MMHG (ref 80–100)
PO2 BLD: 55 MMHG (ref 80–100)
POTASSIUM SERPL-SCNC: 3.8 MMOL/L (ref 3.5–5.5)
PROTHROMBIN TIME: 17.3 SEC (ref 11.5–15.2)
RBC # BLD AUTO: 2.77 M/UL (ref 4.2–5.3)
SAO2 % BLD: 100 % (ref 92–97)
SAO2 % BLD: 92 % (ref 92–97)
SERVICE CMNT-IMP: ABNORMAL
SERVICE CMNT-IMP: ABNORMAL
SERVICE CMNT-IMP: NORMAL
SODIUM SERPL-SCNC: 140 MMOL/L (ref 136–145)
SPECIMEN TYPE: ABNORMAL
SPECIMEN TYPE: ABNORMAL
TOTAL RESP. RATE, ITRR: 25
TOTAL RESP. RATE, ITRR: 25
VENTILATION MODE VENT: ABNORMAL
VOLUME CONTROL PLUS IVLCP: YES
VT SETTING VENT: 400 ML
WBC # BLD AUTO: 7.6 K/UL (ref 4.6–13.2)

## 2019-02-16 PROCEDURE — 74011000250 HC RX REV CODE- 250: Performed by: NURSE PRACTITIONER

## 2019-02-16 PROCEDURE — 65610000006 HC RM INTENSIVE CARE

## 2019-02-16 PROCEDURE — 82803 BLOOD GASES ANY COMBINATION: CPT

## 2019-02-16 PROCEDURE — 36600 WITHDRAWAL OF ARTERIAL BLOOD: CPT

## 2019-02-16 PROCEDURE — 74011250637 HC RX REV CODE- 250/637: Performed by: HOSPITALIST

## 2019-02-16 PROCEDURE — 74011250636 HC RX REV CODE- 250/636: Performed by: NURSE PRACTITIONER

## 2019-02-16 PROCEDURE — 74011636637 HC RX REV CODE- 636/637: Performed by: FAMILY MEDICINE

## 2019-02-16 PROCEDURE — 85025 COMPLETE CBC W/AUTO DIFF WBC: CPT

## 2019-02-16 PROCEDURE — 74011250637 HC RX REV CODE- 250/637: Performed by: INTERNAL MEDICINE

## 2019-02-16 PROCEDURE — 82962 GLUCOSE BLOOD TEST: CPT

## 2019-02-16 PROCEDURE — 74011000258 HC RX REV CODE- 258: Performed by: INTERNAL MEDICINE

## 2019-02-16 PROCEDURE — 77010033678 HC OXYGEN DAILY

## 2019-02-16 PROCEDURE — 94640 AIRWAY INHALATION TREATMENT: CPT

## 2019-02-16 PROCEDURE — 74011000250 HC RX REV CODE- 250: Performed by: INTERNAL MEDICINE

## 2019-02-16 PROCEDURE — 80069 RENAL FUNCTION PANEL: CPT

## 2019-02-16 PROCEDURE — 74011250636 HC RX REV CODE- 250/636: Performed by: INTERNAL MEDICINE

## 2019-02-16 PROCEDURE — 74011000250 HC RX REV CODE- 250: Performed by: HOSPITALIST

## 2019-02-16 PROCEDURE — 36591 DRAW BLOOD OFF VENOUS DEVICE: CPT

## 2019-02-16 PROCEDURE — 74018 RADEX ABDOMEN 1 VIEW: CPT

## 2019-02-16 PROCEDURE — 71045 X-RAY EXAM CHEST 1 VIEW: CPT

## 2019-02-16 PROCEDURE — 94003 VENT MGMT INPAT SUBQ DAY: CPT

## 2019-02-16 PROCEDURE — C9113 INJ PANTOPRAZOLE SODIUM, VIA: HCPCS | Performed by: NURSE PRACTITIONER

## 2019-02-16 PROCEDURE — 85610 PROTHROMBIN TIME: CPT

## 2019-02-16 RX ORDER — OXYCODONE HYDROCHLORIDE 5 MG/1
5 TABLET ORAL
Status: DISCONTINUED | OUTPATIENT
Start: 2019-02-16 | End: 2019-02-22

## 2019-02-16 RX ORDER — INSULIN LISPRO 100 [IU]/ML
INJECTION, SOLUTION INTRAVENOUS; SUBCUTANEOUS
Status: DISCONTINUED | OUTPATIENT
Start: 2019-02-16 | End: 2019-02-23 | Stop reason: HOSPADM

## 2019-02-16 RX ADMIN — BUDESONIDE 500 MCG: 0.5 INHALANT RESPIRATORY (INHALATION) at 08:38

## 2019-02-16 RX ADMIN — MIDODRINE HYDROCHLORIDE 5 MG: 2.5 TABLET ORAL at 21:00

## 2019-02-16 RX ADMIN — RIFAXIMIN 550 MG: 550 TABLET ORAL at 21:00

## 2019-02-16 RX ADMIN — IPRATROPIUM BROMIDE AND ALBUTEROL SULFATE 3 ML: .5; 3 SOLUTION RESPIRATORY (INHALATION) at 12:14

## 2019-02-16 RX ADMIN — ARFORMOTEROL TARTRATE 15 MCG: 15 SOLUTION RESPIRATORY (INHALATION) at 08:39

## 2019-02-16 RX ADMIN — RIFAXIMIN 550 MG: 550 TABLET ORAL at 09:34

## 2019-02-16 RX ADMIN — SODIUM CHLORIDE 40 MG: 9 INJECTION, SOLUTION INTRAMUSCULAR; INTRAVENOUS; SUBCUTANEOUS at 09:34

## 2019-02-16 RX ADMIN — IPRATROPIUM BROMIDE AND ALBUTEROL SULFATE 3 ML: .5; 3 SOLUTION RESPIRATORY (INHALATION) at 16:54

## 2019-02-16 RX ADMIN — ARFORMOTEROL TARTRATE 15 MCG: 15 SOLUTION RESPIRATORY (INHALATION) at 19:45

## 2019-02-16 RX ADMIN — CEFTRIAXONE 1 G: 1 INJECTION, POWDER, FOR SOLUTION INTRAMUSCULAR; INTRAVENOUS at 17:38

## 2019-02-16 RX ADMIN — INSULIN LISPRO 2 UNITS: 100 INJECTION, SOLUTION INTRAVENOUS; SUBCUTANEOUS at 21:43

## 2019-02-16 RX ADMIN — IPRATROPIUM BROMIDE AND ALBUTEROL SULFATE 3 ML: .5; 3 SOLUTION RESPIRATORY (INHALATION) at 23:30

## 2019-02-16 RX ADMIN — BUDESONIDE 500 MCG: 0.5 INHALANT RESPIRATORY (INHALATION) at 19:45

## 2019-02-16 RX ADMIN — IPRATROPIUM BROMIDE AND ALBUTEROL SULFATE 3 ML: .5; 3 SOLUTION RESPIRATORY (INHALATION) at 00:34

## 2019-02-16 RX ADMIN — NOREPINEPHRINE BITARTRATE 6 MCG/MIN: 1 INJECTION INTRAVENOUS at 03:00

## 2019-02-16 RX ADMIN — OXYCODONE HYDROCHLORIDE 5 MG: 5 TABLET ORAL at 17:38

## 2019-02-16 RX ADMIN — Medication 1 TABLET: at 09:34

## 2019-02-16 RX ADMIN — IPRATROPIUM BROMIDE AND ALBUTEROL SULFATE 3 ML: .5; 3 SOLUTION RESPIRATORY (INHALATION) at 03:10

## 2019-02-16 RX ADMIN — IPRATROPIUM BROMIDE AND ALBUTEROL SULFATE 3 ML: .5; 3 SOLUTION RESPIRATORY (INHALATION) at 08:39

## 2019-02-16 RX ADMIN — SODIUM CHLORIDE 0.7 MCG/KG/HR: 900 INJECTION, SOLUTION INTRAVENOUS at 03:00

## 2019-02-16 RX ADMIN — MIDODRINE HYDROCHLORIDE 5 MG: 2.5 TABLET ORAL at 05:30

## 2019-02-16 RX ADMIN — MIDODRINE HYDROCHLORIDE 5 MG: 2.5 TABLET ORAL at 13:28

## 2019-02-16 RX ADMIN — NOREPINEPHRINE BITARTRATE 8 MCG/MIN: 1 INJECTION INTRAVENOUS at 23:30

## 2019-02-16 RX ADMIN — LACTULOSE 10 G: 20 SOLUTION ORAL at 09:34

## 2019-02-16 RX ADMIN — IPRATROPIUM BROMIDE AND ALBUTEROL SULFATE 3 ML: .5; 3 SOLUTION RESPIRATORY (INHALATION) at 19:45

## 2019-02-16 RX ADMIN — LACTULOSE 10 G: 20 SOLUTION ORAL at 21:00

## 2019-02-16 NOTE — PROGRESS NOTES
Renal Progress Note Follow up of ESRD Assessment/Plan: 1. ESRD. on HD MWF  Next HD Monday , tolerated HD well yesterday 2. Hypotension due to sbp/liver failure/sepsis. Continue midodrin/ pressors. 3. HAGMA, new onset. Check lactate. 4. Resp failure. 5. Anemia of ckd. Continue greg with dialysis. 6. Secondary hyperparathyroidism of ckd. Continue to hold sensipar due to hypocalcemia. 7 Liver cirrhosis/ascites/sbp. On abx. GI on the case. EGD results noted- duodenal ulcers, no cbd stone. 8. Systolic chf.  
9. UTI. On abx. Please call with questions Marcie Butler MD FASN Cell [phone] Pager: [phone] Subjective: Intubated , sedated Patient Active Problem List  
Diagnosis Code  Metabolic encephalopathy X92.23  
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  
 SBP (spontaneous bacterial peritonitis) ([address] K65.2  Hypotension I95.9  Anemia, chronic disease D63.8  Coagulopathy ([address] D68.9  Acute respiratory failure (HCC) J96.00  Shock ([address] R57.9  Thrombocytopenia ([address] D69.6 Current Facility-Administered Medications Medication Dose Route Frequency Provider Last Rate Last Dose  pantoprazole (PROTONIX) 40 mg in sodium chloride 0.9% 10 mL injection  40 mg IntraVENous DAILY Dennis LAKE NP   40 mg at 02/16/19 4744  insulin lispro (HUMALOG) injection   SubCUTAneous Q6H Rebecca Shaikh NP   Stopped at 02/14/19 1400  
 glucose chewable tablet 16 g  4 Tab Oral PRN Early GRACE Jones      
 glucagon (GLUCAGEN) injection 1 mg  1 mg IntraMUSCular PRN Dennis LAKE NP      
 dextrose (D50) infusion 12.5-25 g  25-50 mL IntraVENous PRN Early GRACE Jones      
  dexmedeTOMidine (PRECEDEX) 400 mcg in 0.9% sodium chloride 100 mL infusion  0.2-0.7 mcg/kg/hr IntraVENous TITRATE Tara Lockwood MD   Stopped at 02/16/19 0921  albuterol-ipratropium (DUO-NEB) 2.5 MG-0.5 MG/3 ML  3 mL Nebulization Q4H RT Andrea Michaels MD   3 mL at 02/16/19 1214  
 NOREPINephrine (LEVOPHED) 8 mg in 0.9% NS 250ml infusion  2-16 mcg/min IntraVENous TITRATE Tara Lockwood MD 11.3 mL/hr at 02/16/19 0749 6 mcg/min at 02/16/19 0749  epoetin briseida (EPOGEN;PROCRIT) injection 10,000 Units  10,000 Units IntraVENous DIALYSIS KITA WOODSON & Arelis Moore MD   10,000 Units at 02/15/19 1900  
 rifAXIMin (XIFAXAN) tablet 550 mg  550 mg Oral BID Mich Fuentes MD   550 mg at 02/16/19 0934  
 midodrine (PROAMITINE) tablet 5 mg  5 mg Oral Q8H Anuel Zimmerman MD   5 mg at 02/16/19 0530  
 lactulose (CHRONULAC) solution 10 g  15 mL Oral TID Mich Fuentes MD   10 g at 02/16/19 9680  cefTRIAXone (ROCEPHIN) 1 g in 0.9% sodium chloride (MBP/ADV) 50 mL MBP  1 g IntraVENous Q24H Andrea Michaels  mL/hr at 02/15/19 1700 1 g at 02/15/19 1700  B complex-vitamin C-folic acid (NEPHRO-NACHO) 0.8 mg tab  1 Tab Oral DAILY Murray Wang MD   1 Tab at 02/16/19 9139  arformoterol (BROVANA) neb solution 15 mcg  15 mcg Nebulization BID RT Murray Wang MD   15 mcg at 02/16/19 4221 And  budesonide (PULMICORT) 500 mcg/2 ml nebulizer suspension  500 mcg Nebulization BID RT Murray Wang MD   500 mcg at 02/16/19 7786 Objective: 
Vitals:  
 02/16/19 0930 02/16/19 1000 02/16/19 1100 02/16/19 1215 BP: 106/47 110/46 108/47 Pulse: 81 81 81 94 Resp: 24 19 18 21 Temp: 98.2 °F (36.8 °C) 98.2 °F (36.8 °C) 98.3 °F (36.8 °C) TempSrc:      
SpO2: 99% 100% 98% 96% Weight:      
Height:      
 
. Intake/Output Summary (Last 24 hours) at 2/16/2019 1235 Last data filed at 2/16/2019 0730 Gross per 24 hour Intake 781.8 ml Output 900 ml Net -118.2 ml Admission weight:Weight: 61.2 kg (135 lb) (02/08/19 0706) Last Weight Metrics: 
Weight Loss Metrics 2/16/2019 10/30/2018 Today's Wt 116 lb 2.9 oz 139 lb BMI 19.33 kg/m2 23.13 kg/m2 Physical Exam:  
 
General: intubated. Sclerae icteric. ET tube in place. Neck: no jvd. LUNGS: good air entry, bl exp rhonchi. CVS EXM: S1, S2, no murmur, rub or gallop. Abdomen: Soft, Non Tender, moderately distended, non tense ascites. Lower Extremities: No Edema. Access: lt arm avf, good thrill, dressings intact. Labs: CBC w/Diff Recent Labs  
  02/16/19 
0310 02/15/19 
0350 02/14/19 
0345 WBC 7.6 9.4 11.8  
RBC 2.77* 2.75* 2.89* HGB 7.3* 7.2* 7.7* HCT 21.8* 21.5* 22.4* PLT 92* 85* 75* GRANS 72 76* 82* LYMPH 16* 14* 11* EOS 1 1 1 Chemistry Recent Labs  
  02/16/19 
0310 02/15/19 
0350 02/14/19 
0345 * 104* 75  138 140  
K 3.8 3.7 3.5  100 99* CO2 19* 16* 25 BUN 14 22* 14  
CREA 2.66* 4.31* 3.16* CA 8.3* 8.0* 8.4* AGAP 21* 22* 16  
BUCR 5* 5* 4* AP  --  467*  --   
TP  --  5.4*  --   
ALB 1.7* 1.8*  1.8* 2.1*  
GLOB  --  3.6  --   
AGRAT  --  0.5*  --   
PHOS 2.6 3.1 2.7 Lab Results Component Value Date/Time Iron 67 02/09/2019 05:48 AM  
 TIBC 143 (L) 02/09/2019 05:48 AM  
 Iron % saturation 47 02/09/2019 05:48 AM  
  
Lab Results Component Value Date/Time  Calcium 8.3 (L) 02/16/2019 03:10 AM  
 CALCIUM,IONIZED 4.50 10/30/2018 11:15 AM  
 Phosphorus 2.6 02/16/2019 03:10 AM

## 2019-02-16 NOTE — PROGRESS NOTES
Problem: Pressure Injury - Risk of 
Goal: *Prevention of pressure injury Document Honorio Scale and appropriate interventions in the flowsheet. Outcome: Progressing Towards Goal 
Pressure Injury Interventions: 
Sensory Interventions: Assess changes in LOC Moisture Interventions: Absorbent underpads Activity Interventions: Pressure redistribution bed/mattress(bed type) Mobility Interventions: HOB 30 degrees or less Nutrition Interventions: Document food/fluid/supplement intake Friction and Shear Interventions: HOB 30 degrees or less Problem: Falls - Risk of 
Goal: *Absence of Falls Document [employer] Fall Risk and appropriate interventions in the flowsheet. Fall Risk Interventions: 
Mobility Interventions: Bed/chair exit alarm Mentation Interventions: Adequate sleep, hydration, pain control, Bed/chair exit alarm Medication Interventions: Bed/chair exit alarm Elimination Interventions: Call light in reach History of Falls Interventions: Bed/chair exit alarm

## 2019-02-16 NOTE — PROGRESS NOTES
Bedside shift report received from RUPINDER Aparicio to include SBAR, Kardex, verification of IV drips 0830 KUB obtained for abdominal distention & no BM X 3 days KUB revealed gaseous distention of small/ large bowel Nepro stopped & OG tube placed to LIS, 600 cc tan liquid contained in suction cannister 1100  Lactic Acid drawn = 1.0 Protonix IV given. 1115 Precidex on hold for brief time. Patient became agitated, trying to break wrist restraints to reach ET tube 1130  Dialysis begun 1550  Dialysis complete   500 cc removed 1600  Family at bedside. Patient with eyes open & responds to requests to squeeze bilateral hands. Nods appropriately to Yes & No questions 1915  Bedside shift report given to Mary Hunt RN to include SBAR, Kardex, verification of IV drips

## 2019-02-16 NOTE — PROGRESS NOTES
Problem: Falls - Risk of 
Goal: *Absence of Falls Document Lalita Ruddy Fall Risk and appropriate interventions in the flowsheet. Outcome: Progressing Towards Goal 
Fall Risk Interventions: 
Mobility Interventions: Bed/chair exit alarm Mentation Interventions: Adequate sleep, hydration, pain control Medication Interventions: Bed/chair exit alarm Elimination Interventions: Call light in reach History of Falls Interventions: Bed/chair exit alarm

## 2019-02-16 NOTE — PROGRESS NOTES
Recd pt on vent:  AC VC+, Rate-  , Fio2=35%, Peep=5 Vent check compolete, adj ett position, jak tube patent 0850-No sedation - begin SBT with PSV=7 HR=75, Sat=100%, Fio2=35%, Peep=5, Dp=764, RR=36, Ve=4.85, FZZM=909 
 
1450--Called to bedside - pt has self extubated - BVM but pt spontaneously breathing & had SBT today, placed on NRB, then NC - pt will remain off vent & we will do ABG in 30 min 1536--Post extubation ABG: pH=7.405, CO2=29.6, IN7=153, NJ2=739%, HCO3=18.5 on 4 lpm NC - wean O2= 2 lpm - notified Dr. Lyndsay Blackmon of result. VENTILATOR Care plan 
 
Problem: Ventilator Management Goal: *Adequate oxygenation/ ventilation/ and extubation Patient: 
   
 
Alejo Lundy     46 y.o.   female     2/16/2019  10:03 AM 
Patient Active Problem List  
Diagnosis Code  Metabolic encephalopathy Q06.66  
 UTI (urinary tract infection) N39.0  Cirrhosis ([address] K74.60  ESRD (end stage renal disease) on dialysis ([facility]) N18.6, Z99.2  COPD (chronic obstructive pulmonary disease) ([facility]) J44.9  Hyperbilirubinemia E80.6  Combined systolic and diastolic heart failure ([facility]) I50.40  Cholestatic jaundice R17  
 SBP (spontaneous bacterial peritonitis) ([address] K65.2  Hypotension I95.9  Anemia, chronic disease D63.8  Coagulopathy ([address] D68.9  Acute respiratory failure ([facility]) J96.00  Shock ([address] R57.9  Thrombocytopenia ([address] D69.6 UTI (urinary tract infection) [N39.0] Metabolic encephalopathy [E02.40] Reason patient intubated:  Pt intubated due to AMS & airway protection. Ventilator day: 3 Ventilator settings: AC VC+, Rate=14, Wp=101, Fio2=35%, Peep=5 ETT Size/Placement: 7.5 ETT, 24 lip ABG: 
Date:2/16/2019 Lab Results Component Value Date/Time PHI 7.538 (H) 02/16/2019 05:07 AM  
 PCO2I 19.6 (L) 02/16/2019 05:07 AM  
 PO2I 55 (L) 02/16/2019 05:07 AM  
 HCO3I 16.7 (L) 02/16/2019 05:07 AM  
 FIO2I 35 02/16/2019 05:07 AM  
 
 
Chest X-ray: 
Date:2/16/2019 Results from [facility] [place] Encounter encounter on 02/08/19 XR ABD (KUB) Narrative Abdomen, single view COMPARISON: 2/13/2019 INDICATION: Ileus FINDINGS: Supine AP portable view the abdomen obtained. Nasogastric tube 
projects over the stomach. Mild gaseous distention of small and large bowel 
noted no gross free intraperitoneal gas given limitations of AP supine 
technique. No acute osseous abnormality. Impression Impression: 1. Nasogastric tube in expected position. 2. Bowel gas pattern compatible with the provided history of ileus. Lab Test: 
Date:2/16/2019 WBC:  
Lab Results Component Value Date/Time WBC 7.6 02/16/2019 03:10 AM  
HGB:  
Lab Results Component Value Date/Time HGB 7.3 (L) 02/16/2019 03:10 AM  
 PLTS:  
Lab Results Component Value Date/Time PLATELET 92 (L) 54/54/4973 03:10 AM  
 
 
SaO2%/flow: @DQTSTXS2(7)@ Vital Signs:    
Patient Vitals for the past 8 hrs: 
 Temp Pulse Resp BP SpO2  
02/16/19 0930 98.2 °F (36.8 °C) 81 24 106/47 99 % 02/16/19 0900 98.2 °F (36.8 °C) 71 23 108/47 99 % 02/16/19 0839  70 25  98 % 02/16/19 0830 98 °F (36.7 °C) 71 26 107/52 96 % 02/16/19 0800 98.1 °F (36.7 °C) 69 22 109/44 97 % 02/16/19 0730 98.1 °F (36.7 °C) 66 17 103/40 99 % 02/16/19 0700 98.1 °F (36.7 °C) 69 23 108/45 95 % 02/16/19 0630 98.2 °F (36.8 °C) 72 26 104/49 98 % 02/16/19 0600 98.3 °F (36.8 °C) 66 19 112/48 96 % 02/16/19 0530 98.7 °F (37.1 °C) 70 23 120/56 92 % 02/16/19 0500 98.9 °F (37.2 °C) 70 21 116/50 98 % 02/16/19 0430 99.4 °F (37.4 °C) 70 19 110/49 94 % 02/16/19 0400 99.8 °F (37.7 °C) 72 19 114/48 95 % 02/16/19 0330 100.2 °F (37.9 °C) 75 23 117/45 99 % 02/16/19 0307  71 20  100 % 02/16/19 0300 (!) 100.5 °F (38.1 °C) 72 19 116/48 94 % 02/16/19 0230 100.2 °F (37.9 °C) 73 21 111/47 100 % Wean Screen Pass (Yes or No): y Wean Screen Reason for Failure: 
Duration of Weaning Trial: 
Additional Comments: PLAN OF CARE:  Monitor pt on vent, wean titrate as tolerated to maintain pt sats. Initiate SBT. GOAL:  Extubation. OUTCOME: Pt self extubated at 1450, follow up ABG completed & pt stable.

## 2019-02-16 NOTE — PROGRESS NOTES
Gastrointestinal Progress Note Patient Name: Dayanara Muir Today's Date: 2/16/2019 Admit Date: 2/8/2019 Assessment-Recommendation: 1. Abnormal liver enzymes. Cholestatic jaundice. Continue supportive care. Subjective:  
 
Stable Current Facility-Administered Medications Medication Dose Route Frequency  oxyCODONE IR (ROXICODONE) tablet 5 mg  5 mg Oral Q6H PRN  pantoprazole (PROTONIX) 40 mg in sodium chloride 0.9% 10 mL injection  40 mg IntraVENous DAILY  insulin lispro (HUMALOG) injection   SubCUTAneous Q6H  
 glucose chewable tablet 16 g  4 Tab Oral PRN  
 glucagon (GLUCAGEN) injection 1 mg  1 mg IntraMUSCular PRN  
 dextrose (D50) infusion 12.5-25 g  25-50 mL IntraVENous PRN  
 dexmedeTOMidine (PRECEDEX) 400 mcg in 0.9% sodium chloride 100 mL infusion  0.2-0.7 mcg/kg/hr IntraVENous TITRATE  albuterol-ipratropium (DUO-NEB) 2.5 MG-0.5 MG/3 ML  3 mL Nebulization Q4H RT  
 NOREPINephrine (LEVOPHED) 8 mg in 0.9% NS 250ml infusion  2-16 mcg/min IntraVENous TITRATE  epoetin briseida (EPOGEN;PROCRIT) injection 10,000 Units  10,000 Units IntraVENous DIALYSIS MON, WED & FRI  rifAXIMin (XIFAXAN) tablet 550 mg  550 mg Oral BID  midodrine (PROAMITINE) tablet 5 mg  5 mg Oral Q8H  
 lactulose (CHRONULAC) solution 10 g  15 mL Oral TID  cefTRIAXone (ROCEPHIN) 1 g in 0.9% sodium chloride (MBP/ADV) 50 mL MBP  1 g IntraVENous Q24H  B complex-vitamin C-folic acid (NEPHRO-NACHO) 0.8 mg tab  1 Tab Oral DAILY  arformoterol (BROVANA) neb solution 15 mcg  15 mcg Nebulization BID RT And  
 budesonide (PULMICORT) 500 mcg/2 ml nebulizer suspension  500 mcg Nebulization BID RT  
  
 
 
Objective:  
 
Physical Exam: Intubated Awake Responded to yes or no commands CV RRR Abdomen soft Data Review: 
 
Labs: Results:  
   
Chemistry Recent Labs  
  02/16/19 
0310 02/15/19 
0350 02/14/19 
0345 * 104* 75  138 140  
K 3.8 3.7 3.5  100 99*  
 CO2 19* 16* 25 BUN 14 22* 14  
CREA 2.66* 4.31* 3.16* CA 8.3* 8.0* 8.4* AGAP 21* 22* 16  
BUCR 5* 5* 4* AP  --  467*  --   
TP  --  5.4*  --   
ALB 1.7* 1.8*  1.8* 2.1*  
GLOB  --  3.6  --   
AGRAT  --  0.5*  --   
  
CBC w/Diff Recent Labs  
  02/16/19 
0310 02/15/19 
0350 02/14/19 
0345 WBC 7.6 9.4 11.8  
RBC 2.77* 2.75* 2.89* HGB 7.3* 7.2* 7.7* HCT 21.8* 21.5* 22.4* PLT 92* 85* 75* GRANS 72 76* 82* LYMPH 16* 14* 11* EOS 1 1 1 Coagulation Recent Labs  
  02/16/19 
0310 02/15/19 
0350 PTP 17.3* 18.4* INR 1.4* 1.6* Liver Enzymes Recent Labs  
  02/16/19 
0310 02/15/19 
0350 TP  --  5.4* ALB 1.7* 1.8*  1.8* AP  --  467* SGOT  --  29 ALT  --  25 John Alarcon MD 
February 16, 2019

## 2019-02-16 NOTE — PROGRESS NOTES
0730 Report received at the bedside from off going [address] [place] [address] Drive. All covered inclusive of SBAR and lines and drains and plan. At 1450 patient extubated self, currently tolerating well, gasses drawn, on [place] 4l o2.  
 
1800 Patient with some pain, Dr. Gladis Aase notified and ordered Nolvia 5 q 6 prn. Patient given swallow test at the bedside, tolerated well pudding, applesauce and water and ice chips, sitting upright, no coughing or signs of difficulty with swallowing. Gave oral medication. Patient cont to have labile blood pressure with map not greater than 64 for day, levophed remains on, unable to titrate lower. Abdominal girth at umbilicus measured at 40 inches. Continues with large ascites belly. 1930 Report given to oncoming RN Lala Castro at the bedside inclusive of SBAR lines drains and plan. Oncoming RN made aware of patient since being extubated and off soft restraints is attempting to get out of bed and climb over side rails multiple times, needs frequent reminders to stay in bed.

## 2019-02-16 NOTE — CONSULTS
[facility]  Name: Michelle Bajwa   : 1970   MRN: [id number]   Date: 2019    []I have reviewed the flowsheet and previous days notes. []The patient is unable to give any meaningful history or review of systems because the patient is:  []Intubated []Sedated   []Unresponsive      []The patient is critically ill on      []Mechanical ventilation []Pressors   []BiPAP []   S: NONE              ROS:Review of systems not obtained due to patient factors. Events and notes from last 24 hours reviewed. Care plan discussed on multidisciplinary rounds. Vital Signs:    Visit Vitals  /47   Pulse 81   Temp 98.3 °F (36.8 °C)   Resp 18   Ht 5' 5\" (1.651 m)   Wt 52.7 kg (116 lb 2.9 oz)   SpO2 98%   BMI 19.33 kg/m²       O2 Device: Endotracheal tube   O2 Flow Rate (L/min): 8 l/min   Temp (24hrs), Av.3 °F (36.3 °C), Min:95.5 °F (35.3 °C), Max:100.5 °F (38.1 °C)       Intake/Output:   Last shift:       0701 -  1900  In: 3.8 [I.V.:3.8]  Out: -   Last 3 shifts:  1901 -  0700  In: 3422 [I.V.:1441]  Out: 900     Intake/Output Summary (Last 24 hours) at 2019 1106  Last data filed at 2019 0730  Gross per 24 hour   Intake 802.6 ml   Output 900 ml   Net -97.4 ml     Hemodynamics:       :      Ventilator Settings:  Ventilator Mode: Assist control, VC+  Respiratory Rate  Back-Up Rate: 14  Insp Time (sec): 1 sec  I:E Ratio: 1:1.9  Ventilator Volumes  Vt Set (ml): 400 ml  Vt Exhaled (Machine Breath) (ml): 417 ml  Ve Observed (l/min): 8.93 l/min  Ventilator Pressures  PIP Observed (cm H2O): 18 cm H2O  Plateau Pressure (cm H2O): 20 cm H2O  MAP (cm H2O): 10  PEEP/VENT (cm H2O): 5 cm H20  Auto PEEP Observed (cm H2O): 0 cm H2O    Physical Exam:    General: in no respiratory distress and acyanotic and moderately ill   HEENT: ETT IN PLACE   Neck: No abnormally enlarged lymph nodes.    Chest: increased AP diameter   Lungs: decreased air exchange bilaterally and distant lung sounds  no dullness/ tenderness/ rash   Heart: Regular rate and rhythm or S1S2 present   Abdomen: non distended, bowel sounds normoactive, tympanic, no rebound tenderness, no guarding or rigidity, gross ascites noted   Extremity: 1+ edema   Neuro: lethargic   Skin: Skin color, texture, turgor normal. No rashes or lesions      DATA:   Current Facility-Administered Medications   Medication Dose Route Frequency    pantoprazole (PROTONIX) 40 mg in sodium chloride 0.9% 10 mL injection  40 mg IntraVENous DAILY    insulin lispro (HUMALOG) injection   SubCUTAneous Q6H    glucose chewable tablet 16 g  4 Tab Oral PRN    glucagon (GLUCAGEN) injection 1 mg  1 mg IntraMUSCular PRN    dextrose (D50) infusion 12.5-25 g  25-50 mL IntraVENous PRN    dexmedeTOMidine (PRECEDEX) 400 mcg in 0.9% sodium chloride 100 mL infusion  0.2-0.7 mcg/kg/hr IntraVENous TITRATE    albuterol-ipratropium (DUO-NEB) 2.5 MG-0.5 MG/3 ML  3 mL Nebulization Q4H RT    NOREPINephrine (LEVOPHED) 8 mg in 0.9% NS 250ml infusion  2-16 mcg/min IntraVENous TITRATE    epoetin briseida (EPOGEN;PROCRIT) injection 10,000 Units  10,000 Units IntraVENous DIALYSIS MON, WED & FRI    rifAXIMin (XIFAXAN) tablet 550 mg  550 mg Oral BID    midodrine (PROAMITINE) tablet 5 mg  5 mg Oral Q8H    lactulose (CHRONULAC) solution 10 g  15 mL Oral TID    cefTRIAXone (ROCEPHIN) 1 g in 0.9% sodium chloride (MBP/ADV) 50 mL MBP  1 g IntraVENous Q24H    B complex-vitamin C-folic acid (NEPHRO-NACHO) 0.8 mg tab  1 Tab Oral DAILY    arformoterol (BROVANA) neb solution 15 mcg  15 mcg Nebulization BID RT    And    budesonide (PULMICORT) 500 mcg/2 ml nebulizer suspension  500 mcg Nebulization BID RT       Telemetry: [x]Sinus []A-flutter []Paced    []A-fib []Multiple PVCs                  Labs:  Recent Labs     02/16/19  0310 02/15/19  0350 02/14/19  0345   WBC 7.6 9.4 11.8   HGB 7.3* 7.2* 7.7*   HCT 21.8* 21.5* 22.4*   PLT 92* 85* 75*     Recent Labs     02/16/19  0310 02/15/19  6169 02/14/19  0345    138 140   K 3.8 3.7 3.5    100 99*   CO2 19* 16* 25   * 104* 75   BUN 14 22* 14   CREA 2.66* 4.31* 3.16*   CA 8.3* 8.0* 8.4*   PHOS 2.6 3.1 2.7   ALB 1.7* 1.8*  1.8* 2.1*   SGOT  --  29  --    ALT  --  25  --    INR 1.4* 1.6* 1.5*     No results for input(s): PH, PCO2, PO2, HCO3, FIO2 in the last 72 hours.     Imaging:  [x]I have personally reviewed the patients radiographs  []Radiographs reviewed with radiologist   [] No CXR study available for review today   [x]No change from prior, tubes and lines in adequate position  []Improved   []Worsening       IMPRESSION:   Patient Active Problem List   Diagnosis Code    Metabolic encephalopathy Y52.24    UTI (urinary tract infection) N39.0    Cirrhosis ([address] K74.60    ESRD (end stage renal disease) on dialysis ([address] N18.6, Z99.2    COPD (chronic obstructive pulmonary disease) ([address] J44.9    Hyperbilirubinemia E80.6    Combined systolic and diastolic heart failure ([address] I50.40    Cholestatic jaundice R17    SBP (spontaneous bacterial peritonitis) ([address] K65.2    Hypotension I95.9    Anemia, chronic disease D63.8    Coagulopathy ([address] D68.9    Acute respiratory failure ([address] J96.00    Shock ([address] R57.9    Thrombocytopenia (HCC) D69.6     · Acute respiratory failure requiring mechanical ventilation  · Shock- on Levo  · End stage liver disease  · Spontaneous Bacterial Peritonitis- on Ceftriaxone  · ESRD  · Encephalopathy- on Rifaxamin  · CHF  · UTI  · Thrombocytopenia  · COPD  Hx  · CAD  · COPD  · DM  · ESRD  · HLD  · HTN  · S/p heart valve repair      PLAN:       ·   ·    PLAN:   · SBT: IN PROGRESS: VT LOOK TOO SMALL: 3-4 ML/KG  · TRANSFUSE PRBCs PER PRIMARY SERVICE  · CONTINUE MV  · CBC/CMP  · PCXR     The patient is: [x] acutely ill Risk of deterioration: [x] moderate    [] critically ill  [] high     [x]See my orders for details    My assessment, plan of care, findings, medications, side effects etc were discussed with:  [x]nursing []PT/OT    [x]respiratory therapy [x]   []family []     [x]Total critical care time exclusive of procedures 25 minutes  Kane Farley MD

## 2019-02-16 NOTE — PROGRESS NOTES
Physical Exam  
Eyes: Scleral icterus is present. Periorbital edema present around both eyes Skin:  
 
  
Bedside change of shift report was given to me, Gabriela Bhagat RN  ( ICU night shift nurse), Neil Harris RN ( ICU night shift nurse). Report included the following information:  SBAR, kardex, consultations, hemodynamic monitoring, intake/output, MAR, lab results, VS trends, cardiac rhythm noted NSR. Skin, neuro, respiratory status, order verification, and critical IV gtt rate verification has been dually noted and verified at the bedside with:  Neil Harris RN                                     
NSICU Nurse's Note: 
2000:  Pt is awake, alert x 2-3 and able to nod and gesture appropriately to convey her needs. Pt has notable generalized weakness with some random movement to all extremities. Pt does require max assist with all ADLs and repositioning. Bed is locked and in lowest position,side rails up x3, exit alarm activated, call bell is in reach. Interventions are ongoing,will continue to monitor. Neurological: as noted in assessment Cardiovascular: NSR on the monitor. Pulmonary: breath sounds diminished, saturations maintained on vent with 35% FiO2 GI/: Abdomen is distended at baseline with ascites, hypoactive bowel sounds. Pt is NPO. Last BM noted 02/12/2018. Pt is anuric at baseline per hx ESRD. IVF/Critical gtts: IV levophed gtt, IV Precedex gtt. Skin: as noted above 
 
0000: Pt remains NPO. Accucheck noted 106. No SSI indicated at this time. Pt was suctioned and repositioned. Will continue to monitor. 0345: Labs drawn from CVL RIJ, flushed, patent. Pt was suctioned and repositioned. 0445: Pt was given a complete bed bath and tolerated bed linen change. Pt also tolerated scheduled portable CXR at the bedside and ABG without incident. 0600: Pt remains NPO. Accucheck noted 99. Pt was suctioned and repositioned.  Bed is locked and in lowest position,side rails up x3, exit alarm activated, call bell is in reach. Interventions are ongoing,will continue to monitor.   
0710: Bedside change of shift report given to: Josr Reyes RN and Nissa Nguyen RN

## 2019-02-16 NOTE — PROGRESS NOTES
Internal Medicine Progress Note Patient's Name: Donis Tobar Admit Date: 2/8/2019 Length of Stay: 8 Assessment/Plan Principal Problem: 
  SBP (spontaneous bacterial peritonitis) ([address] (2/12/2019) Active Problems: Metabolic encephalopathy (8/3/4134) UTI (urinary tract infection) (2/8/2019) Cirrhosis ([address] (2/8/2019) ESRD (end stage renal disease) on dialysis ([address] (2/8/2019) COPD (chronic obstructive pulmonary disease) ([address] (2/8/2019) Hyperbilirubinemia (2/8/2019) Combined systolic and diastolic heart failure ([address] (2/10/2019) Cholestatic jaundice (2/12/2019) Hypotension (2/13/2019) Anemia, chronic disease (2/15/2019) Coagulopathy ([address] (2/15/2019) Acute respiratory failure ([address] (2/15/2019) Shock ([address] (2/15/2019) Thrombocytopenia ([address] (2/16/2019) SBP 
- cont rocephin 
- paracentesis done 2/11, cell count profile consistent with bacterial peritonitis 
  
ESRD on dialysis 
- nephro consult - appreciate assistance 
- HD on 2/9 
- hold dialysis on 2/12 d/t HOTN - midodrine added 
- dialysis done 2/13, albumin given, no fluid pulled off 
  
Cholestatic jaundice 
- CT abd results below - GI consult - appreciate assistance - serologies, paracentesis to eval fluid, start lactulose/rifaximin, vit K x3 days - MRCP w/o contrast, transjugular liver bx ordered - MRCP unable to be done - ERCP done 2/13 - results below, GI recs medical management of decompensated chronic liver disease with cholestasis - cytology? Brushings? 
  
UTI 
- iv rocephin x5 days - UCx - negative 
  
Metabolic encephalopathy 
- Head CT - no acute abnormality 
- monitor neuro status - worsened on 2/13 likely secondary to anesthesia - w/d to pain 
  
HF 
- cardio consult - appreciate assistance 
  
COPD 
- [place] Acute Resp failure 
-Intubated 
-Sedated 
  
Shock 
-Levophed 
  
 
 
 
- Cont acceptable home medications for chronic conditions - DVT protocol I have personally reviewed all pertinent labs and films that have officially resulted over the last 24 hours. I have personally checked for all pending labs that are awaiting final results. Interval History Meghan Bank a 46 y.o. female with a PMHx ESRD on dialysis, DM, CAD, COPD, syst/diastolic heart failure, HTN, chronic liver disease who presented to the ED from dialysis due to AMS. History limited 2/2 AMS. ED eval revealed scleral icterus, confusion, leukocytosis, UTI. Hyperbilirubinemia (21.7) and elevated alk phos (571) are similar to recent ED visit at [facility] 2/1. Review of prior records from [facility] revealed she was supposed to have a percutaneous gallbladder drain placed on 2/4/19. Head CT neg. Pt will be admitted for further evaluation. IV rocephin started. Nephrology consulted - HD on 2/9 as patient missed the prior day. CT abd without contrast ordered, results below. GI consulted - serologies, paracentesis to eval fluid, start lactulose/rifaximin, vit K x3 days, cardio consult. UCx negative. PT recommending SNF. Paracentesis performed 2/11 - removed 100cc dark jed fluid, cell count profile consistent with SBP. Pt already on rocephin. Fluid cx ngtd. Midodrine added for HOTN. MRCP w/o contrast and transjugular liver bx ordered per GI. MRCP unable to be done 2/2 patient not cooperating. ERCP done on 2/13 instead, no CBD stone or stricture seen. Patient transferred to ICU afterwards 2/2 HOTN and need for pressors instead of IVF due to ESRD. ICU team concerned about AMS and pt's ability to protect airway so patient was intubated. Pt's daughter informed of recent events, wishes for patient to remain full code. Dialysis 2/15. SBT Subjective Pt s/e @ bedside. Intubated Objective Visit Vitals /57 Pulse (!) 104 Temp 99 °F (37.2 °C) Resp 25 Ht 5' 5\" (1.651 m) Wt 52.7 kg (116 lb 2.9 oz) SpO2 100% BMI 19.33 kg/m² Physical Exam: General Appearance: Intubated, ET tube in place HENT: normocephalic/atraumatic, moist mucus membranes Neck: No JVD, supple Lungs: decreased air exchange, no wheeze, or crackles CV: Tachycardia, no m/r/g Abdomen: soft, non-tender, normal bowel sounds Extremities: no cyanosis, no peripheral edema Neuro: No focal deficits, motor/sensory intact Skin: Normal color, intact, acyanotic Intake and Output: 
Current Shift:  02/16 0701 - 02/16 1900 In: 204.3 [I.V.:54.3] Out: - Last three shifts:  02/14 1901 - 02/16 0700 In: 2500 [I.V.:1441] Out: 900 Lab/Data Reviewed: All lab results for the last 24 hours reviewed. Imaging Reviewed: 
Xr Abd (ku) Result Date: 2/16/2019 EXAM: AP supine view of the abdomen INDICATION: Abdominal pain COMPARISON: February 15, 2019 _______________ FINDINGS: A nasogastric tube is seen in the stomach. Non-obstructive bowel gas pattern has improved since the previous study. There are no abnormal calcifications. Osseous structures are within normal limits. _______________ IMPRESSION: Improved nonobstructive bowel gas pattern Xr Chest [facility] Result Date: 2/16/2019 EXAM: AP radiograph of the chest INDICATION: Respiratory failure COMPARISON: February 15, 2019 _______________ FINDINGS: A nasogastric tube is positioned in the stomach. A right IJ approach central venous catheter is unchanged near the right atrium. The endotracheal tube is 2 cm above the sheryl. Stable appearance of the cardiac silhouette and mediastinal contour. There is left basilar opacity not significantly changed from the prior study. The right lung is clear. There is no pneumothorax or pleural effusion. _______________ IMPRESSION: 1. Persistent left basilar opacity that may represent atelectasis or airspace disease 2. Support devices as above Medications Reviewed: 
Current Facility-Administered Medications Medication Dose Route Frequency  pantoprazole (PROTONIX) 40 mg in sodium chloride 0.9% 10 mL injection  40 mg IntraVENous DAILY  insulin lispro (HUMALOG) injection   SubCUTAneous Q6H  
 glucose chewable tablet 16 g  4 Tab Oral PRN  
 glucagon (GLUCAGEN) injection 1 mg  1 mg IntraMUSCular PRN  
 dextrose (D50) infusion 12.5-25 g  25-50 mL IntraVENous PRN  
 dexmedeTOMidine (PRECEDEX) 400 mcg in 0.9% sodium chloride 100 mL infusion  0.2-0.7 mcg/kg/hr IntraVENous TITRATE  albuterol-ipratropium (DUO-NEB) 2.5 MG-0.5 MG/3 ML  3 mL Nebulization Q4H RT  
 NOREPINephrine (LEVOPHED) 8 mg in 0.9% NS 250ml infusion  2-16 mcg/min IntraVENous TITRATE  epoetin briseida (EPOGEN;PROCRIT) injection 10,000 Units  10,000 Units IntraVENous DIALYSIS MON, WED & FRI  rifAXIMin (XIFAXAN) tablet 550 mg  550 mg Oral BID  midodrine (PROAMITINE) tablet 5 mg  5 mg Oral Q8H  
 lactulose (CHRONULAC) solution 10 g  15 mL Oral TID  cefTRIAXone (ROCEPHIN) 1 g in 0.9% sodium chloride (MBP/ADV) 50 mL MBP  1 g IntraVENous Q24H  B complex-vitamin C-folic acid (NEPHRO-NACHO) 0.8 mg tab  1 Tab Oral DAILY  arformoterol (BROVANA) neb solution 15 mcg  15 mcg Nebulization BID RT And  
 budesonide (PULMICORT) 500 mcg/2 ml nebulizer suspension  500 mcg Nebulization BID RT Harinder Davis PA-C [address] [place], [place] [address]ialty [facility] Hospitalist Division Pager: [phone] Office: [phone]

## 2019-02-17 ENCOUNTER — APPOINTMENT (OUTPATIENT)
Dept: GENERAL RADIOLOGY | Age: 49
DRG: 871 | End: 2019-02-17
Attending: NURSE PRACTITIONER
Payer: MEDICARE

## 2019-02-17 PROBLEM — K56.7 ILEUS (HCC): Status: ACTIVE | Noted: 2019-02-17

## 2019-02-17 LAB
ALBUMIN SERPL-MCNC: 1.7 G/DL (ref 3.4–5)
ANION GAP SERPL CALC-SCNC: 13 MMOL/L (ref 3–18)
BASOPHILS # BLD: 0.1 K/UL (ref 0–0.1)
BASOPHILS NFR BLD: 1 % (ref 0–2)
BUN SERPL-MCNC: 20 MG/DL (ref 7–18)
BUN/CREAT SERPL: 5 (ref 12–20)
CALCIUM SERPL-MCNC: 8.8 MG/DL (ref 8.5–10.1)
CHLORIDE SERPL-SCNC: 99 MMOL/L (ref 100–108)
CO2 SERPL-SCNC: 25 MMOL/L (ref 21–32)
CREAT SERPL-MCNC: 3.9 MG/DL (ref 0.6–1.3)
DIFFERENTIAL METHOD BLD: ABNORMAL
EOSINOPHIL # BLD: 0.2 K/UL (ref 0–0.4)
EOSINOPHIL NFR BLD: 2 % (ref 0–5)
ERYTHROCYTE [DISTWIDTH] IN BLOOD BY AUTOMATED COUNT: 26.5 % (ref 11.6–14.5)
GLUCOSE BLD STRIP.AUTO-MCNC: 126 MG/DL (ref 70–110)
GLUCOSE BLD STRIP.AUTO-MCNC: 131 MG/DL (ref 70–110)
GLUCOSE BLD STRIP.AUTO-MCNC: 137 MG/DL (ref 70–110)
GLUCOSE BLD STRIP.AUTO-MCNC: 190 MG/DL (ref 70–110)
GLUCOSE SERPL-MCNC: 109 MG/DL (ref 74–99)
HCT VFR BLD AUTO: 21.3 % (ref 35–45)
HGB BLD-MCNC: 7 G/DL (ref 12–16)
INR PPP: 1.4 (ref 0.8–1.2)
LYMPHOCYTES # BLD: 1.4 K/UL (ref 0.9–3.6)
LYMPHOCYTES NFR BLD: 17 % (ref 21–52)
MCH RBC QN AUTO: 26.4 PG (ref 24–34)
MCHC RBC AUTO-ENTMCNC: 32.9 G/DL (ref 31–37)
MCV RBC AUTO: 80.4 FL (ref 74–97)
MONOCYTES # BLD: 0.9 K/UL (ref 0.05–1.2)
MONOCYTES NFR BLD: 12 % (ref 3–10)
NEUTS SEG # BLD: 5.4 K/UL (ref 1.8–8)
NEUTS SEG NFR BLD: 68 % (ref 40–73)
PHOSPHATE SERPL-MCNC: 3.7 MG/DL (ref 2.5–4.9)
PLATELET # BLD AUTO: 91 K/UL (ref 135–420)
POTASSIUM SERPL-SCNC: 3.8 MMOL/L (ref 3.5–5.5)
PROTHROMBIN TIME: 16.6 SEC (ref 11.5–15.2)
RBC # BLD AUTO: 2.65 M/UL (ref 4.2–5.3)
SODIUM SERPL-SCNC: 137 MMOL/L (ref 136–145)
WBC # BLD AUTO: 7.9 K/UL (ref 4.6–13.2)

## 2019-02-17 PROCEDURE — 97162 PT EVAL MOD COMPLEX 30 MIN: CPT

## 2019-02-17 PROCEDURE — 74011250637 HC RX REV CODE- 250/637: Performed by: INTERNAL MEDICINE

## 2019-02-17 PROCEDURE — 92610 EVALUATE SWALLOWING FUNCTION: CPT

## 2019-02-17 PROCEDURE — 74011250636 HC RX REV CODE- 250/636: Performed by: NURSE PRACTITIONER

## 2019-02-17 PROCEDURE — 74011250637 HC RX REV CODE- 250/637: Performed by: HOSPITALIST

## 2019-02-17 PROCEDURE — 77010033678 HC OXYGEN DAILY

## 2019-02-17 PROCEDURE — 74018 RADEX ABDOMEN 1 VIEW: CPT

## 2019-02-17 PROCEDURE — 97112 NEUROMUSCULAR REEDUCATION: CPT

## 2019-02-17 PROCEDURE — 74011000250 HC RX REV CODE- 250: Performed by: INTERNAL MEDICINE

## 2019-02-17 PROCEDURE — 74011000258 HC RX REV CODE- 258: Performed by: INTERNAL MEDICINE

## 2019-02-17 PROCEDURE — 74011000250 HC RX REV CODE- 250: Performed by: HOSPITALIST

## 2019-02-17 PROCEDURE — 85025 COMPLETE CBC W/AUTO DIFF WBC: CPT

## 2019-02-17 PROCEDURE — C9113 INJ PANTOPRAZOLE SODIUM, VIA: HCPCS | Performed by: NURSE PRACTITIONER

## 2019-02-17 PROCEDURE — 74011250636 HC RX REV CODE- 250/636: Performed by: INTERNAL MEDICINE

## 2019-02-17 PROCEDURE — 97164 PT RE-EVAL EST PLAN CARE: CPT

## 2019-02-17 PROCEDURE — 94640 AIRWAY INHALATION TREATMENT: CPT

## 2019-02-17 PROCEDURE — 74011636637 HC RX REV CODE- 636/637: Performed by: FAMILY MEDICINE

## 2019-02-17 PROCEDURE — 82962 GLUCOSE BLOOD TEST: CPT

## 2019-02-17 PROCEDURE — 74011000250 HC RX REV CODE- 250: Performed by: NURSE PRACTITIONER

## 2019-02-17 PROCEDURE — 85610 PROTHROMBIN TIME: CPT

## 2019-02-17 PROCEDURE — 65610000006 HC RM INTENSIVE CARE

## 2019-02-17 PROCEDURE — 80069 RENAL FUNCTION PANEL: CPT

## 2019-02-17 RX ORDER — MIDODRINE HYDROCHLORIDE 2.5 MG/1
7.5 TABLET ORAL EVERY 8 HOURS
Status: DISCONTINUED | OUTPATIENT
Start: 2019-02-17 | End: 2019-02-23 | Stop reason: HOSPADM

## 2019-02-17 RX ADMIN — OXYCODONE HYDROCHLORIDE 5 MG: 5 TABLET ORAL at 20:20

## 2019-02-17 RX ADMIN — BUDESONIDE 500 MCG: 0.5 INHALANT RESPIRATORY (INHALATION) at 20:37

## 2019-02-17 RX ADMIN — IPRATROPIUM BROMIDE AND ALBUTEROL SULFATE 3 ML: .5; 3 SOLUTION RESPIRATORY (INHALATION) at 20:37

## 2019-02-17 RX ADMIN — IPRATROPIUM BROMIDE AND ALBUTEROL SULFATE 3 ML: .5; 3 SOLUTION RESPIRATORY (INHALATION) at 15:35

## 2019-02-17 RX ADMIN — Medication 1 TABLET: at 10:57

## 2019-02-17 RX ADMIN — ARFORMOTEROL TARTRATE 15 MCG: 15 SOLUTION RESPIRATORY (INHALATION) at 08:13

## 2019-02-17 RX ADMIN — MIDODRINE HYDROCHLORIDE 7.5 MG: 2.5 TABLET ORAL at 15:07

## 2019-02-17 RX ADMIN — LACTULOSE 10 G: 20 SOLUTION ORAL at 16:50

## 2019-02-17 RX ADMIN — RIFAXIMIN 550 MG: 550 TABLET ORAL at 20:20

## 2019-02-17 RX ADMIN — LACTULOSE 10 G: 20 SOLUTION ORAL at 22:10

## 2019-02-17 RX ADMIN — IPRATROPIUM BROMIDE AND ALBUTEROL SULFATE 3 ML: .5; 3 SOLUTION RESPIRATORY (INHALATION) at 03:08

## 2019-02-17 RX ADMIN — MIDODRINE HYDROCHLORIDE 7.5 MG: 2.5 TABLET ORAL at 22:10

## 2019-02-17 RX ADMIN — CEFTRIAXONE 1 G: 1 INJECTION, POWDER, FOR SOLUTION INTRAMUSCULAR; INTRAVENOUS at 17:19

## 2019-02-17 RX ADMIN — IPRATROPIUM BROMIDE AND ALBUTEROL SULFATE 3 ML: .5; 3 SOLUTION RESPIRATORY (INHALATION) at 11:55

## 2019-02-17 RX ADMIN — IPRATROPIUM BROMIDE AND ALBUTEROL SULFATE 3 ML: .5; 3 SOLUTION RESPIRATORY (INHALATION) at 08:13

## 2019-02-17 RX ADMIN — RIFAXIMIN 550 MG: 550 TABLET ORAL at 10:57

## 2019-02-17 RX ADMIN — ARFORMOTEROL TARTRATE 15 MCG: 15 SOLUTION RESPIRATORY (INHALATION) at 20:37

## 2019-02-17 RX ADMIN — INSULIN LISPRO 2 UNITS: 100 INJECTION, SOLUTION INTRAVENOUS; SUBCUTANEOUS at 16:50

## 2019-02-17 RX ADMIN — LACTULOSE 10 G: 20 SOLUTION ORAL at 10:58

## 2019-02-17 RX ADMIN — MIDODRINE HYDROCHLORIDE 5 MG: 2.5 TABLET ORAL at 05:30

## 2019-02-17 RX ADMIN — SODIUM CHLORIDE 40 MG: 9 INJECTION, SOLUTION INTRAMUSCULAR; INTRAVENOUS; SUBCUTANEOUS at 10:54

## 2019-02-17 RX ADMIN — BUDESONIDE 500 MCG: 0.5 INHALANT RESPIRATORY (INHALATION) at 08:13

## 2019-02-17 NOTE — PROGRESS NOTES
Problem: Dysphagia (Adult) Goal: *Acute Goals and Plan of Care (Insert Text) Recommendations: 
Diet: Puree/thin Meds: Crushed in puree Aspiration Precautions Oral Care TID Other: Feeding assistance Goals:  Patient will: 1. Tolerate PO trials with 0 s/s overt distress in 4/5 trials 2. Utilize compensatory swallow strategies/maneuvers (decrease bite/sip, size/rate, alt. liq/sol) with min cues in 4/5 trials 3. Perform oral-motor/laryngeal exercises to increase oropharyngeal swallow function with min cues 4. Complete an objective swallow study (i.e., MBSS) to assess swallow integrity, r/o aspiration, and determine of safest LRD, min A Outcome: Progressing Towards Goal 
Speech LAnguage Pathology bedside swallow evaluation Patient: Dhruv Isaac (44 y.o. female) Date: 2/17/2019 Primary Diagnosis: UTI (urinary tract infection) [N39.0] Metabolic encephalopathy [F59.80] Procedure(s) (LRB): ENDOSCOPIC ULTRASOUND (EUS) (N/A) 4 Days Post-Op Precautions: Aspiration  Fall, Skin PLOF: Independent ASSESSMENT : 
Based on the objective data described below, the patient presents with mod-severe oral and mild pharyngeal dysphagia. A&O to self. Inconsistent command following, poor safety awareness. Oral-motor exam revealed pt edentulous; however, all other structures grossly intact for mastication and deglutition. Family reports dentures will be brought in from home tonight. Upon entering room, pt being fed mech-soft meal by family member. Pt with audible swallow of thin liquid, 0 overt s/sx of aspiration. Solid trial; demo incomplete mastication of 100% of bolus, oral pocketing; unable to propel posteriorly despite multiple sips thin liquid wash. Pt refused to expel bolus; SLP removed with RN assistance. Pt became aggressive, attempting to scratch and hit RN. Recommend diet change to puree, thin liquid with aspiration precautions, feeding assistance.  SLP will follow as indicated to assess diet tolerance, advancement and education. Patient will benefit from skilled intervention to address the above impairments. Patients rehabilitation potential is considered to be Guarded Factors which may influence rehabilitation potential include:  
[]            None noted [x]            Mental ability/status [x]            Medical condition []            Home/family situation and support systems [x]            Safety awareness 
[]            Pain tolerance/management []            Other: PLAN : 
Recommendations and Planned Interventions: 
Puree/thin Frequency/Duration: Patient will be followed by speech-language pathology 1-2 times per day/4-7 days per week to address goals. Discharge Recommendations: To Be Determined SUBJECTIVE:  
Patient stated You're both going to hell. OBJECTIVE:  
 
Past Medical History:  
Diagnosis Date  CAD (coronary artery disease) RCA TAMMY (2015)  Cardiomyopathy ([facility]  CHF (congestive heart failure) ([facility] [facility]  Chronic obstructive pulmonary disease ([facility] [facility]  Diabetes ([facility] [facility]  ESRD on dialysis ([facility] [address]  HLD (hyperlipidemia)  Hypertension  Liver disease  S/P heart valve repair Past Surgical History:  
Procedure Laterality Date  CARDIAC SURG PROCEDURE UNLIST  [place] [facility] ENDOSCOPIC ULTRASOUND  2/13/2019 Prior Level of Function/Home Situation: Living with family Home Situation Home Environment: Apartment # Steps to Enter: 1 One/Two Story Residence: One story Living Alone: No 
Support Systems: Family member(s) Patient [facility] be [facility] Used/Available at Home: Sacha Hutch, Glucometer, Elder-Atkins Diet prior to admission: Regular/thin Current Diet:  Puree/thin Cognitive and Communication Status: 
Neurologic State: Alert Orientation Level: Oriented to person, Disoriented to place, Disoriented to situation, Disoriented to time Cognition: Impaired decision making, Poor safety awareness, Decreased command following, Decreased attention/concentration Perception: Appears intact Perseveration: Perseverates during conversation Safety/Judgement: Decreased awareness of environment, Decreased awareness of need for assistance, Decreased awareness of need for safety, Decreased insight into deficits Oral Assessment: 
Oral Assessment Labial: Decreased rate Dentition: Edentulous Oral Hygiene: Floyd Sleigh Lingual: No impairment Velum: No impairment Mandible: No impairment P.O. Trials: 
Patient Position: [id number] Vocal quality prior to P.O.: No impairment Consistency Presented: Thin liquid; Solid;Puree How Presented: SLP-fed/presented; Successive swallows;Straw Bolus Acceptance: No impairment Bolus Formation/Control: Impaired Type of Impairment: Incomplete;Mastication Propulsion: Delayed (# of seconds) Oral Residue: Greater than 50% of bolus;Pocketing Initiation of Swallow: Delayed (# of seconds) Laryngeal Elevation: Decreased Aspiration Signs/Symptoms: None Pharyngeal Phase Characteristics: Multiple swallows; Poor endurance Effective Modifications: Alternate liquids/solids;Small sips and bites Cues for Modifications: Maximal 
  
Oral Phase Severity: Moderate-severe Pharyngeal Phase Severity : Mild The severity rating is based on the following outcomes: SHANE Noms Swallow Level 4 Clinical Judgement PAIN: 
Start of Eval: 0 End of Eval: 0 After treatment:  
[]            Patient left in no apparent distress sitting up in chair 
[x]            Patient left in no apparent distress in bed 
[x]            Call bell left within reach [x]            Nursing notified []            Family present 
[]            Caregiver present 
[]            Bed alarm activated COMMUNICATION/EDUCATION:  
[x]            Safe swallowing guidelines; compensatory techniques.  
[x]            Patient/family have participated as able in goal setting and plan of care. []            Patient/family agree to work toward stated goals and plan of care. [x]            Patient understands intent and goals of therapy; neutral about participation. []            Patient unable to participate in goal setting/plan of care; educ ongoing with interdisciplinary staff 
[]         Posted safety precautions in patient's room. Thank you for this referral. 
Ludin Martinez, SLP Time Calculation: 23 mins

## 2019-02-17 NOTE — PROGRESS NOTES
Gastrointestinal Progress Note Patient Name: Derrell Herrera Today's Date: 2/17/2019 Admit Date: 2/8/2019 Assessment-Recommendation: 1. Abnormal liver enzymes. Continue supportive care. Monitor bilirubin. Maximized nutrition. Continue antibiotics. 2. Anemia - No signs of GI bleeding. Continue GI prophylaxis. Subjective:  
 
Denied pain. Patient removed ET tube yesterday. Current Facility-Administered Medications Medication Dose Route Frequency  midodrine (PROAMITINE) tablet 7.5 mg  7.5 mg Oral Q8H  
 oxyCODONE IR (ROXICODONE) tablet 5 mg  5 mg Oral Q6H PRN  
 insulin lispro (HUMALOG) injection   SubCUTAneous AC&HS  pantoprazole (PROTONIX) 40 mg in sodium chloride 0.9% 10 mL injection  40 mg IntraVENous DAILY  glucose chewable tablet 16 g  4 Tab Oral PRN  
 glucagon (GLUCAGEN) injection 1 mg  1 mg IntraMUSCular PRN  
 dextrose (D50) infusion 12.5-25 g  25-50 mL IntraVENous PRN  
 albuterol-ipratropium (DUO-NEB) 2.5 MG-0.5 MG/3 ML  3 mL Nebulization Q4H RT  
 NOREPINephrine (LEVOPHED) 8 mg in 0.9% NS 250ml infusion  2-16 mcg/min IntraVENous TITRATE  epoetin briseida (EPOGEN;PROCRIT) injection 10,000 Units  10,000 Units IntraVENous DIALYSIS MON, WED & FRI  rifAXIMin (XIFAXAN) tablet 550 mg  550 mg Oral BID  lactulose (CHRONULAC) solution 10 g  15 mL Oral TID  cefTRIAXone (ROCEPHIN) 1 g in 0.9% sodium chloride (MBP/ADV) 50 mL MBP  1 g IntraVENous Q24H  B complex-vitamin C-folic acid (NEPHRO-NACHO) 0.8 mg tab  1 Tab Oral DAILY  arformoterol (BROVANA) neb solution 15 mcg  15 mcg Nebulization BID RT And  
 budesonide (PULMICORT) 500 mcg/2 ml nebulizer suspension  500 mcg Nebulization BID RT  
  
 
 
Objective:  
 
Physical Exam: 
 
 
Alert and Awake Responded to yes or no commands CV RRR Abdomen soft ND NT Data Review: 
 
Labs: Results:  
   
Chemistry Recent Labs  
  02/17/19 
0335 02/16/19 
0310 02/15/19 
0350 * 100* 104*  140 138 K 3.8 3.8 3.7 CL 99* 100 100 CO2 25 19* 16*  
BUN 20* 14 22* CREA 3.90* 2.66* 4.31* CA 8.8 8.3* 8.0* AGAP 13 21* 22* BUCR 5* 5* 5* AP  --   --  467* TP  --   --  5.4* ALB 1.7* 1.7* 1.8*  1.8*  
GLOB  --   --  3.6 AGRAT  --   --  0.5* CBC w/Diff Recent Labs  
  02/17/19 
0335 02/16/19 
0310 02/15/19 
0350 WBC 7.9 7.6 9.4  
RBC 2.65* 2.77* 2.75* HGB 7.0* 7.3* 7.2* HCT 21.3* 21.8* 21.5*  
PLT 91* 92* 85* GRANS 68 72 76* LYMPH 17* 16* 14* EOS 2 1 1 Coagulation Recent Labs  
  02/17/19 0335 02/16/19 0310 PTP 16.6* 17.3* INR 1.4* 1.4* Liver Enzymes Recent Labs  
  02/17/19 
0335  02/15/19 
0350 TP  --   --  5.4* ALB 1.7*   < > 1.8*  1.8* AP  --   --  467* SGOT  --   --  29 ALT  --   --  25  
 < > = values in this interval not displayed. Khalida Stewart MD 
February 17, 2019

## 2019-02-17 NOTE — PROGRESS NOTES
Problem: Pressure Injury - Risk of 
Goal: *Prevention of pressure injury Document Honorio Scale and appropriate interventions in the flowsheet. Outcome: Progressing Towards Goal 
Pressure Injury Interventions: 
Sensory Interventions: Assess need for specialty bed Moisture Interventions: Absorbent underpads, Apply protective barrier, creams and emollients Activity Interventions: Assess need for specialty bed Mobility Interventions: Assess need for specialty bed Nutrition Interventions: Document food/fluid/supplement intake Friction and Shear Interventions: Apply protective barrier, creams and emollients, Foam dressings/transparent film/skin sealants Problem: Falls - Risk of 
Goal: *Absence of Falls Document Shayla Melo Fall Risk and appropriate interventions in the flowsheet. Outcome: Progressing Towards Goal 
Fall Risk Interventions: 
Mobility Interventions: Bed/chair exit alarm Mentation Interventions: Bed/chair exit alarm Medication Interventions: Bed/chair exit alarm Elimination Interventions: Call light in reach, Toileting schedule/hourly rounds History of Falls Interventions: Bed/chair exit alarm

## 2019-02-17 NOTE — PROGRESS NOTES
Problem: Pressure Injury - Risk of 
Goal: *Prevention of pressure injury Document Honorio Scale and appropriate interventions in the flowsheet. Outcome: Progressing Towards Goal 
Pressure Injury Interventions: 
Sensory Interventions: Assess need for specialty bed Moisture Interventions: Apply protective barrier, creams and emollients, Absorbent underpads Activity Interventions: Assess need for specialty bed Mobility Interventions: Assess need for specialty bed, PT/OT evaluation Nutrition Interventions: Document food/fluid/supplement intake Friction and Shear Interventions: Feet elevated on foot rest, Foam dressings/transparent film/skin sealants Problem: Falls - Risk of 
Goal: *Absence of Falls Document Orvel Buffy Fall Risk and appropriate interventions in the flowsheet. Outcome: Progressing Towards Goal 
Fall Risk Interventions: 
Mobility Interventions: Bed/chair exit alarm Mentation Interventions: Adequate sleep, hydration, pain control Medication Interventions: Bed/chair exit alarm Elimination Interventions: Call light in reach History of Falls Interventions: Bed/chair exit alarm

## 2019-02-17 NOTE — PROGRESS NOTES
Internal Medicine Progress Note Patient's Name: Agustín Bernard Admit Date: 2/8/2019 Length of Stay: 9 Assessment/Plan Principal Problem: 
  SBP (spontaneous bacterial peritonitis) ([address] (2/12/2019) Active Problems: Metabolic encephalopathy (4/3/0785) UTI (urinary tract infection) (2/8/2019) Cirrhosis ([address] (2/8/2019) ESRD (end stage renal disease) on dialysis ([address] (2/8/2019) COPD (chronic obstructive pulmonary disease) ([address] (2/8/2019) Hyperbilirubinemia (2/8/2019) Combined systolic and diastolic heart failure ([address] (2/10/2019) Cholestatic jaundice (2/12/2019) Hypotension (2/13/2019) Anemia, chronic disease (2/15/2019) Coagulopathy ([address] (2/15/2019) Acute respiratory failure ([address] (2/15/2019) Shock ([address] (2/15/2019) Thrombocytopenia ([address] (2/16/2019) Ileus ([address] (2/17/2019) SBP 
- cont rocephin 
- paracentesis done 2/11, cell count profile consistent with bacterial peritonitis 
  
ESRD on dialysis 
- nephro consult - appreciate assistance 
- HD on 2/9 
- hold dialysis on 2/12 d/t HOTN - midodrine added 
- dialysis done 2/13, albumin given, no fluid pulled off 
  
Cholestatic jaundice 
- CT abd results below - GI consult - appreciate assistance - serologies, paracentesis to eval fluid, start lactulose/rifaximin, vit K x3 days - MRCP w/o contrast, transjugular liver bx ordered - MRCP unable to be done - ERCP done 2/13 - results below, GI recs medical management of decompensated chronic liver disease with cholestasis - cytology? Brushings? 
  
UTI 
- iv rocephin x5 days - UCx - negative 
  
Metabolic encephalopathy 
- Head CT - no acute abnormality 
- monitor neuro status - worsened on 2/13 likely secondary to anesthesia - w/d to pain 
  
HF 
- cardio consult - appreciate assistance 
  
COPD 
- brovana, pulmicort 
  
  
Acute Resp failure 
-Intubated 
-Sedated 
  
Shock 
-Levophed - Cont acceptable home medications for chronic conditions  
- DVT protocol I have personally reviewed all pertinent labs and films that have officially resulted over the last 24 hours. I have personally checked for all pending labs that are awaiting final results. Interval History Tobe Koyanagi a 46 y.o. female with a PMHx ESRD on dialysis, DM, CAD, COPD, syst/diastolic heart failure, HTN, chronic liver disease who presented to the ED from dialysis due to AMS. History limited 2/2 AMS. ED eval revealed scleral icterus, confusion, leukocytosis, UTI. Hyperbilirubinemia (21.7) and elevated alk phos (571) are similar to recent ED visit at [facility] 2/1. Review of prior records from [facility] revealed she was supposed to have a percutaneous gallbladder drain placed on 2/4/19. Head CT neg. Pt will be admitted for further evaluation. IV rocephin started. Nephrology consulted - HD on 2/9 as patient missed the prior day. CT abd without contrast ordered, results below. GI consulted - serologies, paracentesis to eval fluid, start lactulose/rifaximin, vit K x3 days, cardio consult. UCx negative. PT recommending SNF. Paracentesis performed 2/11 - removed 100cc dark jed fluid, cell count profile consistent with SBP. Pt already on rocephin. Fluid cx ngtd. Midodrine added for HOTN. MRCP w/o contrast and transjugular liver bx ordered per GI. MRCP unable to be done 2/2 patient not cooperating. ERCP done on 2/13 instead, no CBD stone or stricture seen. Patient transferred to ICU afterwards 2/2 HOTN and need for pressors instead of IVF due to ESRD. ICU team concerned about AMS and pt's ability to protect airway so patient was intubated. Pt's daughter informed of recent events, wishes for patient to remain full code.   Dialysis 2/15. SBT, patient self extubated. Remains on pressors but requiring less. Passed swallow eval.  
  
 
 
 
 
Subjective Pt s/e @ bedside.  Self extubated 2/16, requiring less pressors, sating 100% 2L. No complaints. Objective Visit Vitals /64 Pulse 94 Temp 97.5 °F (36.4 °C) Resp 26 Ht 5' 5\" (1.651 m) Wt 53.8 kg (118 lb 9.7 oz) SpO2 100% BMI 19.74 kg/m² Physical Exam: 
General Appearance: NAD, awake, alert HENT: normocephalic/atraumatic, moist mucus membranes Neck: No JVD, supple Lungs: diminished breath sounds but otherwise clear, no wheeze, crackles, or rhonchi CV: RRR, no m/r/g Abdomen: soft, non-tender, normal bowel sounds Extremities: no cyanosis, no peripheral edema Neuro: No focal deficits, motor/sensory intact Skin: Normal color, intact Psych: A/O x2 person, place, Intake and Output: 
Current Shift:  02/17 0701 - 02/17 1900 In: 741.8 [P.O.:620; I.V.:121.8] Out: - Last three shifts:  02/15 1901 - 02/17 0700 In: 1148.5 [I.V.:938.5] Out: 400 Lab/Data Reviewed: All lab results for the last 24 hours reviewed. Imaging Reviewed: 
Xr Abd (kub) Result Date: 2/17/2019 EXAM: AP supine view of the abdomen INDICATION: Abdominal pain COMPARISON: None _______________ FINDINGS: The bowel gas pattern is within normal limits. There are no abnormal calcifications. Osseous structures are within normal limits. _______________ IMPRESSION: No significant abnormalities Medications Reviewed: 
Current Facility-Administered Medications Medication Dose Route Frequency  midodrine (PROAMITINE) tablet 7.5 mg  7.5 mg Oral Q8H  
 oxyCODONE IR (ROXICODONE) tablet 5 mg  5 mg Oral Q6H PRN  
 insulin lispro (HUMALOG) injection   SubCUTAneous AC&HS  pantoprazole (PROTONIX) 40 mg in sodium chloride 0.9% 10 mL injection  40 mg IntraVENous DAILY  glucose chewable tablet 16 g  4 Tab Oral PRN  
 glucagon (GLUCAGEN) injection 1 mg  1 mg IntraMUSCular PRN  
 dextrose (D50) infusion 12.5-25 g  25-50 mL IntraVENous PRN  
 albuterol-ipratropium (DUO-NEB) 2.5 MG-0.5 MG/3 ML  3 mL Nebulization Q4H RT  
  NOREPINephrine (LEVOPHED) 8 mg in 0.9% NS 250ml infusion  2-16 mcg/min IntraVENous TITRATE  epoetin briseida (EPOGEN;PROCRIT) injection 10,000 Units  10,000 Units IntraVENous DIALYSIS MON, WED & FRI  rifAXIMin (XIFAXAN) tablet 550 mg  550 mg Oral BID  lactulose (CHRONULAC) solution 10 g  15 mL Oral TID  cefTRIAXone (ROCEPHIN) 1 g in 0.9% sodium chloride (MBP/ADV) 50 mL MBP  1 g IntraVENous Q24H  B complex-vitamin C-folic acid (NEPHRO-NACHO) 0.8 mg tab  1 Tab Oral DAILY  arformoterol (BROVANA) neb solution 15 mcg  15 mcg Nebulization BID RT And  
 budesonide (PULMICORT) 500 mcg/2 ml nebulizer suspension  500 mcg Nebulization BID RT Priya Win PA-C [facility] Pager: [phone] Office: [phone]

## 2019-02-17 NOTE — PROGRESS NOTES
[facility] ICU Progress Note Name: Marissa Mendoza : 1970 MRN: [id number] Date: 2019 1:14 PM 
  
[x]I have reviewed the flowsheet and previous days notes. Events overnight reviewed and discussed with nursing staff. Vital signs and records reviewed. HPI 
45 y/o female w/ PMHX of ESRD on HD, COPD, CHF and chronic liver disease initially admitted for altered mental status 19. Per chart review, pt was unresponsive yesterday, went for MRCP, became hypotensive, unable to tolerate HD and was sent to ICU for management of pressor. Pt was emergently intubated and CVL placed in ICU. Subjective: 
19 S/p self-extubation 19 Awake, alert to self and place Remains on pressor Stable on 3L NC Passed bedside swallow eval 
 
[x]The patient is critically ill on     
[]Mechanical ventilation [x]Pressors []BiPAP [] ROS:A comprehensive review of systems was negative except for that written in the HPI. Medication Review: · Pressors - Levo · Sedation - none · Antibiotics - Rocephin complete · Pain - not an issue · GI/ DVT - SCDs · Others (other gtts) Safety Bundles: Severe Sepsis Protocol/ Electrolyte Replacement Protocol Vital Signs:   
Visit Vitals /52 Pulse 90 Temp 97.7 °F (36.5 °C) Resp 20 Ht 5' 5\" (1.651 m) Wt 53.8 kg (118 lb 9.7 oz) SpO2 100% BMI 19.74 kg/m² O2 Device: Nasal cannula O2 Flow Rate (L/min): 3 l/min Temp (24hrs), Av.3 °F (36.8 °C), Min:97.1 °F (36.2 °C), Max:99.3 °F (37.4 °C) Intake/Output:  
Last shift:      No intake/output data recorded. Last 3 shifts: 02/15 1901 -  0700 In: 1148.5 [I.V.:938.5] Out: 400 Intake/Output Summary (Last 24 hours) at 2019 1004 Last data filed at 2019 0200 Gross per 24 hour Intake 311.15 ml Output  Net 311.15 ml Ventilator Settings: 
Ventilator Mode: Assist control, VC+ Respiratory Rate Resp Rate Observed: 30 
 Back-Up Rate: 14 Insp Time (sec): 1 sec I:E Ratio: 1:1.9 Ventilator Volumes Vt Set (ml): 400 ml Vt Exhaled (Machine Breath) (ml): 451 ml Ve Observed (l/min): 8.74 l/min Ventilator Pressures PIP Observed (cm H2O): 20 cm H2O Plateau Pressure (cm H2O): 19 cm H2O 
MAP (cm H2O): 10 PEEP/VENT (cm H2O): 5 cm H20 Auto PEEP Observed (cm H2O): 0 cm H2O Physical Exam: 
 
General: Awake, alert HEENT:  Pink palpebral conjunctivae; mucosa moist 
Resp:  Clear bilaterally to auscultation; Diminished in bases; Symmetrical chest expansion, no accessory muscle use; good airway entry; no rales/ wheezing/ rhonchi noted CV:  S1, S2 present; regular rate and rhythm; NSR 
GI:  Abdomen soft, distended; non-tender; (+) active bowel sounds Extremities:  +2 pulses on all extremities; no edema/ cyanosis/ clubbing noted; normal capillary refill Skin:  Warm; no rashes/ lesions noted Neurologic:  Non-focal; alert to self and place, follows commands, moves all extremities Devices:  ETT, CVL, HD, OGT 
 
DATA:  
 
Current Facility-Administered Medications Medication Dose Route Frequency  oxyCODONE IR (ROXICODONE) tablet 5 mg  5 mg Oral Q6H PRN  
 insulin lispro (HUMALOG) injection   SubCUTAneous AC&HS  pantoprazole (PROTONIX) 40 mg in sodium chloride 0.9% 10 mL injection  40 mg IntraVENous DAILY  glucose chewable tablet 16 g  4 Tab Oral PRN  
 glucagon (GLUCAGEN) injection 1 mg  1 mg IntraMUSCular PRN  
 dextrose (D50) infusion 12.5-25 g  25-50 mL IntraVENous PRN  
 albuterol-ipratropium (DUO-NEB) 2.5 MG-0.5 MG/3 ML  3 mL Nebulization Q4H RT  
 NOREPINephrine (LEVOPHED) 8 mg in 0.9% NS 250ml infusion  2-16 mcg/min IntraVENous TITRATE  epoetin briseida (EPOGEN;PROCRIT) injection 10,000 Units  10,000 Units IntraVENous DIALYSIS MON, WED & FRI  rifAXIMin (XIFAXAN) tablet 550 mg  550 mg Oral BID  midodrine (PROAMITINE) tablet 5 mg  5 mg Oral Q8H  
 lactulose (CHRONULAC) solution 10 g  15 mL Oral TID  cefTRIAXone (ROCEPHIN) 1 g in 0.9% sodium chloride (MBP/ADV) 50 mL MBP  1 g IntraVENous Q24H  B complex-vitamin C-folic acid (NEPHRO-NACHO) 0.8 mg tab  1 Tab Oral DAILY  arformoterol (BROVANA) neb solution 15 mcg  15 mcg Nebulization BID RT And  
 budesonide (PULMICORT) 500 mcg/2 ml nebulizer suspension  500 mcg Nebulization BID RT Labs: Results:  
   
Chemistry Recent Labs  
  02/17/19 
0335 02/16/19 
0310 02/15/19 
0350 * 100* 104*  140 138  
K 3.8 3.8 3.7 CL 99* 100 100 CO2 25 19* 16*  
BUN 20* 14 22* CREA 3.90* 2.66* 4.31* CA 8.8 8.3* 8.0* AGAP 13 21* 22* BUCR 5* 5* 5* AP  --   --  467* TP  --   --  5.4* ALB 1.7* 1.7* 1.8*  1.8*  
GLOB  --   --  3.6 AGRAT  --   --  0.5* CBC w/Diff Recent Labs  
  02/17/19 
0335 02/16/19 
0310 02/15/19 
0350 WBC 7.9 7.6 9.4  
RBC 2.65* 2.77* 2.75* HGB 7.0* 7.3* 7.2* HCT 21.3* 21.8* 21.5*  
PLT 91* 92* 85* GRANS 68 72 76* LYMPH 17* 16* 14* EOS 2 1 1 Coagulation Recent Labs  
  02/17/19 0335 02/16/19 0310 PTP 16.6* 17.3* INR 1.4* 1.4* Liver Enzymes Recent Labs  
  02/17/19 
0335  02/15/19 
0350 TP  --   --  5.4* ALB 1.7*   < > 1.8*  1.8* AP  --   --  467* SGOT  --   --  29  
 < > = values in this interval not displayed. ABG Lab Results Component Value Date/Time PHI 7.405 02/16/2019 03:36 PM  
 PCO2I 29.6 (L) 02/16/2019 03:36 PM  
 PO2I 195 (H) 02/16/2019 03:36 PM  
 HCO3I 18.5 (L) 02/16/2019 03:36 PM  
 FIO2I 36 02/16/2019 03:36 PM  
  
Microbiology No results for input(s): CULT in the last 72 hours. Telemetry: [x]Sinus []A-flutter []Paced []A-fib []Multiple PVCs Imaging: CXR Results  (Last 48 hours) 02/16/19 0430  XR CHEST PORT Final result Impression:  IMPRESSION:  
   
   
1. Persistent left basilar opacity that may represent atelectasis or airspace  
disease 2. Support devices as above Narrative:  EXAM: AP radiograph of the chest  
   
INDICATION: Respiratory failure COMPARISON: February 15, 2019  
   
_______________ FINDINGS:  
   
A nasogastric tube is positioned in the stomach. A right IJ approach central  
venous catheter is unchanged near the right atrium. The endotracheal tube is 2  
cm above the sheryl. Stable appearance of the cardiac silhouette and mediastinal  
contour. There is left basilar opacity not significantly changed from the prior  
study. The right lung is clear. There is no pneumothorax or pleural effusion. _______________ CT Results  (Last 48 hours) None IMPRESSION:  
· Acute respiratory failure s/p self extubation 2/16/19 · Shock- on Levo · End stage liver disease · Spontaneous Bacterial Peritonitis- on Ceftriaxone · ESRD · Encephalopathy- on Rifaxamin · CHF · UTI · Thrombocytopenia · COPD Hx 
· CAD · COPD · DM 
· ESRD 
· HLD 
· HTN 
· S/p heart valve repair PLAN:  
· Resp -  Stable on 3L NC. O2 goal > 90. HOB > 30. Aspiration precautions. Duo-nebs, Brovana, Pulmicort. · ID - Sepsis bundle. Afebrile and aleukocytosis. ABx complete for SBP. · CVS - BP requiring pressor. MAP > 60. Midodrine. Monitor HD status. · Heme/onc - Thrombocytopenia- improving. Daily CBC. Monitor H/H and platelets. · Metabolic - Daily BMP. Monitor electrolytes. · Renal - Trend renal indices. Anuric. Procrit, Nephro-briseida. Nephrology following. · Endocrine - SSI, BS Q6 HR. · Neuro/ Pain/ Sedation - Monitor neuro status. · GI - Passed bedside swallow eval- clear liquid diet. SLP today. Rifaxmin, Lactulose. GI following. · Prophylaxis - DVT, GI- protonix, SCDs · Discussed in interdisciplinary rounds · Code status- FULL [x]See my orders for details My assessment/plan was discussed with: 
[x]nursing []PT/OT [x]respiratory therapy [x]Dr. Zbigniew Valencia []family [] [x]Total critical care time exclusive of procedures   40    minutes Josh Hassan, NP

## 2019-02-17 NOTE — PROGRESS NOTES
Physical Exam  
Eyes: Scleral icterus is present. Periorbital edema present around both eyes Abdominal: She exhibits ascites. Skin:  
 
  
Bedside change of shift report was given to me, Neil Cook RN  ( ICU night shift nurse), Dania Caballero RN & Fanny Silva RN  ( ICU day shift nurse). Report included the following information:  SBAR, kardex, consultations, hemodynamic monitoring, intake/output, MAR, lab results, VS trends, cardiac rhythm noted NSR/ST. Skin, neuro, respiratory status, order verification, and critical IV gtt rate verification has been dually noted and verified at the bedside with:  Dania Cablalero RN & Fanny Silva RN                                    
NSICU Nurse's Note: 
2000:  Pt is awake, alert x 3 and appropriately able to verbalize and  convey her needs. Family members are present and attentive at the bedside, and pt is enjoying their visit. Pt has notable generalized weakness and spontaneous, purposeful movement to all extremities. Pt does require max assist with all ADLs and repositioning. Bed is locked and in lowest position,side rails up x3, exit alarm activated, call bell is in reach. Interventions are ongoing,will continue to monitor. Neurological: as noted in assessment Cardiovascular: NSR/ST on the monitor. Pulmonary: breath sounds diminished, saturations maintained 100% on 3 LPM via NC 
GI/: Abdomen is distended at baseline with ascites, hypoactive bowel sounds. Pt is presently tolerating a clear liquid diet without incident. Last BM noted 02/12/2018. Pt is anuric at baseline per hx ESRD. IVF/Critical gtts: IV levophed gtt Skin: as noted above 
 
0000:  Accucheck noted 158, SSI administered at this time. Pt was repositioned. Will continue to monitor. 0345: Labs drawn from CVL RIJ, flushed, patent. Pt was suctioned and repositioned. 0445: Pt was given a complete bed bath and tolerated bed linen change.  Pt also tolerated scheduled portable KUB at the bedside and ABG without incident. 0600: Pt continues to tolerate clear liquids as ordered. Bed is locked and in lowest position,side rails up x3, exit alarm activated, call bell is in reach. Interventions are ongoing,will continue to monitor.   
0710: Bedside change of shift report given to: Farrah Cornelius RN and Javan Burnett RN

## 2019-02-17 NOTE — PROGRESS NOTES
0700 Report received at the bedside from [facility] covering SBAR and lines and drains and plan. Patient eating soft food with family member. Tolerating well. Patient intermittently very confused. Patient passing large amounts flatus this day, large BM loose yellow/brown stool. Patient seen by speech this day. Levophed attempt to titrate off occurring very slowly. 1600 Levophed off, patient maintaining appropriate blood pressure at this time. Patient having loose stools and during cleaning removed 'dirty' mepalex, and a small skin tear occurred when removed. New mepalex applied to behind. Patient eating dinner, requires to be fed due to confusion. Patient continues to have intermittent confusion as well as requiring several reminders to remain safe and not climb out of bed. 1930 Report to oncoming RN  At the bedside covering SBAR, lines drains and plan.

## 2019-02-17 NOTE — PROGRESS NOTES
Problem: Mobility Impaired (Adult and Pediatric) Goal: *Acute Goals and Plan of Care (Insert Text) Physical Therapy Goals Re-evaluated 2/17/2019; Updated goals and to be accomplished within 7 day(s) 1. Patient will move from supine to sit and sit to supine  in bed with modified independence. 2.  Patient will transfer from bed to chair and chair to bed with modified independence using the least restrictive device. 3.  Patient will perform sit to stand with modified independence. 4.  Patient will ambulate with modified independence for 50 feet with the least restrictive device. Initiated 2/11/2019 and to be accomplished within 3 day trial.  
If appropriate continue per plan of care to accomplish goals within 7 days. 1.  Patient will maintain eyes open for greater than 90% of session to allow for active participation in mobility training. 2.  Patient will follow 90% of commands to maximize safety and active participation in mobility training. 3.  Patient will move from supine to sit and sit to supine in bed with supervision/set-up. 4.  Patient will maintain seated at edge of bed for 8 min with supervision/set-up to prepare for out of bed activity. 5.  Patient will perform sit to stand with minimal assistance/contact guard assist. 
6.  Patient will transfer from bed to chair and chair to bed with minimal assistance/contact guard assist using the least restrictive device. 7.  Patient will ambulate with minimal assistance/contact guard assist for 10 feet with the least restrictive device. Outcome: Progressing Towards Goal 
PHYSICAL THERAPY: Re-evaluationINPATIENT: Medicare: Hospital Day: 10 Patient: Alfredo Jaimes (50 y.o. female)    Date: 2/17/2019 Primary Diagnosis: UTI (urinary tract infection) [N39.0] Metabolic encephalopathy [N81.12] Procedure(s) (LRB): ENDOSCOPIC ULTRASOUND (EUS) (N/A), 4 Days Post-Op, Precautions: Fall, Skin PLOF: Mod I with ww ASSESSMENT: 
 New orders received; re-evaluation completed with updated goals. Alert with friend at bedside; talkative. Oriented to person, time, and place. Mod A for supine to sit. Supervision for sitting balance at EOB; 8 minutes. Mod A for sit to stand. Maintained standing with min/mod A for 1 minute. Returned to seated EOB. Completed lateral scoot at EOB with mod A. Returned to supine with mod A d/t fatigue. In left sidelying at end of session. Bilateral heels soft; prevalon boots donned at end of session. RN Selvin present. Prior to admission, amb with rollator. Lives with significant other in one story home. Occasionally required assistance with transfers from low surfaces. Discharge rec to skilled nursing for subacute rehab to increase independence with mobility and return to prior function. BP pre mobility 99/55 BP post mobility 107/45 EDUCATION:  
Education:  Patient was educated on the following topics: Bed mobility, transfers, ADLs, balance, amb, safety, exercise, role of PT, plan of care, cognition, skin integrity, vitals Barriers to Learning/Limitations: None Compensate with: visual, verbal, tactile, kinesthetic cues/model Patient's progression toward goals since last assessment: s/p transfer to ICU and intubation; self-extubated 2/16 now on 2L O2 and IV pressors for hypotension PLAN: 
Goals have been updated based on progression since last assessment. Patient continues to benefit from skilled intervention to address the above impairments. Continue to follow the patient 3-5 times a week to address goals. Planned Interventions: 
[x]     Bed Mobility Training          [x]     Neuromuscular Re-Education 
[x]     Transfer Training                []    Orthotic/Prosthetic Training 
[x]     Gait Training                       []     Modalities [x]     Therapeutic Exercises       []     Edema Management/Control 
[x]     Therapeutic Activities         [x]     Patient and Family Training/Education []     Other (comment): 
Discharge Recommendations: Santhosh Mark Further Equipment Recommendations for Discharge: rollator SUBJECTIVE:  
Patient stated What's your name?  OBJECTIVE DATA SUMMARY:  
 
Critical Behavior: 
Neurologic State: Eyes open spontaneously Orientation Level: Oriented to person, Oriented to place, Oriented to time Cognition: Follows commands Safety/Judgement: Fall prevention Manual Muscle Testing (LE) 
       R     L Hip Flexion:   3+/5  3+/5 Knee EXT:   4/5  4/5 Knee FLEX:   4-/5  4-/5 Ankle DF:   3+/5  3+/5 
_________________________________________________ Tone : BLE normalSensation: Intact to light touch BLE Range Of Motion: BLE AROM [facility] Functional Mobility: 
 
 
Functional Status Indep (I) Mod I Super-vision Min A Mod A Max A Total A Assist x2 Verbal cues Additional time Not tested Comments Rolling []  []  [x] []    []    []  []  [] [] [] [] Supine to sit []  []  [] []  [x]  []  []  [] [] [] [] Sit to supine []  []  [] []  [x]  []  []  [] [] [] [] Sit to stand []  []  [] []  [x]  []  []  [] [] [] []   
Stand to sit []  []  [] []  [x]  []  []  [] [] [] [] Bed to chair transfers []  []  [] []  []  []  []  [] [] [] [x] Balance Good Ford Lace Poor Unable Not tested Comments Sitting static [x]  []  []  []  [] Sitting dynamic [x]  []  []  []  []   
Standing static []  [x]  []  []  []   
Standing dynamic []  []  []  []  [x] Neuro Re-Education: 
Seated EOB 8 minutes Standing 1 minuteTherapeutic Exercises:  
Sit to stand Pain: 
Pre treatment pain:  0 Post treatment pain:  0 Pain Scale 1: Numeric (0 - 10) Pain Intensity 1: 0 Activity Tolerance:  
Fair Please refer to the flowsheet for vital signs taken during this treatment. After treatment:  
[]  Patient left in no apparent distress sitting up in chair 
[x]  Patient left in no apparent distress in bed 
[x]  Call bell left within reach [x]  Nursing notified and present 
[]  Caregiver present 
[]  Bed alarm activated Alaina Gutierrez, PT Time Calculation: 26 mins

## 2019-02-17 NOTE — PROGRESS NOTES
Renal Progress Note Follow up of ESRD Assessment/Plan: 1. ESRD. on HD MWF, Next HD tomorrow 2. Hypotension due to sbp/liver failure/sepsis. Continue midodrin/ off pressors 3. HAGMA, improved 4. Resp failure. Self extubated,  
5. Anemia of ckd. Continue greg with dialysis. 6. Secondary hyperparathyroidism of ckd. Continue to hold sensipar due to hypocalcemia. 7 Liver cirrhosis/ascites/sbp. On abx. GI on the case. EGD results noted- duodenal ulcers, no cbd stone. 8. Systolic chf. compensated 9. UTI. On abx. Please call with questions, 
 
Diamond Leon MD FASN Cell [phone] Pager: [phone] Subjective: 
 
selfextubated Off pressors Patient Active Problem List  
Diagnosis Code  Metabolic encephalopathy I08.33  
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  
 SBP (spontaneous bacterial peritonitis) ([address] K65.2  Hypotension I95.9  Anemia, chronic disease D63.8  Coagulopathy ([address] D68.9  Acute respiratory failure (HCC) J96.00  Shock ([address] R57.9  Thrombocytopenia ([address] D69.6  Ileus ([address] K56.7 Current Facility-Administered Medications Medication Dose Route Frequency Provider Last Rate Last Dose  midodrine (PROAMITINE) tablet 7.5 mg  7.5 mg Oral Q8H Ivette Saenz MD   7.5 mg at 02/17/19 1507  
 oxyCODONE IR (ROXICODONE) tablet 5 mg  5 mg Oral Q6H PRN Ivette Saenz MD   5 mg at 02/16/19 1738  insulin lispro (HUMALOG) injection   SubCUTAneous AC&HS Noman Asencio MD   2 Units at 02/17/19 1650  pantoprazole (PROTONIX) 40 mg in sodium chloride 0.9% 10 mL injection  40 mg IntraVENous DAILY Rebecca Solomon NP   40 mg at 02/17/19 1057  glucose chewable tablet 16 g  4 Tab Oral PRN Melissa Salazar NP      
 glucagon (GLUCAGEN) injection 1 mg  1 mg IntraMUSCular PRN Melanie LAKE NP      
 dextrose (D50) infusion 12.5-25 g  25-50 mL IntraVENous PRN Melanie LAKE NP      
 albuterol-ipratropium (DUO-NEB) 2.5 MG-0.5 MG/3 ML  3 mL Nebulization Q4H RT Andrea Michaels MD   3 mL at 02/17/19 1535  
 NOREPINephrine (LEVOPHED) 8 mg in 0.9% NS 250ml infusion  2-16 mcg/min IntraVENous TITRATE Stefania Herbert MD   Stopped at 02/17/19 1640  epoetin briseida (EPOGEN;PROCRIT) injection 10,000 Units  10,000 Units IntraVENous DIALYSIS KITA WOODSON & Shaun Nichols MD   10,000 Units at 02/15/19 1900  
 rifAXIMin (XIFAXAN) tablet 550 mg  550 mg Oral BID Barbra Silva MD   550 mg at 02/17/19 1057  lactulose (CHRONULAC) solution 10 g  15 mL Oral TID Barbra Silva MD   10 g at 02/17/19 1650  cefTRIAXone (ROCEPHIN) 1 g in 0.9% sodium chloride (MBP/ADV) 50 mL MBP  1 g IntraVENous Q24H Hillary LAKE  mL/hr at 02/16/19 1738 1 g at 02/16/19 1738  B complex-vitamin C-folic acid (NEPHRO-NACHO) 0.8 mg tab  1 Tab Oral DAILY Maria Ines Shen MD   1 Tab at 02/17/19 1057  arformoterol (BROVANA) neb solution 15 mcg  15 mcg Nebulization BID RT Maria Ines Shen MD   15 mcg at 02/17/19 9677 And  budesonide (PULMICORT) 500 mcg/2 ml nebulizer suspension  500 mcg Nebulization BID RT Maria Ines Shen MD   500 mcg at 02/17/19 6157 Objective: 
Vitals:  
 02/17/19 1400 02/17/19 1430 02/17/19 1500 02/17/19 1540 BP: 111/66 90/62 100/64 Pulse: 90 86 94 Resp: 27 20 26 Temp:      
TempSrc:      
SpO2: 100% 100% 96% 100% Weight:      
Height:      
 
. Intake/Output Summary (Last 24 hours) at 2/17/2019 1657 Last data filed at 2/17/2019 1500 Gross per 24 hour Intake 925.14 ml Output  Net 925.14 ml Admission weight:Weight: 61.2 kg (135 lb) (02/08/19 0706) Last Weight Metrics: Weight Loss Metrics 2/17/2019 10/30/2018 Today's Wt 118 lb 9.7 oz 139 lb BMI 19.74 kg/m2 23.13 kg/m2 Physical Exam: On NC Neck: no jvd. LUNGS: good air entry, bl exp rhonchi. CVS EXM: S1, S2, no murmur, rub or gallop. Abdomen: Soft, Non Tender, moderately distended, non tense ascites. Lower Extremities: No Edema. Access: lt arm avf, good thrill, dressings intact. Labs: CBC w/Diff Recent Labs  
  02/17/19 
0335 02/16/19 
0310 02/15/19 
0350 WBC 7.9 7.6 9.4  
RBC 2.65* 2.77* 2.75* HGB 7.0* 7.3* 7.2* HCT 21.3* 21.8* 21.5*  
PLT 91* 92* 85* GRANS 68 72 76* LYMPH 17* 16* 14* EOS 2 1 1 Chemistry Recent Labs  
  02/17/19 
0335 02/16/19 
0310 02/15/19 
0350 * 100* 104*  140 138  
K 3.8 3.8 3.7 CL 99* 100 100 CO2 25 19* 16*  
BUN 20* 14 22* CREA 3.90* 2.66* 4.31* CA 8.8 8.3* 8.0* AGAP 13 21* 22* BUCR 5* 5* 5* AP  --   --  467* TP  --   --  5.4* ALB 1.7* 1.7* 1.8*  1.8*  
GLOB  --   --  3.6 AGRAT  --   --  0.5* PHOS 3.7 2.6 3.1 Lab Results Component Value Date/Time Iron 67 02/09/2019 05:48 AM  
 TIBC 143 (L) 02/09/2019 05:48 AM  
 Iron % saturation 47 02/09/2019 05:48 AM  
  
Lab Results Component Value Date/Time  Calcium 8.8 02/17/2019 03:35 AM  
 CALCIUM,IONIZED 4.50 10/30/2018 11:15 AM  
 Phosphorus 3.7 02/17/2019 03:35 AM

## 2019-02-17 NOTE — PROGRESS NOTES
Speech Therapy Note: ST consult received. SLP will be in this afternoon to address. Wilman Elizondo M.S., CF-SLP Speech Language Pathologist

## 2019-02-18 LAB
ALBUMIN SERPL-MCNC: 1.7 G/DL (ref 3.4–5)
ANION GAP SERPL CALC-SCNC: 12 MMOL/L (ref 3–18)
BASOPHILS # BLD: 0.1 K/UL (ref 0–0.1)
BASOPHILS NFR BLD: 1 % (ref 0–2)
BUN SERPL-MCNC: 23 MG/DL (ref 7–18)
BUN/CREAT SERPL: 5 (ref 12–20)
CALCIUM SERPL-MCNC: 8.3 MG/DL (ref 8.5–10.1)
CHLORIDE SERPL-SCNC: 98 MMOL/L (ref 100–108)
CO2 SERPL-SCNC: 26 MMOL/L (ref 21–32)
CREAT SERPL-MCNC: 4.68 MG/DL (ref 0.6–1.3)
DIFFERENTIAL METHOD BLD: ABNORMAL
EOSINOPHIL # BLD: 0.2 K/UL (ref 0–0.4)
EOSINOPHIL NFR BLD: 3 % (ref 0–5)
ERYTHROCYTE [DISTWIDTH] IN BLOOD BY AUTOMATED COUNT: 25.9 % (ref 11.6–14.5)
GLUCOSE BLD STRIP.AUTO-MCNC: 133 MG/DL (ref 70–110)
GLUCOSE BLD STRIP.AUTO-MCNC: 138 MG/DL (ref 70–110)
GLUCOSE BLD STRIP.AUTO-MCNC: 155 MG/DL (ref 70–110)
GLUCOSE SERPL-MCNC: 114 MG/DL (ref 74–99)
HCT VFR BLD AUTO: 20.3 % (ref 35–45)
HGB BLD-MCNC: 6.6 G/DL (ref 12–16)
INR PPP: 1.4 (ref 0.8–1.2)
LYMPHOCYTES # BLD: 1 K/UL (ref 0.9–3.6)
LYMPHOCYTES NFR BLD: 14 % (ref 21–52)
MCH RBC QN AUTO: 26 PG (ref 24–34)
MCHC RBC AUTO-ENTMCNC: 32.5 G/DL (ref 31–37)
MCV RBC AUTO: 79.9 FL (ref 74–97)
MONOCYTES # BLD: 0.8 K/UL (ref 0.05–1.2)
MONOCYTES NFR BLD: 11 % (ref 3–10)
NEUTS SEG # BLD: 5.1 K/UL (ref 1.8–8)
NEUTS SEG NFR BLD: 71 % (ref 40–73)
PHOSPHATE SERPL-MCNC: 3.6 MG/DL (ref 2.5–4.9)
PLATELET # BLD AUTO: 55 K/UL (ref 135–420)
POTASSIUM SERPL-SCNC: 3.8 MMOL/L (ref 3.5–5.5)
PROTHROMBIN TIME: 16.5 SEC (ref 11.5–15.2)
RBC # BLD AUTO: 2.54 M/UL (ref 4.2–5.3)
SODIUM SERPL-SCNC: 136 MMOL/L (ref 136–145)
WBC # BLD AUTO: 7.2 K/UL (ref 4.6–13.2)

## 2019-02-18 PROCEDURE — 77030020186 HC BOOT HL PROTCT SAGE -B

## 2019-02-18 PROCEDURE — 92526 ORAL FUNCTION THERAPY: CPT

## 2019-02-18 PROCEDURE — 74011250637 HC RX REV CODE- 250/637: Performed by: INTERNAL MEDICINE

## 2019-02-18 PROCEDURE — 85610 PROTHROMBIN TIME: CPT

## 2019-02-18 PROCEDURE — 74011250636 HC RX REV CODE- 250/636: Performed by: PHYSICIAN ASSISTANT

## 2019-02-18 PROCEDURE — 74011250637 HC RX REV CODE- 250/637: Performed by: HOSPITALIST

## 2019-02-18 PROCEDURE — 82962 GLUCOSE BLOOD TEST: CPT

## 2019-02-18 PROCEDURE — 94640 AIRWAY INHALATION TREATMENT: CPT

## 2019-02-18 PROCEDURE — 94760 N-INVAS EAR/PLS OXIMETRY 1: CPT

## 2019-02-18 PROCEDURE — 74011000258 HC RX REV CODE- 258: Performed by: PHYSICIAN ASSISTANT

## 2019-02-18 PROCEDURE — 85025 COMPLETE CBC W/AUTO DIFF WBC: CPT

## 2019-02-18 PROCEDURE — 74011250636 HC RX REV CODE- 250/636: Performed by: INTERNAL MEDICINE

## 2019-02-18 PROCEDURE — 80069 RENAL FUNCTION PANEL: CPT

## 2019-02-18 PROCEDURE — 65660000000 HC RM CCU STEPDOWN

## 2019-02-18 PROCEDURE — 74011000250 HC RX REV CODE- 250: Performed by: HOSPITALIST

## 2019-02-18 PROCEDURE — 74011636637 HC RX REV CODE- 636/637: Performed by: FAMILY MEDICINE

## 2019-02-18 PROCEDURE — 77030021352 HC CBL LD SYS DISP COVD -B

## 2019-02-18 PROCEDURE — 77030037878 HC DRSG MEPILEX >48IN BORD MOLN -B

## 2019-02-18 PROCEDURE — 74011000250 HC RX REV CODE- 250: Performed by: INTERNAL MEDICINE

## 2019-02-18 PROCEDURE — 36591 DRAW BLOOD OFF VENOUS DEVICE: CPT

## 2019-02-18 PROCEDURE — 77010033678 HC OXYGEN DAILY

## 2019-02-18 PROCEDURE — 90935 HEMODIALYSIS ONE EVALUATION: CPT

## 2019-02-18 RX ORDER — PANTOPRAZOLE SODIUM 40 MG/1
40 TABLET, DELAYED RELEASE ORAL
Status: DISCONTINUED | OUTPATIENT
Start: 2019-02-18 | End: 2019-02-23 | Stop reason: HOSPADM

## 2019-02-18 RX ADMIN — IPRATROPIUM BROMIDE AND ALBUTEROL SULFATE 3 ML: .5; 3 SOLUTION RESPIRATORY (INHALATION) at 11:25

## 2019-02-18 RX ADMIN — BUDESONIDE 500 MCG: 0.5 INHALANT RESPIRATORY (INHALATION) at 08:42

## 2019-02-18 RX ADMIN — IPRATROPIUM BROMIDE AND ALBUTEROL SULFATE 3 ML: .5; 3 SOLUTION RESPIRATORY (INHALATION) at 20:05

## 2019-02-18 RX ADMIN — SODIUM CHLORIDE 1 G: 900 INJECTION INTRAVENOUS at 13:05

## 2019-02-18 RX ADMIN — ERYTHROPOIETIN 10000 UNITS: 10000 INJECTION, SOLUTION INTRAVENOUS; SUBCUTANEOUS at 22:24

## 2019-02-18 RX ADMIN — BUDESONIDE 500 MCG: 0.5 INHALANT RESPIRATORY (INHALATION) at 20:05

## 2019-02-18 RX ADMIN — ARFORMOTEROL TARTRATE 15 MCG: 15 SOLUTION RESPIRATORY (INHALATION) at 20:05

## 2019-02-18 RX ADMIN — RIFAXIMIN 550 MG: 550 TABLET ORAL at 09:23

## 2019-02-18 RX ADMIN — LACTULOSE 10 G: 20 SOLUTION ORAL at 09:23

## 2019-02-18 RX ADMIN — IPRATROPIUM BROMIDE AND ALBUTEROL SULFATE 3 ML: .5; 3 SOLUTION RESPIRATORY (INHALATION) at 00:18

## 2019-02-18 RX ADMIN — RIFAXIMIN 550 MG: 550 TABLET ORAL at 20:22

## 2019-02-18 RX ADMIN — PANTOPRAZOLE SODIUM 40 MG: 40 TABLET, DELAYED RELEASE ORAL at 09:23

## 2019-02-18 RX ADMIN — Medication 1 TABLET: at 09:23

## 2019-02-18 RX ADMIN — MIDODRINE HYDROCHLORIDE 7.5 MG: 2.5 TABLET ORAL at 13:32

## 2019-02-18 RX ADMIN — IPRATROPIUM BROMIDE AND ALBUTEROL SULFATE 3 ML: .5; 3 SOLUTION RESPIRATORY (INHALATION) at 08:42

## 2019-02-18 RX ADMIN — MIDODRINE HYDROCHLORIDE 7.5 MG: 2.5 TABLET ORAL at 06:00

## 2019-02-18 RX ADMIN — IPRATROPIUM BROMIDE AND ALBUTEROL SULFATE 3 ML: .5; 3 SOLUTION RESPIRATORY (INHALATION) at 03:10

## 2019-02-18 RX ADMIN — LACTULOSE 10 G: 20 SOLUTION ORAL at 22:14

## 2019-02-18 RX ADMIN — ARFORMOTEROL TARTRATE 15 MCG: 15 SOLUTION RESPIRATORY (INHALATION) at 08:42

## 2019-02-18 RX ADMIN — MIDODRINE HYDROCHLORIDE 7.5 MG: 2.5 TABLET ORAL at 22:13

## 2019-02-18 RX ADMIN — INSULIN LISPRO 2 UNITS: 100 INJECTION, SOLUTION INTRAVENOUS; SUBCUTANEOUS at 22:23

## 2019-02-18 NOTE — PROGRESS NOTES
Problem: Dysphagia (Adult) Goal: *Acute Goals and Plan of Care (Insert Text) Recommendations: 
Diet: Puree/thin Meds: Crushed in puree Aspiration Precautions Oral Care TID Other: Feeding assistance Goals:  Patient will: 1. Tolerate PO trials with 0 s/s overt distress in 4/5 trials 2. Utilize compensatory swallow strategies/maneuvers (decrease bite/sip, size/rate, alt. liq/sol) with min cues in 4/5 trials 3. Perform oral-motor/laryngeal exercises to increase oropharyngeal swallow function with min cues 4. Complete an objective swallow study (i.e., MBSS) to assess swallow integrity, r/o aspiration, and determine of safest LRD, min A Outcome: Progressing Towards Goal 
Speech language pathology dysphagia treatment Patient: Nemo Schaefer (16 y.o. female) Date: 2/18/2019 Diagnosis: UTI (urinary tract infection) [N39.0] Metabolic encephalopathy [O46.18] SBP (spontaneous bacterial peritonitis) ([facility] [address] Procedure(s) (LRB): ENDOSCOPIC ULTRASOUND (EUS) (N/A) 5 Days Post-Op Precautions: Aspiration, Fall, Skin ASSESSMENT: 
Pt seen for dysphagia f/u this afternoon with RN present; A&O to person only. Continues confused with inconsistent command following and poor safety awareness. Per SLP note on 12/17, pt's family to bring her dentures. Dentures not yet bedside. Observed during RN-fed puree/thin lunch; pt demo mildly delayed A-P transit and swallow timing/reflex, however, able to manipulate and clear with 0 clinical s/sx aspiration. Pt exhibited significant attention impairment throughout tx, hindering po intake level (~30% of lunch). Pt continues safe for puree, thin liquid diet with aspiration precautions, feeding assistance. SLP will continue follow as indicated. Progression toward goals: 
[]         Improving appropriately and progressing toward goals [x]         Improving slowly and progressing toward goals 
[]         Not making progress toward goals and plan of care will be adjusted PLAN: 
Recommendations and Planned Interventions: 
Puree / thin Patient continues to benefit from skilled intervention to address the above impairments. Continue treatment per established plan of care. Discharge Recommendations: To Be Determined SUBJECTIVE:  
Patient stated Where's Alysia?. OBJECTIVE:  
Cognitive and Communication Status: 
Neurologic State: Alert Orientation Level: Oriented to person Cognition: Decreased command following, Impulsive Perception: Appears intact Perseveration: Perseverates during conversation Safety/Judgement: Decreased awareness of environment, Decreased awareness of need for assistance, Decreased awareness of need for safety, Decreased insight into deficits Dysphagia Treatment: 
Oral Assessment: 
Oral Assessment Labial: Decreased rate Dentition: Edentulous Oral Hygiene: [address] Lingual: No impairment Velum: No impairment Mandible: No impairment P.O. Trials: 
 Patient Position: XPL20 Vocal quality prior to P.O.: No impairment Consistency Presented: Thin liquid, Puree How Presented: RN/SLP-fed/presented, Straw, Spoon Bolus Acceptance: Impaired Bolus Formation/Control: Impaired Type of Impairment: Incomplete, Mastication Propulsion: Delayed (# of seconds) Oral Residue: None Initiation of Swallow: Delayed (# of seconds) Laryngeal Elevation: Decreased Aspiration Signs/Symptoms: None Pharyngeal Phase Characteristics: Multiple swallows, Poor endurance Effective Modifications: Alternate liquids/solids, Small sips and bites Cues for Modifications: Maximal 
   
 Oral Phase Severity: Moderate-severe Pharyngeal Phase Severity : Mild PAIN: 
Start of Tx: 0 End of Tx: 0 The severity rating is based on the following outcomes: SHANE Noms Swallow Level 5 Clinical Judgement After treatment:  
[]              Patient left in no apparent distress sitting up in chair 
[x]              Patient left in no apparent distress in bed [x]              Call bell left within reach [x]              Nursing notified 
[]              Family present 
[]              Caregiver present 
[]              Bed alarm activated COMMUNICATION/EDUCATION:  
[x] Aspiration precautions; swallow safety; compensatory techniques []        Patient unable to participate in education; education ongoing with staff 
[]  Posted safety precautions in patient's room. [] Oral-motor/laryngeal strengthening exercises Carlee Fernando SLP Time Calculation: 15 mins

## 2019-02-18 NOTE — PROGRESS NOTES
Case management following. DCP will depend on progress with therapy. Available as needed. Yady Lanza RN,ext [phone].

## 2019-02-18 NOTE — ROUTINE PROCESS
0730: Bedside shift change report given to Zohaib Gutierrez RN (oncoming nurse) by JOSE Iniguez RN (offgoing nurse). Report included the following information SBAR, Kardex, ED Summary, Procedure Summary, Intake/Output, MAR, Accordion, Recent Results and Med Rec Status. 0800: Dr. Shady Gould notified of Hgb 6.6, no new orders at this time. 0930:TRANSFER - OUT REPORT: 
 
Verbal report given to Dorothy RN(name) on Lester Cavanaugh  being transferred to (unit) for routine progression of care Report consisted of patients Situation, Background, Assessment and  
Recommendations(SBAR). Information from the following report(s) SBAR, Kardex, ED Summary, Procedure Summary, Intake/Output, MAR, Accordion, Recent Results and Med Rec Status was reviewed with the receiving nurse. Lines:  
Triple Lumen 02/13/19 Right Internal jugular (Active) Central Line Being Utilized Yes 2/18/2019  7:00 AM  
Criteria for Appropriate Use Hemodynamically unstable, requiring monitoring lines, vasopressors, or volume resuscitation 2/18/2019  7:00 AM  
Site Assessment Clean, dry, & intact 2/18/2019  7:00 AM  
Infiltration Assessment 0 2/18/2019  4:00 AM  
Affected Extremity/Extremities Color distal to insertion site pink (or appropriate for race) 2/18/2019  7:00 AM  
Date of Last Dressing Change 02/14/19 2/18/2019  4:00 AM  
Dressing Status Clean, dry, & intact 2/18/2019  7:00 AM  
Dressing Type Transparent;Tape 2/18/2019  4:00 AM  
Action Taken Open ports on tubing capped 2/18/2019  7:00 AM  
Proximal Hub Color/Line Status White;Patent 2/18/2019  4:00 AM  
Positive Blood Return (Medial Site) Yes 2/18/2019  4:00 AM  
Medial Hub Color/Line Status Blue;Patent 2/18/2019  4:00 AM  
Positive Blood Return (Lateral Site) Yes 2/18/2019  4:00 AM  
Distal Hub Color/Line Status Brown;Patent 2/18/2019  4:00 AM  
Positive Blood Return (Site #3) Yes 2/18/2019  4:00 AM  
External Catheter Length (cm) 0 centimeters 2/17/2019  4:00 PM  
 Alcohol Cap Used Yes 2/18/2019  4:00 AM  
   
Peripheral IV Anterior;Proximal;Right Forearm (Active) Site Assessment Clean, dry, & intact 2/18/2019  7:00 AM  
Phlebitis Assessment 0 2/18/2019  6:00 AM  
Infiltration Assessment 0 2/18/2019  6:00 AM  
Dressing Status Clean, dry, & intact 2/18/2019  7:00 AM  
Dressing Type Transparent;Tape 2/18/2019  6:00 AM  
Hub Color/Line Status Pink;Patent 2/18/2019  6:00 AM  
Action Taken Open ports on tubing capped 2/18/2019  6:00 AM  
Alcohol Cap Used Yes 2/18/2019  6:00 AM  
   
Peripheral IV 02/13/19 Right Antecubital (Active) Site Assessment Clean, dry, & intact 2/18/2019  7:00 AM  
Phlebitis Assessment 0 2/18/2019  6:00 AM  
Infiltration Assessment 0 2/18/2019  6:00 AM  
Dressing Status Clean, dry, & intact 2/18/2019  7:00 AM  
Dressing Type Transparent;Tape 2/18/2019  6:00 AM  
Hub Color/Line Status Pink;Patent 2/18/2019  6:00 AM  
Action Taken Open ports on tubing capped 2/18/2019  6:00 AM  
Alcohol Cap Used Yes 2/18/2019  6:00 AM  
   
Peripheral IV 02/13/19 (Active) Site Assessment Clean, dry, & intact 2/18/2019  7:00 AM  
Phlebitis Assessment 0 2/18/2019  6:00 AM  
Infiltration Assessment 0 2/18/2019  6:00 AM  
Dressing Status Clean, dry, & intact 2/18/2019  7:00 AM  
Dressing Type Transparent;Tape 2/18/2019  7:00 AM  
Hub Color/Line Status Pink;Patent 2/18/2019  7:00 AM  
Action Taken Open ports on tubing capped 2/18/2019  7:00 AM  
Alcohol Cap Used Yes 2/18/2019  7:00 AM  
  
 
Opportunity for questions and clarification was provided. Patient transported with: 
 O2 @ 2 liters Registered Nurse Bedside handoff at 11:00 with RUPINDER De La Cruz

## 2019-02-18 NOTE — PROGRESS NOTES
conducted a Follow up consultation and Spiritual Assessment for Bartholomew Sacks, who is a 46 y.o.,female. The  provided the following Interventions: 
Continued the relationship of care and support. Listened empathically. Offered prayer and assurance of continued prayer on patients behalf. Chart reviewed. The following outcomes were achieved: 
Patient expressed gratitude for pastoral care visit. Assessment: 
There are no further spiritual or Scientologist issues which require Spiritual Care Services interventions at this time. Plan: 
Chaplains will continue to follow and will provide pastoral care on an as needed/requested basis.  recommends bedside caregivers page  on duty if patient shows signs of acute spiritual or emotional distress. [address] [facility] Staff  Spiritual Care  
[phone]

## 2019-02-18 NOTE — PROGRESS NOTES
Physical Exam  
Eyes: Scleral icterus is present. Periorbital edema present around both eyes Abdominal: She exhibits ascites. Skin:  
 
  
Bedside change of shift report was given to me, Neil Cook RN  ( ICU night shift nurse), Dania Caballero RN & Fanny Silva RN  ( ICU day shift nurse). Report included the following information:  SBAR, kardex, consultations, hemodynamic monitoring, intake/output, MAR, lab results, VS trends, cardiac rhythm noted NSR. Skin, neuro, respiratory status, order verification, and critical IV gtt rate verification has been dually noted and verified at the bedside with:  Dania Caballero RN & Fanny Silva RN                                    
NSICU Nurse's Note: 
2000:  Pt is awake, alert x 2-3, restless and forgetful with intermittent confusion. Pt is able to be verbally redirected and is appropriately able to verbalize and  convey her needs. Pt has notable generalized weakness and spontaneous, purposeful movement to all extremities. Pt does require max assist with all ADLs and repositioning. Bed is locked and in lowest position,side rails up x3, exit alarm activated, call bell is in reach. Interventions are ongoing,will continue to monitor. Neurological: as noted in assessment Cardiovascular: NSR on the monitor with occasional PVC. Pulmonary: breath sounds diminished, saturations maintained 100% on 2 LPM via NC 
GI/: Abdomen is distended at baseline with ascites, active bowel sounds noted x4 quads. Pt is presently tolerating a fluid restricted renal pureed diet without incident. Last BM noted 02/18/2018. Pt is anuric at baseline related to hx ESRD. IVF/Critical gtts: IV saline locked Skin: as noted above 
 
2200:  Accucheck noted 131, SSI administered at this time. Pt was repositioned. Will continue to monitor. Family members are present and attentive at the bedside. 0000: VSS, pt is afebrile.  She was bathed, repositioned and tolerating hair styling by daughters, who are present at the bedside. 0400: Labs drawn from CVL RIJ, flushed, patent. Pt was given another complete bed bath and tolerated bed linen change after an incontinence epised. Pt remains restless, anxiious, needy and often demanding. Bed is locked and in lowest position,side rails up x3, exit alarm activated, call bell is in reach. Interventions are ongoing,will continue to monitor. 0600: Pt tolerated scheduled am meds. She remains restless and moderately confused. Bed is locked and in lowest position,side rails up x3, exit alarm activated, call bell is in reach. Interventions are ongoing,will continue to monitor.   
0710: Bedside change of shift report given to: Judit Alfred RN

## 2019-02-18 NOTE — PROGRESS NOTES
Problem: Pressure Injury - Risk of 
Goal: *Prevention of pressure injury Document Honorio Scale and appropriate interventions in the flowsheet. Outcome: Progressing Towards Goal 
Pressure Injury Interventions: 
Sensory Interventions: Assess need for specialty bed Moisture Interventions: Absorbent underpads, Apply protective barrier, creams and emollients Activity Interventions: Assess need for specialty bed Mobility Interventions: Assess need for specialty bed Nutrition Interventions: Document food/fluid/supplement intake Friction and Shear Interventions: Apply protective barrier, creams and emollients, Foam dressings/transparent film/skin sealants Problem: Falls - Risk of 
Goal: *Absence of Falls Document Trevor Macleod Fall Risk and appropriate interventions in the flowsheet. Outcome: Progressing Towards Goal 
Fall Risk Interventions: 
Mobility Interventions: Bed/chair exit alarm Mentation Interventions: Bed/chair exit alarm Medication Interventions: Bed/chair exit alarm Elimination Interventions: Call light in reach, Toileting schedule/hourly rounds History of Falls Interventions: Bed/chair exit alarm Problem: Chronic Obstructive Pulmonary Disease (COPD) Goal: *Optimize nutritional status Outcome: Progressing Towards Goal 
Pt is presently tolerating a pureed diet w/ fluid restriction Problem: Ventilator Management Goal: *Normal spontaneous ventilation Outcome: Resolved/Met Date Met: 02/17/19 Pt is no longer intubated

## 2019-02-18 NOTE — CDMP QUERY
Per dietician on 2/15, this pt meets [place] Criteria for Severe Malnutrition, if you agree, please document in your progress notes to reflect this diagnosis Risk:  renal disease, cad, dm  
 
Clinical Indicators: 
 Severe Malnutrition, as evidenced by: 
             Severe muscle wasting, loss of subcutaneous fat Nutritional intake of <75% of recommended intake for >1 month Weight loss of  >5% in 1 month, >7.5% in 3 months, >10% in 6 months, >20% in 1 year Treatment:   Peg  feeding Please clarify if this patient is being treated/managed for: 
 
=>Severe Malnutrition =>Other Explanation of clinical findings =>Unable to Determine (no explanation of clinical findings) Please clarify and document your clinical opinion in the progress notes and discharge summary including the definitive and/or presumptive diagnosis, (suspected or probable), related to the above clinical findings. Please include clinical findings supporting your diagnosis. If you DECLINE this query or would like to communicate with [facility], please utilize the \"[facility] message box\" at the TOP of the Progress Note on the right. Thank you, 
Benitez Bush RN/CDS 
[phone]

## 2019-02-18 NOTE — PROGRESS NOTES
1856 -- Report received from [place], [address] [facility] to Emmanuel Perez, [facility], [facility]. 
 
2990 -- Patient on the unit, skin assessment by RUPINDER Lin and RUPINDER De La Cruz, A/O x2, cardiac monitor applied, no pain noted, IV flushed and capped, call bell and personal belongings in reach, will continue to monitor. [phone] -- Patient off the unit to Dialysis 1550 -- Spoke with BODØ (Dialysis), patient very agitated, need restraints to continue Dialysis, patient is trying to pull the lines out of arm and pulling on BODØ (dialysis tech), patient doesn't want to be touch or hooked up to machine, nurse when to dialysis to calm patient, patient still resisting. 36 -- Dr. Frazier Record paged, to get an order for restraints, for treatment to be successful. 
 
1 -- Spoke with Dr. Ermias Buenrostro on the unit, about getting restraints for patient while in dialysis, nurse explained that the patient was agitated, and not following commands, pulling at the line in her arm, restraint order placed. [phone] -- Nurse to dialysis, restraints placed, patient educated, no sign of distress, BODØ (dialysis) at the bedside, will continue to monitor. 18 -- Nurse back to dialysis to assess the patient, while in restraints, BODØ (dialysis) was finished treatment and holding pressure, no signs of distress or agitation, restraints removed, patient waiting for transport back to the unit. 1851 -- Patient back from Dialysis. Bedside shift change report given to RUPINDER Villareal (oncoming nurse) by RUPINDER de la cruz (offgoing nurse). Report included the following information SBAR, Kardex, Intake/Output, MAR and Recent Results.

## 2019-02-18 NOTE — PROGRESS NOTES
45 yo BF with ESRD and multiple other issues being followed for Cholestatic jaundice. No evidence of biliary obstruction on imaging Studies. T Bili down to 14.8 Plan: continue supportive care Transjugular liver biopsy when more stable

## 2019-02-18 NOTE — PROGRESS NOTES
OT order received, chart reviewed. Pt not appropriate for mobility at this time secondary to low H&H (6.6; 20.3). Natalie Holt MS OTR/L Office Ext: [phone] Pager: [phone]

## 2019-02-18 NOTE — DIALYSIS
ACUTE HEMODIALYSIS FLOW SHEET 
 
HEMODIALYSIS ORDERS: [id number] Dialyzer: revaclear         Duration:3.5  hr  BFR:  350  DFR: 125 Dialysate:  Temp 37 K+3       Ca+ 3  Na140  Bicarb 35 Weight:   kg    Bed Scale []     Unable to Obtain []      Dry weight/UF [id number]:  Access Needle Gauge Heparin []  Bolus      Units    [] Hourly       Units    []None Catheter locking solution Pre BP:       Pulse:          Temperature:     Respirations:   Tx: NS       ml/Bolus  Other        [] N/A Labs: Pre        Post:        [x] N/A Additional Orders(medications, blood products, hypotension management):       [] N/A [x] Time Out/Safety Check  [x] [facility] Consent Verified CATHETER ACCESS: [x]N/A   []Right   []Left   []IJ     []Fem [] First use X-ray verified     []Tunnel                [] Non Tunneled []No S/S infection  []Redness  []Drainage []Cultured []Swelling []Pain []Medical Aseptic Prep Utilized   []Dressing Changed  [] Biopatch  Date:      
[]Clotted   []Patent   Flows: []Good  []Poor  []Reversed If access problem,  notified: []Yes    [x]N/A  Date:        
 
GRAFT/FISTULA ACCESS:  []N/A     []Right     [x]Left     [x]UE     []LE [x]AVG   []AVF        []Buttonhole    [x]Medical Aseptic Prep Utilized [x]No S/S infection  []Redness  []Drainage []Cultured []Swelling []Pain Bruit:   [x] Strong    [] Weak       Thrill :   [x] Strong    [] Weak Needle Gauge: 15   Length: 1 If access problem,  notified: []Yes     [x]N/A  Date:       
Please describe access if present and not used:  
 
 
GENERAL ASSESSMENT:   
LUNGS:  Rate  SaO2%        [] N/A    [] Clear  [x] Coarse  [] Crackles  [] Wheezing 
      [x] Diminished     Location : []RLL   []LLL    []RUL  []ADINA Cough: []Productive  []Dry  [x]N/A   Respirations:  [x]Easy  []Labored Therapy:  []RA  [x]NC  l/min    Mask: []NRB []Venti       O2% []Ventilator  []Intubated  [] Trach  [] BiPaP CARDIAC: [x]Regular      [] Irregular   [] Pericardial Rub  [] JVD []  Monitored  [] Bedside  [] Remotely monitored [] N/A  Rhythm: EDEMA: [] None  [x]Generalized  [] Pitting [] 1    [] 2    [] 3    [] 4 [] Facial  [] Pedal  []  UE  [] LE  
SKIN:   [x] Warm  [] Hot     [] Cold   [x] Dry     [] Pale   [] Diaphoretic    
             [] Flushed  [] Jaundiced  [] Cyanotic  [] Rash  [] Weeping LOC:    [] Alert      []Oriented:    [] Person     [] Place  []Time 
             [x] Confused  [] Lethargic  [] Medicated  [] Non-responsive GI / ABDOMEN:  [] Flat    [] Distended    [x] Soft    [] Firm   []  Obese 
                           [] Diarrhea  [x] Bowel Sounds  [] Nausea  [] Vomiting  / URINE ASSESSMENT:[] Voiding   [x] Oliguria  [] Anuria   []  Reis [] Incontinent    []  Incontinent Brief      []  Bathroom Privileges PAIN: [x] 0 []1  []2   []3   []4   []5   []6   []7   []8   []9   []10 Scale 0-10  Action/Follow Up: MOBILITY:  [] Amb    [] Amb/Assist    [x] Bed    [] Wheelchair  [] Stretcher All Vitals and Treatment Details on Attached Flowsheet Hospital: [facility] Room # [id number] [] 1st Time Acute  [] Stat  [x] Routine  [] Urgent [x] Acute Room  []  Bedside  [] ICU/CCU  [] ER Isolation Precautions:  [x] Dialysis   [] Airborne   [] Contact    [] Reverse Special Considerations:         [] Blood Consent Verified [x]N/A ALLERGIES:   [x] NKA Code Status:  [x] Full Code  [] DNR  [] Other HBsAg ONLY: Date Drawn  2/8/19        [x]Negative []Positive []Unknown HBsAb: Date 2/8/19    [] Susceptible   [x] [id number] []Not Drawn  [] Drawn Current Labs:    Date of Labs: Today [x] Results for Teresa Cooper (MRN [id number]) as of 2/18/2019 15:09 Ref. Range 2/18/2019 04:10 WBC Latest Ref Range: 4.6 - 13.2 K/uL 7.2 RBC Latest Ref Range: 4.20 - 5.30 M/uL 2.54 (L) HGB Latest Ref Range: 12.0 - 16.0 g/dL 6.6 (L) HCT Latest Ref Range: 35.0 - 45.0 % 20.3 (L) MCV Latest Ref Range: 74.0 - 97.0 FL 79.9 MCH Latest Ref Range: 24.0 - 34.0 PG 26.0 MCHC Latest Ref Range: 31.0 - 37.0 g/dL 32.5 RDW Latest Ref Range: 11.6 - 14.5 % 25.9 (H) PLATELET Latest Ref Range: 135 - 420 K/uL 55 (L) NEUTROPHILS Latest Ref Range: 40 - 73 % 71 LYMPHOCYTES Latest Ref Range: 21 - 52 % 14 (L) MONOCYTES Latest Ref Range: 3 - 10 % 11 (H) EOSINOPHILS Latest Ref Range: 0 - 5 % 3  
BASOPHILS Latest Ref Range: 0 - 2 % 1 DF Latest Units:   AUTOMATED  
ABS. NEUTROPHILS Latest Ref Range: 1.8 - 8.0 K/UL 5.1 ABS. LYMPHOCYTES Latest Ref Range: 0.9 - 3.6 K/UL 1.0  
ABS. MONOCYTES Latest Ref Range: 0.05 - 1.2 K/UL 0.8 ABS. EOSINOPHILS Latest Ref Range: 0.0 - 0.4 K/UL 0.2  
ABS. BASOPHILS Latest Ref Range: 0.0 - 0.1 K/UL 0.1 INR Latest Ref Range: 0.8 - 1.2   1.4 (H) Prothrombin time Latest Ref Range: 11.5 - 15.2 sec 16.5 (H) Sodium Latest Ref Range: 136 - 145 mmol/L 136 Potassium Latest Ref Range: 3.5 - 5.5 mmol/L 3.8 Chloride Latest Ref Range: 100 - 108 mmol/L 98 (L)  
CO2 Latest Ref Range: 21 - 32 mmol/L 26 Anion gap Latest Ref Range: 3.0 - 18 mmol/L 12 Glucose Latest Ref Range: 74 - 99 mg/dL 114 (H) BUN Latest Ref Range: 7.0 - 18 MG/DL 23 (H) Creatinine Latest Ref Range: 0.6 - 1.3 MG/DL 4.68 (H) BUN/Creatinine ratio Latest Ref Range: 12 - 20   5 (L) Calcium Latest Ref Range: 8.5 - 10.1 MG/DL 8.3 (L) Phosphorus Latest Ref Range: 2.5 - 4.9 MG/DL 3.6 GFR est non-AA Latest Ref Range: >60 ml/min/1.73m2 10 (L) GFR est AA Latest Ref Range: >60 ml/min/1.73m2 12 (L) Albumin Latest Ref Range: 3.4 - 5.0 g/dL 1.7 (L) Results for Yocasta Baldwin (MRN [id number]) as of 2/18/2019 15:09 Ref. Range 2/8/2019 06:05 Hepatitis B surface Ag Latest Ref Range: <1.00 Index <0.10 Hep B surface Ag Interp.  Latest Ref Range: NEG   NEGATIVE  
 Hepatitis B surface Ab Latest Ref Range: >10.0 mIU/mL >1,000.00 Hep B surface Ab Interp. Latest Ref Range: POS   POSITIVE Hep B surface Ab comment Latest Units:   Samples with a  v... Cut and paste current labs here. DIET:  [x] Renal    [] Other     [] NPO     [x]  Diabetic PRIMARY NURSE REPORT: First initial/Last name/Title Pre Dialysis:  Dorothy RN   Time:  1500 EDUCATION:   
[x] Patient [] Other         Knowledge Basis: []None [x]Minimal [] Substantial  
Barriers to learning  []N/A [x] Access Care     [] S&S of infection     [] Fluid Management     []K+     []Procedural   
[]Albumin     [] Medications     [] Tx Options     [] Transplant     [] Diet     [] Other Teaching Tools:  [x] Explain  [] Demo  [] Handouts [] Video Patient response:   [x] Verbalized understanding  [] Teach back  [] Return demonstration [] Requires follow up Inappropriate due to         
 
[address] [place] [address] Road Before each treatment:    
Machine Number:                   [facility] 
                                [] Unit Machine #  with centralized RO 
                                [] Portable Machine #1/RO serial # [id number] [] Portable Machine #2/RO serial # [id number] [] Portable Machine #3/RO serial # [id number] [facility] 
                                [] Portable Machine #11/RO serial # [id number] [x] Portable Machine #12/RO serial # [id number] [] Portable Machine #13/RO serial #  [id number] Alarm Test:  Pass time          Other:        
[x] RO/Machine Log Complete Temp    37            [x]Extracorporeal Circuit Tested for integrity Dialysate: pH7   Conductivity: Meter 14       HD Machine  14                   TCD: 13.9 Dialyzer Lot #  [id number]           Blood Tubing Lot #[id number]      Saline Lot # [id number]   
 
CHLORINE TESTING-Before each treatment and every 4 hours Total Chlorine: [x] less than 0.1 ppm  Time:1500  4 Hr/2nd Check Time:   
(if greater than 0.1 ppm from Primary then every 30 minutes from Secondary) TREATMENT INITIATION  with Dialysis Precautions:  
[x] All Connections Secured                 [x] Saline Line Double Clamped  
[x] Venous Parameters Set                  [x] Arterial Parameters Set [x] Prime Given   ml   250            [x]Air Foam Detector Engaged Treatment Initiation Note: Started treatment after much convincing. Pt is confused and didn't want needles at first. Then she began to pull on them and bend them and pinch them requiring a constant struggle to run dialysis and keep lines secure. Notified Dr. Marcus Drummond . Eventually a soft restraint had to be used to help keep treatment going safely. Dorothy RN brought them over and applied them. I monitored pt very closely when Treatment was over and lines removed without bleeding the restraints were removed. Medication Dose Volume Route Initials Dialyzer Cleared: [] Good [x] Fair  [] Poor Blood processed:   L 
UF Removed 0  Ml Post Wt:     kg 
POst BP: 103/70     Pulse:   97    Respirations: 18  Temperature: 97.8 Post Tx Vascular Access: AVF/AVG: Bleeding stopped Art 5 min. Osmany.5  Min   N/A Catheter: Locking solution: Heparin 1:1000 Art. Osmany. N/A Post Assessment:  
  
                              Skin:  [x] Warm  [x] Dry [] Diaphoretic    [] Flushed  [] Pale [] Cyanotic [facility] Signatures Title Initials  Time Lungs: [] Clear    [x] Course  [] Crackles  [] Wheezing [] Diminished Cardiac: [x] Regular   [] Irregular   [] Monitor  [] N/A  Rhythm:      
    Edema:  [] None    [x] General     [] Facial   [] Pedal    [] UE    [] LE  
    Pain: [x]0  []1  []2   []3  []4   []5   []6   []7   []8   []9   []10 Post Treatment Note: Pt did not tolerate fluid removal but did tolerate treatment. POST TREATMENT PRIMARY NURSE HANDOFF REPORT:  
 
First initial/Last name/Title Post Dialysis:Dorothy RN  Time: 3800 Abbreviations: AVG-arterial venous graft, AVF-arterial venous fistula, IJ-Internal Jugular, Subcl-Subclavian, Fem-Femoral, Tx-treatment, AP/HR-apical heart rate, DFR-dialysate flow rate, BFR-blood flow rate, AP-arterial pressure, -venous pressure, UF-ultrafiltrate, TMP-transmembrane pressure, Osmany-Venous, Art-Arterial, RO-Reverse Osmosis

## 2019-02-18 NOTE — PROGRESS NOTES
Internal Medicine Progress Note Patient's Name: Carlos Bob Admit Date: 2/8/2019 Length of Stay: 10 
 
 
Assessment/Plan Principal Problem: 
  SBP (spontaneous bacterial peritonitis) ([address] (2/12/2019) Active Problems: Metabolic encephalopathy (2/9/3958) UTI (urinary tract infection) (2/8/2019) Cirrhosis ([address] (2/8/2019) ESRD (end stage renal disease) on dialysis ([address] (2/8/2019) COPD (chronic obstructive pulmonary disease) ([address] (2/8/2019) Hyperbilirubinemia (2/8/2019) Combined systolic and diastolic heart failure ([address] (2/10/2019) Cholestatic jaundice (2/12/2019) Hypotension (2/13/2019) Anemia, chronic disease (2/15/2019) Coagulopathy ([address] (2/15/2019) Acute respiratory failure ([address] (2/15/2019) Shock ([address] (2/15/2019) Thrombocytopenia ([address] (2/16/2019) Ileus ([address] (2/17/2019) SBP 
-cont rocephin-10day course completed, continue day to day 
- paracentesis done 2/11, cell count profile consistent with bacterial peritonitis 
  
ESRD on dialysis 
- nephro consult - appreciate assistance 
- HD on 2/9, 2/13, 2/18 
- hold dialysis on 2/12 d/t HOTN - midodrine added 
- dialysis done 2/13, albumin given, no fluid pulled off 
  
Cholestatic jaundice 
- CT abd results below - GI consult - appreciate assistance - serologies, paracentesis to eval fluid, start lactulose/rifaximin, vit K x3 days - MRCP w/o contrast, transjugular liver bx ordered - MRCP unable to be done - ERCP done 2/13 - results below, GI recs medical management of decompensated chronic liver disease with cholestasis -Transjugular liver biopsy when mores table UTI 
- iv rocephin x5 days - UCx - negative 
  
Metabolic encephalopathy 
- Head CT - no acute abnormality 
- monitor neuro status - worsened on 2/13 likely secondary to anesthesia - w/d to pain 
  
HF 
- cardio consult - appreciate assistance 
  
COPD 
- brovana, pulmicort 
-Duoneb Acute Resp failure 
-self extubated 
 -stable NC Shock 
-Levophed D/C  
-BP stablizing 
 
 
 
- Cont acceptable home medications for chronic conditions  
- DVT protocol I have personally reviewed all pertinent labs and films that have officially resulted over the last 24 hours. I have personally checked for all pending labs that are awaiting final results. Interval History Major Ahmet a 46 y.o. female with a PMHx ESRD on dialysis, DM, CAD, COPD, syst/diastolic heart failure, HTN, chronic liver disease who presented to the ED from dialysis due to AMS. History limited 2/2 AMS. ED eval revealed scleral icterus, confusion, leukocytosis, UTI. Hyperbilirubinemia (21.7) and elevated alk phos (571) are similar to recent ED visit at [facility] 2/1. Review of prior records from [facility] revealed she was supposed to have a percutaneous gallbladder drain placed on 2/4/19. Head CT neg. Pt will be admitted for further evaluation. IV rocephin started. Nephrology consulted - HD on 2/9 as patient missed the prior day. CT abd without contrast ordered, results below. GI consulted - serologies, paracentesis to eval fluid, start lactulose/rifaximin, vit K x3 days, cardio consult. UCx negative. PT recommending SNF. Paracentesis performed 2/11 - removed 100cc dark jed fluid, cell count profile consistent with SBP. Pt already on rocephin. Fluid cx ngtd. Midodrine added for HOTN. MRCP w/o contrast and transjugular liver bx ordered per GI. MRCP unable to be done 2/2 patient not cooperating. ERCP done on 2/13 instead, no CBD stone or stricture seen. Patient transferred to ICU afterwards 2/2 HOTN and need for pressors instead of IVF due to ESRD. ICU team concerned about AMS and pt's ability to protect airway so patient was intubated. Pt's daughter informed of recent events, wishes for patient to remain full code.   Dialysis 2/15.  SBT, patient self extubated.   Remains on pressors but requiring less. Passed swallow eval. Pressors D/C, BP stabilizing. Subjective Pt off floor at dialysis Objective Visit Vitals BP 97/61 Pulse 97 Temp 97.8 °F (36.6 °C) Resp 18 Ht 5' 5\" (1.651 m) Wt 54 kg (119 lb 0.8 oz) SpO2 99% BMI 19.81 kg/m² Physical Exam: 
General Appearance: NAD, conversant HENT: normocephalic/atraumatic, moist mucus membranes Neck: No JVD, supple Lungs: CTA with normal respiratory effort CV: RRR, no m/r/g Abdomen: soft, non-tender, normal bowel sounds Extremities: no cyanosis, no peripheral edema Neuro: No focal deficits, motor/sensory intact Skin: Normal color, intact Lymphatics: No cervical or supraclavicular lymphadenopathy Psych: appropriate affect, alert and oriented to person, place and time Intake and Output: 
Current Shift:  02/18 0701 - 02/18 1900 In: 100 [P.O.:100] Out: - Last three shifts:  02/16 1901 - 02/18 0700 In: 1120.8 [P.O.:845; I.V.:275.8] Out: 1 Lab/Data Reviewed: All lab results for the last 24 hours reviewed. Imaging Reviewed: 
No results found. Medications Reviewed: 
Current Facility-Administered Medications Medication Dose Route Frequency  pantoprazole (PROTONIX) tablet 40 mg  40 mg Oral ACB  [START ON 2/19/2019] cefTRIAXone (ROCEPHIN) 1 g in 0.9% sodium chloride (MBP/ADV) 50 mL MBP  1 g IntraVENous Q24H  
 midodrine (PROAMITINE) tablet 7.5 mg  7.5 mg Oral Q8H  
 oxyCODONE IR (ROXICODONE) tablet 5 mg  5 mg Oral Q6H PRN  
 insulin lispro (HUMALOG) injection   SubCUTAneous AC&HS  
 glucose chewable tablet 16 g  4 Tab Oral PRN  
 glucagon (GLUCAGEN) injection 1 mg  1 mg IntraMUSCular PRN  
 dextrose (D50) infusion 12.5-25 g  25-50 mL IntraVENous PRN  
 albuterol-ipratropium (DUO-NEB) 2.5 MG-0.5 MG/3 ML  3 mL Nebulization Q4H RT  
 epoetin briseida (EPOGEN;PROCRIT) injection 10,000 Units  10,000 Units IntraVENous DIALYSIS MON, WED & FRI  
  rifAXIMin (XIFAXAN) tablet 550 mg  550 mg Oral BID  lactulose (CHRONULAC) solution 10 g  15 mL Oral TID  B complex-vitamin C-folic acid (NEPHRO-NACHO) 0.8 mg tab  1 Tab Oral DAILY  arformoterol (BROVANA) neb solution 15 mcg  15 mcg Nebulization BID RT And  
 budesonide (PULMICORT) 500 mcg/2 ml nebulizer suspension  500 mcg Nebulization BID RT Unknown HUBER Dolan [address] [place], [place] [address] Pager: [phone] Office: [phone]

## 2019-02-18 NOTE — DIABETES MGMT
Nutrition RE-assessment/Plan of care RECOMMENDATIONS:  
1. Try Nepro, one bottle/day for now (noted 1200 CC FR). If pt takes supplement well, can consider increasing Nepro to 2 bottles/day. 2.  Monitor po intake, labs and weights. GOALS:  
1. PO intake will meet >75% of protein/calorie needs by 2/23/19. 
2. Weight Maintenance (+/- 1-2 lb) by  2/24/19. ASSESSMENT: 
Wt status is classified as normal per BMI of 20.2. However per documented weight records Pt w/ weight loss equating to 18 lb or 13% x ~3 months. History of poor PO intake. Labs noted. Pt w/ hypoalbuminemia as evidenced by albumin of 1.7, elevate Bili (14.8), elevated Alk Phos (467) and elevated BUN/Cr; GFR (10). Poor po intake noted at breakfast this morning - needs feeding assistance. May benefit from receiving po supplements. Nutrition Diagnoses:  
Unintentional weight loss related to inadequate energy intake PTA as evidenced by an 18 lb or 13% x ~3 months per documented weight records. Inadequate energy intake PTA related to decreased appetite as evidenced by diet recall over the past couple of months. Inadequate oral food and beverage intake due to poor appetite as evidenced by po intake < 25% per I/O's and meal time observation. Meets Criteria for Chronic Malnutrition  
[x] Severe Malnutrition, as evidenced by: 
 [x] Severe muscle wasting, loss of subcutaneous fat 
 [x] Nutritional intake of <75% of recommended intake for >1 month 
 [x] Weight loss of  >5% in 1 month, >7.5% in 3 months, >10% in 6 months, >20% in 1 year 
 [] Severe edema Nutrition Risk:  [x] High  [] Moderate []  Low SUBJECTIVE/OBJECTIVE: 
Pt admitted for UTI/metabolic encephalopathy. Pt w/ ESRD on HD. Pt w/ jaundice eyes and elevated hepatic panel. Noted per documented weight records Pt w/ weight loss equating to 18 lb or 13% x ~3 months. Pt appears thin with noted muscle wasting and SQ fat loss of extremities.  Per diet/weight history obtained by RD on 2/10/19 (prior to intubation) pt reported poor appetite for the past 2 months and minimal PO intake. 2/14/19:  Now intubated. 2/15/19:  TF on hold - OGT to suction. 2/18/19:  PtsSelf extubated 2/16/19; currently receiving po diet;  needs feeding assistance per RN. Information Obtained from:   
 [x] Chart Review 
 [] Patient 
 [] Family/Caregiver [x] Nurse/Physician 
 [x] Interdisciplinary Meeting/Rounds Diet:  Pureed,  Renal, FR = 1200 ml/d Medications: [x] Reviewed Lactulose,  Nephro-Mejia, Allergies: [x] Reviewed Encounter Diagnoses ICD-10-CM ICD-9-CM 1. Urinary tract infection without hematuria, site unspecified N39.0 599.0 2. Hyperbilirubinemia E80.6 782.4 3. Confusion R41.0 298.9 4. [facility] Z99.2 V45.11 Labs:   
Lab Results Component Value Date/Time Sodium 136 02/18/2019 04:10 AM  
 Potassium 3.8 02/18/2019 04:10 AM  
 Chloride 98 (L) 02/18/2019 04:10 AM  
 CO2 26 02/18/2019 04:10 AM  
 Anion gap 12 02/18/2019 04:10 AM  
 Glucose 114 (H) 02/18/2019 04:10 AM  
 BUN 23 (H) 02/18/2019 04:10 AM  
 Creatinine 4.68 (H) 02/18/2019 04:10 AM  
 Calcium 8.3 (L) 02/18/2019 04:10 AM  
 Phosphorus 3.6 02/18/2019 04:10 AM  
 Albumin 1.7 (L) 02/18/2019 04:10 AM  
 
Lab Results Component Value Date/Time ALT (SGPT) 25 02/15/2019 03:50 AM  
 AST (SGOT) 29 02/15/2019 03:50 AM  
 Alk. phosphatase 467 (H) 02/15/2019 03:50 AM  
 Bilirubin, direct 11.2 (H) 02/15/2019 03:50 AM  
 Bilirubin, total 14.8 (H) 02/15/2019 03:50 AM  
 
Anthropometrics: BMI (calculated): 19.8 Last 3 Recorded Weights in this Encounter 02/16/19 0600 02/17/19 0700 02/18/19 0600 Weight: 52.7 kg (116 lb 2.9 oz) 53.8 kg (118 lb 9.7 oz) 54 kg (119 lb 0.8 oz) Ht Readings from Last 1 Encounters:  
02/11/19 5' 5\" (1.651 m) Weight Metrics 2/18/2019 10/30/2018 Weight 119 lb 0.8 oz 139 lb BMI 19.81 kg/m2 23.13 kg/m2 Patient Vitals for the past 100 hrs: 
 % Diet Eaten 02/17/19 1600 25 % IBW: 125 lb %IBW: 97% UBW: 130s lb [x] Weight Loss [] Weight Gain [] Weight Stable Estimated Nutrition Needs: [x] MSJ  [] Other: 
Calories: 1278-6823 kcal Based on:   [x] Actual BW   
Protein:   66-77 g Based on:   [x] Actual BW Fluid:       UO + 1000 ml Based on:   [x] Actual BW  
 
 [x] No Cultural, Yarsani or ethnic dietary need identified. [] Cultural, Yarsani and ethnic food preferences identified and addressed Wt Status:  [x] Normal (18.6 - 24.9) [] Underweight (<18.5) [] Overweight (25 - 29.9) [] Mild Obesity (30 - 34.9)  [] Moderate Obesity (35 - 39.9) [] Morbid Obesity (40+) Nutrition Problems Identified:  
[x] Suboptimal PO intake  
[] Food Allergies [x] Difficulty chewing/swallowing/poor dentition  
[] Constipation/Diarrhea  
[] Nausea/Vomiting  
[x] None - needs feeding assistance 
[] Other: intubated/NPO Plan:  
[x] Therapeutic Diet 
[]  Obtained/adjusted food preferences/tolerances and/or snacks options [x]  Supplements added - Nepro 1 bottle daily [] Occupational therapy following for feeding techniques []  HS snack added  
[]  Modify diet texture  
[]  Modify diet for food allergies []  Educate patient/family once all testing finished 
[]  Assist with menu selection  
[x]  Monitor PO intake on meal rounds  
[x]  Continue inpatient monitoring and intervention  
[]  Participated in discharge planning/Interdisciplinary rounds/Team meetings  
[]  Other:  
 
Education Needs: 
 [x] Not appropriate for teaching at this time due to: intubation 
 [] Identified and addressed Nutrition Monitoring and Evaluation: 
[x] Continue ongoing monitoring and intervention 
[] Other Gerldine Sandhoff, RD, CDE Office:  [phone] [place] Pager:  [phone]

## 2019-02-18 NOTE — PROGRESS NOTES
[facility] Progress Note Name: Jamie Underwood : 1970 MRN: [id number] Date: 2019 1:14 PM 
  
[x]I have reviewed the flowsheet and previous days notes. Events overnight reviewed and discussed with nursing staff. Vital signs and records reviewed. HPI 
47 y/o female w/ PMHX of ESRD on HD, COPD, CHF and chronic liver disease initially admitted for altered mental status 19. Per chart review, pt was unresponsive yesterday, went for MRCP, became hypotensive, unable to tolerate HD and was sent to ICU for management of pressor. Pt was emergently intubated and CVL placed in ICU. S/p self-extubation 19 Subjective: 
19 Awake, alert, NAD 
HD today Off pressor HD stable 
 
 
[x]The patient is critically ill on     
[]Mechanical ventilation [x]Pressors []BiPAP [] ROS:A comprehensive review of systems was negative except for that written in the HPI. Medication Review: · Pressors - Levo · Sedation - none · Antibiotics - Rocephin complete · Pain - not an issue · GI/ DVT - SCDs · Others (other gtts) Safety Bundles: Severe Sepsis Protocol/ Electrolyte Replacement Protocol Vital Signs:   
Visit Vitals /61 (BP 1 Location: Right arm) Pulse 97 Temp 97.2 °F (36.2 °C) Resp 24 Ht 5' 5\" (1.651 m) Wt 54 kg (119 lb 0.8 oz) SpO2 100% BMI 19.81 kg/m² O2 Device: Nasal cannula O2 Flow Rate (L/min): 2 l/min Temp (24hrs), Av.3 °F (36.3 °C), Min:97.1 °F (36.2 °C), Max:97.5 °F (36.4 °C) Intake/Output:  
Last shift:      No intake/output data recorded. Last 3 shifts:  1901 -  0700 In: 1120.8 [P.O.:845; I.V.:275.8] Out: 1 Intake/Output Summary (Last 24 hours) at 2019 8504 Last data filed at 2019 0400 Gross per 24 hour Intake 828.19 ml Output 1 ml Net 827.19 ml  
 
 
Ventilator Settings: 
Ventilator Mode: Assist control, VC+ Respiratory Rate Resp Rate Observed: 30 
 Back-Up Rate: 14 Insp Time (sec): 1 sec I:E Ratio: 1:1.9 Ventilator Volumes Vt Set (ml): 400 ml Vt Exhaled (Machine Breath) (ml): 451 ml Ve Observed (l/min): 8.74 l/min Ventilator Pressures PIP Observed (cm H2O): 20 cm H2O Plateau Pressure (cm H2O): 19 cm H2O 
MAP (cm H2O): 10 PEEP/VENT (cm H2O): 5 cm H20 Auto PEEP Observed (cm H2O): 0 cm H2O Physical Exam: 
 
General: Awake, alert HEENT:  Pink palpebral conjunctivae; mucosa moist 
Resp:  Coarse bilaterally to auscultation; Diminished in bases; Symmetrical chest expansion, no accessory muscle use; good airway entry; no rales/ wheezing/ rhonchi noted CV:  S1, S2 present; regular rate and rhythm; NSR 
GI:  Abdomen soft, distended; non-tender; (+) active bowel sounds Extremities:  +2 pulses on all extremities; no edema/ cyanosis/ clubbing noted; normal capillary refill Skin:  Warm; no rashes/ lesions noted Neurologic:  Non-focal; alert to self and place, follows commands, moves all extremities Devices: CVL, HD fistula DATA:  
 
Current Facility-Administered Medications Medication Dose Route Frequency  midodrine (PROAMITINE) tablet 7.5 mg  7.5 mg Oral Q8H  
 oxyCODONE IR (ROXICODONE) tablet 5 mg  5 mg Oral Q6H PRN  
 insulin lispro (HUMALOG) injection   SubCUTAneous AC&HS  pantoprazole (PROTONIX) 40 mg in sodium chloride 0.9% 10 mL injection  40 mg IntraVENous DAILY  glucose chewable tablet 16 g  4 Tab Oral PRN  
 glucagon (GLUCAGEN) injection 1 mg  1 mg IntraMUSCular PRN  
 dextrose (D50) infusion 12.5-25 g  25-50 mL IntraVENous PRN  
 albuterol-ipratropium (DUO-NEB) 2.5 MG-0.5 MG/3 ML  3 mL Nebulization Q4H RT  
 NOREPINephrine (LEVOPHED) 8 mg in 0.9% NS 250ml infusion  2-16 mcg/min IntraVENous TITRATE  epoetin briseida (EPOGEN;PROCRIT) injection 10,000 Units  10,000 Units IntraVENous DIALYSIS MON, WED & FRI  rifAXIMin (XIFAXAN) tablet 550 mg  550 mg Oral BID  lactulose (CHRONULAC) solution 10 g  15 mL Oral TID  B complex-vitamin C-folic acid (NEPHRO-NACHO) 0.8 mg tab  1 Tab Oral DAILY  arformoterol (BROVANA) neb solution 15 mcg  15 mcg Nebulization BID RT And  
 budesonide (PULMICORT) 500 mcg/2 ml nebulizer suspension  500 mcg Nebulization BID RT Labs: Results:  
   
Chemistry Recent Labs  
  02/18/19 
0410 02/17/19 
0335 02/16/19 
0310 * 109* 100*  137 140  
K 3.8 3.8 3.8 CL 98* 99* 100 CO2 26 25 19* BUN 23* 20* 14  
CREA 4.68* 3.90* 2.66* CA 8.3* 8.8 8.3* AGAP 12 13 21* BUCR 5* 5* 5* ALB 1.7* 1.7* 1.7* CBC w/Diff Recent Labs  
  02/18/19 
0410 02/17/19 
0335 02/16/19 
0310 WBC 7.2 7.9 7.6  
RBC 2.54* 2.65* 2.77* HGB 6.6* 7.0* 7.3* HCT 20.3* 21.3* 21.8*  
PLT 55* 91* 92* GRANS 71 68 72 LYMPH 14* 17* 16* EOS 3 2 1 Coagulation Recent Labs  
  02/18/19 0410 02/17/19 
0335 PTP 16.5* 16.6* INR 1.4* 1.4* Liver Enzymes Recent Labs  
  02/18/19 0410 ALB 1.7* ABG No results found for: PH, PHI, PCO2, PCO2I, PO2, PO2I, HCO3, HCO3I, FIO2, FIO2I Microbiology No results for input(s): CULT in the last 72 hours. Telemetry: [x]Sinus []A-flutter []Paced []A-fib []Multiple PVCs Imaging: CXR Results  (Last 48 hours) None CT Results  (Last 48 hours) None IMPRESSION:  
· Acute respiratory failure s/p self extubation 2/16/19 · Shock- resolved · End stage liver disease · Spontaneous Bacterial Peritonitis- on Ceftriaxone · ESRD · Encephalopathy- on Rifaxamin · CHF · UTI · Thrombocytopenia · COPD Hx 
· CAD · COPD · DM 
· ESRD 
· HLD 
· HTN 
· S/p heart valve repair PLAN:  
· Resp -  Stable on 3L NC. O2 goal > 90. HOB > 30. Aspiration precautions. Duo-nebs, Brovana, Pulmicort. · ID - Sepsis bundle. Afebrile and aleukocytosis. ABx complete for SBP. · CVS - HD stable. Midodrine. Continue to monitor HD status. · Heme/onc - Thrombocytopenia. Daily CBC. Monitor H/H and platelets. · Metabolic - Daily BMP. Monitor electrolytes. · Renal - Trend renal indices. Anuric. HD today. Procrit, Nephro-briseida. Nephrology following. · Endocrine - SSI, BS Q6 HR. · Neuro/ Pain/ Sedation - Monitor neuro status. · GI - Renal diet, pureed; fluid restricted. Rifaxmin, Lactulose. GI following. · Prophylaxis - DVT, GI- protonix, SCDs · Discussed in interdisciplinary rounds- HD stable. OK to transfer to floor. · Code status- FULL [x]See my orders for details My assessment/plan was discussed with: 
[x]nursing []PT/OT [x]respiratory therapy [x]Dr. Karen Fernandez  
[]family [] [x]Total critical care time exclusive of procedures  20   minutes Good Davis NP

## 2019-02-18 NOTE — PROGRESS NOTES
Renal Progress Note Follow up of ESRD Assessment/Plan: 1. ESRD. Dialysis today and continue mwf. Will try to gradually address volume overload. 2. Hypotension due to sbp/liver failure/sepsis. Continue midodrin. 3. Resp failure. Stable after self extubation. 5. Anemia of ckd. Continue greg with dialysis. 6. Secondary hyperparathyroidism of ckd. Continue to hold sensipar due to hypocalcemia. 7 Liver cirrhosis/ascites/sbp. GI on the case. EGD results noted- duodenal ulcers, no cbd stone. 8. Systolic chf.  
9. UTI. Finished abx. 10. Altered ms. Subjective: Out of the icu, nad but confused. No cp/sob/n/v. Patient Active Problem List  
Diagnosis Code  Metabolic encephalopathy T07.95  
 UTI (urinary tract infection) N39.0  Cirrhosis ([address] K74.60  ESRD (end stage renal disease) on dialysis (HCC) N18.6, Z99.2  COPD (chronic obstructive pulmonary disease) ([facility]) J44.9  Hyperbilirubinemia E80.6  Combined systolic and diastolic heart failure (HCC) I50.40  Cholestatic jaundice R17  
 SBP (spontaneous bacterial peritonitis) ([address] K65.2  Hypotension I95.9  Anemia, chronic disease D63.8  Coagulopathy ([address] D68.9  Acute respiratory failure (HCC) J96.00  Shock ([address] R57.9  Thrombocytopenia ([address] D69.6  Ileus ([address] K56.7 Current Facility-Administered Medications Medication Dose Route Frequency Provider Last Rate Last Dose  pantoprazole (PROTONIX) tablet 40 mg  40 mg Oral ACB Charla Saha MD   40 mg at 02/18/19 7892  
 midodrine (PROAMITINE) tablet 7.5 mg  7.5 mg Oral Q8H Paige Hernandez MD   7.5 mg at 02/18/19 0600  
 oxyCODONE IR (ROXICODONE) tablet 5 mg  5 mg Oral Q6H PRN Paige Hernandez MD   5 mg at 02/17/19 2020  insulin lispro (HUMALOG) injection   SubCUTAneous AC&HS Noman Asencio MD   Stopped at 02/17/19 2200  
 glucose chewable tablet 16 g  4 Tab Oral PRN Colette Carey NP      
  glucagon (GLUCAGEN) injection 1 mg  1 mg IntraMUSCular PRN Katelyn LAKE NP      
 dextrose (D50) infusion 12.5-25 g  25-50 mL IntraVENous PRN Rebecca Eagle NP      
 albuterol-ipratropium (DUO-NEB) 2.5 MG-0.5 MG/3 ML  3 mL Nebulization Q4H RT Andrea Michaels MD   3 mL at 02/18/19 1125  
 NOREPINephrine (LEVOPHED) 8 mg in 0.9% NS 250ml infusion  2-16 mcg/min IntraVENous TITRATE Pattie Carlson MD   Stopped at 02/17/19 1640  epoetin briseida (EPOGEN;PROCRIT) injection 10,000 Units  10,000 Units IntraVENous DIALYSIS KITA WOODSON & Becki Gregorio MD   10,000 Units at 02/15/19 1900  
 rifAXIMin (XIFAXAN) tablet 550 mg  550 mg Oral BID Ethel Busch MD   550 mg at 02/18/19 0923  
 lactulose (CHRONULAC) solution 10 g  15 mL Oral TID Ethel Busch MD   10 g at 02/18/19 0548  B complex-vitamin C-folic acid (NEPHRO-NACHO) 0.8 mg tab  1 Tab Oral DAILY Anabel Gallegos MD   1 Tab at 02/18/19 0923  
 arformoterol (BROVANA) neb solution 15 mcg  15 mcg Nebulization BID RT Anabel Gallegos MD   15 mcg at 02/18/19 4463 And  budesonide (PULMICORT) 500 mcg/2 ml nebulizer suspension  500 mcg Nebulization BID RT Anabel Gallegos MD   500 mcg at 02/18/19 2943 Objective: 
Vitals:  
 02/18/19 0900 02/18/19 1000 02/18/19 1058 02/18/19 1131 BP: 103/67 111/62 103/63 Pulse: 96 97 93 Resp: 18 18 18 Temp:   97.9 °F (36.6 °C) TempSrc:      
SpO2: 100% 100% 99% 99% Weight:      
Height:      
 
. Intake/Output Summary (Last 24 hours) at 2/18/2019 1149 Last data filed at 2/18/2019 0400 Gross per 24 hour Intake 805.69 ml Output 1 ml Net 804.69 ml Admission weight:Weight: 61.2 kg (135 lb) (02/08/19 0706) Last Weight Metrics: 
Weight Loss Metrics 2/18/2019 10/30/2018 Today's Wt 119 lb 0.8 oz 139 lb BMI 19.81 kg/m2 23.13 kg/m2 Physical Exam:  
 
General: intubated. Sclerae icteric. ET tube in place. Neck: no jvd. LUNGS: good air entry, bl exp rhonchi. CVS EXM: S1, S2, no murmur, rub or gallop. Abdomen: Soft, Non Tender, moderately distended, non tense ascites. Lower Extremities: No Edema. Access: lt arm avf, good thrill, dressings intact. Labs: CBC w/Diff Recent Labs  
  02/18/19 0410 02/17/19 0335 02/16/19 0310 WBC 7.2 7.9 7.6  
RBC 2.54* 2.65* 2.77* HGB 6.6* 7.0* 7.3* HCT 20.3* 21.3* 21.8*  
PLT 55* 91* 92* GRANS 71 68 72 LYMPH 14* 17* 16* EOS 3 2 1 Chemistry Recent Labs  
  02/18/19 0410 02/17/19 0335 02/16/19 0310 * 109* 100*  137 140  
K 3.8 3.8 3.8 CL 98* 99* 100 CO2 26 25 19* BUN 23* 20* 14  
CREA 4.68* 3.90* 2.66* CA 8.3* 8.8 8.3* AGAP 12 13 21* BUCR 5* 5* 5* ALB 1.7* 1.7* 1.7* PHOS 3.6 3.7 2.6 Lab Results Component Value Date/Time Iron 67 02/09/2019 05:48 AM  
 TIBC 143 (L) 02/09/2019 05:48 AM  
 Iron % saturation 47 02/09/2019 05:48 AM  
  
Lab Results Component Value Date/Time Calcium 8.3 (L) 02/18/2019 04:10 AM  
 CALCIUM,IONIZED 4.50 10/30/2018 11:15 AM  
 Phosphorus 3.6 02/18/2019 04:10 AM  
  
 
 
Kae Harrison M.D [facility] Office [phone] Pager [phone]

## 2019-02-19 PROBLEM — K56.7 ILEUS (HCC): Status: RESOLVED | Noted: 2019-02-17 | Resolved: 2019-02-19

## 2019-02-19 PROBLEM — R17 CHOLESTATIC JAUNDICE: Status: RESOLVED | Noted: 2019-02-12 | Resolved: 2019-02-19

## 2019-02-19 PROBLEM — J96.00 ACUTE RESPIRATORY FAILURE (HCC): Status: RESOLVED | Noted: 2019-02-15 | Resolved: 2019-02-19

## 2019-02-19 PROBLEM — E43 SEVERE PROTEIN-CALORIE MALNUTRITION (HCC): Status: ACTIVE | Noted: 2019-02-19

## 2019-02-19 PROBLEM — I95.9 HYPOTENSION: Status: RESOLVED | Noted: 2019-02-13 | Resolved: 2019-02-19

## 2019-02-19 LAB
ALBUMIN SERPL-MCNC: 1.9 G/DL (ref 3.4–5)
ANION GAP SERPL CALC-SCNC: 11 MMOL/L (ref 3–18)
BASOPHILS # BLD: 0.1 K/UL (ref 0–0.1)
BASOPHILS NFR BLD: 2 % (ref 0–2)
BUN SERPL-MCNC: 16 MG/DL (ref 7–18)
BUN/CREAT SERPL: 4 (ref 12–20)
CALCIUM SERPL-MCNC: 8.5 MG/DL (ref 8.5–10.1)
CHLORIDE SERPL-SCNC: 99 MMOL/L (ref 100–108)
CO2 SERPL-SCNC: 27 MMOL/L (ref 21–32)
CREAT SERPL-MCNC: 3.76 MG/DL (ref 0.6–1.3)
DIFFERENTIAL METHOD BLD: ABNORMAL
EOSINOPHIL # BLD: 0.1 K/UL (ref 0–0.4)
EOSINOPHIL NFR BLD: 2 % (ref 0–5)
ERYTHROCYTE [DISTWIDTH] IN BLOOD BY AUTOMATED COUNT: 25.8 % (ref 11.6–14.5)
GLUCOSE BLD STRIP.AUTO-MCNC: 149 MG/DL (ref 70–110)
GLUCOSE BLD STRIP.AUTO-MCNC: 164 MG/DL (ref 70–110)
GLUCOSE BLD STRIP.AUTO-MCNC: 178 MG/DL (ref 70–110)
GLUCOSE BLD STRIP.AUTO-MCNC: 77 MG/DL (ref 70–110)
GLUCOSE SERPL-MCNC: 139 MG/DL (ref 74–99)
HCT VFR BLD AUTO: 22.6 % (ref 35–45)
HGB BLD-MCNC: 7.3 G/DL (ref 12–16)
INR PPP: 1.3 (ref 0.8–1.2)
LYMPHOCYTES # BLD: 0.9 K/UL (ref 0.9–3.6)
LYMPHOCYTES NFR BLD: 17 % (ref 21–52)
MCH RBC QN AUTO: 26.9 PG (ref 24–34)
MCHC RBC AUTO-ENTMCNC: 32.3 G/DL (ref 31–37)
MCV RBC AUTO: 83.4 FL (ref 74–97)
MONOCYTES # BLD: 0.8 K/UL (ref 0.05–1.2)
MONOCYTES NFR BLD: 14 % (ref 3–10)
NEUTS SEG # BLD: 3.5 K/UL (ref 1.8–8)
NEUTS SEG NFR BLD: 65 % (ref 40–73)
PHOSPHATE SERPL-MCNC: 3.1 MG/DL (ref 2.5–4.9)
PLATELET # BLD AUTO: 64 K/UL (ref 135–420)
POTASSIUM SERPL-SCNC: 3.9 MMOL/L (ref 3.5–5.5)
PROTHROMBIN TIME: 16.4 SEC (ref 11.5–15.2)
RBC # BLD AUTO: 2.71 M/UL (ref 4.2–5.3)
SODIUM SERPL-SCNC: 137 MMOL/L (ref 136–145)
WBC # BLD AUTO: 5.5 K/UL (ref 4.6–13.2)

## 2019-02-19 PROCEDURE — 65660000000 HC RM CCU STEPDOWN

## 2019-02-19 PROCEDURE — 97535 SELF CARE MNGMENT TRAINING: CPT

## 2019-02-19 PROCEDURE — 85025 COMPLETE CBC W/AUTO DIFF WBC: CPT

## 2019-02-19 PROCEDURE — 97530 THERAPEUTIC ACTIVITIES: CPT

## 2019-02-19 PROCEDURE — 74011250636 HC RX REV CODE- 250/636: Performed by: PHYSICIAN ASSISTANT

## 2019-02-19 PROCEDURE — 77010033678 HC OXYGEN DAILY

## 2019-02-19 PROCEDURE — 74011000258 HC RX REV CODE- 258: Performed by: PHYSICIAN ASSISTANT

## 2019-02-19 PROCEDURE — 74011250637 HC RX REV CODE- 250/637: Performed by: HOSPITALIST

## 2019-02-19 PROCEDURE — 74011000250 HC RX REV CODE- 250: Performed by: HOSPITALIST

## 2019-02-19 PROCEDURE — 85610 PROTHROMBIN TIME: CPT

## 2019-02-19 PROCEDURE — 94640 AIRWAY INHALATION TREATMENT: CPT

## 2019-02-19 PROCEDURE — 74011000250 HC RX REV CODE- 250: Performed by: INTERNAL MEDICINE

## 2019-02-19 PROCEDURE — 82962 GLUCOSE BLOOD TEST: CPT

## 2019-02-19 PROCEDURE — 80069 RENAL FUNCTION PANEL: CPT

## 2019-02-19 PROCEDURE — 74011636637 HC RX REV CODE- 636/637: Performed by: FAMILY MEDICINE

## 2019-02-19 PROCEDURE — 36415 COLL VENOUS BLD VENIPUNCTURE: CPT

## 2019-02-19 PROCEDURE — 74011250637 HC RX REV CODE- 250/637: Performed by: INTERNAL MEDICINE

## 2019-02-19 PROCEDURE — 94760 N-INVAS EAR/PLS OXIMETRY 1: CPT

## 2019-02-19 PROCEDURE — 92526 ORAL FUNCTION THERAPY: CPT

## 2019-02-19 RX ORDER — ALBUTEROL SULFATE 0.83 MG/ML
2.5 SOLUTION RESPIRATORY (INHALATION)
Status: DISCONTINUED | OUTPATIENT
Start: 2019-02-19 | End: 2019-02-23 | Stop reason: HOSPADM

## 2019-02-19 RX ORDER — IPRATROPIUM BROMIDE AND ALBUTEROL SULFATE 2.5; .5 MG/3ML; MG/3ML
3 SOLUTION RESPIRATORY (INHALATION)
Status: DISCONTINUED | OUTPATIENT
Start: 2019-02-19 | End: 2019-02-20

## 2019-02-19 RX ADMIN — LACTULOSE 10 G: 20 SOLUTION ORAL at 08:53

## 2019-02-19 RX ADMIN — MIDODRINE HYDROCHLORIDE 7.5 MG: 2.5 TABLET ORAL at 21:09

## 2019-02-19 RX ADMIN — LACTULOSE 10 G: 20 SOLUTION ORAL at 21:07

## 2019-02-19 RX ADMIN — INSULIN LISPRO 2 UNITS: 100 INJECTION, SOLUTION INTRAVENOUS; SUBCUTANEOUS at 12:17

## 2019-02-19 RX ADMIN — IPRATROPIUM BROMIDE AND ALBUTEROL SULFATE 3 ML: .5; 3 SOLUTION RESPIRATORY (INHALATION) at 11:40

## 2019-02-19 RX ADMIN — LACTULOSE 10 G: 20 SOLUTION ORAL at 17:20

## 2019-02-19 RX ADMIN — IPRATROPIUM BROMIDE AND ALBUTEROL SULFATE 3 ML: .5; 3 SOLUTION RESPIRATORY (INHALATION) at 04:10

## 2019-02-19 RX ADMIN — OXYCODONE HYDROCHLORIDE 5 MG: 5 TABLET ORAL at 20:01

## 2019-02-19 RX ADMIN — ARFORMOTEROL TARTRATE 15 MCG: 15 SOLUTION RESPIRATORY (INHALATION) at 08:22

## 2019-02-19 RX ADMIN — RIFAXIMIN 550 MG: 550 TABLET ORAL at 08:53

## 2019-02-19 RX ADMIN — PANTOPRAZOLE SODIUM 40 MG: 40 TABLET, DELAYED RELEASE ORAL at 08:53

## 2019-02-19 RX ADMIN — MIDODRINE HYDROCHLORIDE 7.5 MG: 2.5 TABLET ORAL at 15:27

## 2019-02-19 RX ADMIN — IPRATROPIUM BROMIDE AND ALBUTEROL SULFATE 3 ML: .5; 3 SOLUTION RESPIRATORY (INHALATION) at 15:32

## 2019-02-19 RX ADMIN — IPRATROPIUM BROMIDE AND ALBUTEROL SULFATE 3 ML: .5; 3 SOLUTION RESPIRATORY (INHALATION) at 00:26

## 2019-02-19 RX ADMIN — INSULIN LISPRO 2 UNITS: 100 INJECTION, SOLUTION INTRAVENOUS; SUBCUTANEOUS at 17:20

## 2019-02-19 RX ADMIN — BUDESONIDE 500 MCG: 0.5 INHALANT RESPIRATORY (INHALATION) at 08:22

## 2019-02-19 RX ADMIN — MIDODRINE HYDROCHLORIDE 7.5 MG: 2.5 TABLET ORAL at 08:53

## 2019-02-19 RX ADMIN — IPRATROPIUM BROMIDE AND ALBUTEROL SULFATE 3 ML: .5; 3 SOLUTION RESPIRATORY (INHALATION) at 08:22

## 2019-02-19 RX ADMIN — RIFAXIMIN 550 MG: 550 TABLET ORAL at 19:57

## 2019-02-19 RX ADMIN — CEFTRIAXONE 1 G: 1 INJECTION, POWDER, FOR SOLUTION INTRAMUSCULAR; INTRAVENOUS at 13:16

## 2019-02-19 RX ADMIN — Medication 1 TABLET: at 08:53

## 2019-02-19 RX ADMIN — IPRATROPIUM BROMIDE AND ALBUTEROL SULFATE 3 ML: .5; 3 SOLUTION RESPIRATORY (INHALATION) at 19:26

## 2019-02-19 NOTE — PROGRESS NOTES
Renal Progress Note Follow up of ESRD Assessment/Plan: 1. ESRD. Dialysis tomorrow and continue mwf. Will try to gradually address volume overload. 2. Hypotension due to sbp/liver failure/sepsis. Continue midodrin. 3. Resp failure. Stable after self extubation. 5. Anemia of ckd. Continue greg with dialysis. 6. Secondary hyperparathyroidism of ckd. Continue to hold sensipar due to hypocalcemia. 7 Liver cirrhosis/ascites/sbp/jaundice. GI on the case. EGD results noted- duodenal ulcers, no cbd stone. 8. Systolic chf.  
9. UTI. Finished abx. 10. Altered ms. Improving slowly. Subjective: 
Looks comfortable but is still confused. Dialyzed yesterday, had to be restrained due to confusion, agitation. Tolerates diet, no cp/sob/n/v. Patient Active Problem List  
Diagnosis Code  Metabolic encephalopathy M06.62  
 UTI (urinary tract infection) N39.0  Cirrhosis ([address] K74.60  ESRD (end stage renal disease) on dialysis (HCC) N18.6, Z99.2  COPD (chronic obstructive pulmonary disease) (HCC) J44.9  Hyperbilirubinemia E80.6  Combined systolic and diastolic heart failure (HCC) I50.40  SBP (spontaneous bacterial peritonitis) ([address] K65.2  Anemia, chronic disease D63.8  Coagulopathy ([address] D68.9  Shock ([address] R57.9  Thrombocytopenia ([address] D69.6  Severe protein-calorie malnutrition ([address] E43 Current Facility-Administered Medications Medication Dose Route Frequency Provider Last Rate Last Dose  pantoprazole (PROTONIX) tablet 40 mg  40 mg Oral ACB Jesus Ruiz MD   40 mg at 02/19/19 5907  cefTRIAXone (ROCEPHIN) 1 g in 0.9% sodium chloride (MBP/ADV) 50 mL MBP  1 g IntraVENous Q24H Shahbaz Granado PA-C      
 midodrine (PROAMITINE) tablet 7.5 mg  7.5 mg Oral Q8H Carrington Franklin MD   7.5 mg at 02/19/19 2820  oxyCODONE IR (ROXICODONE) tablet 5 mg  5 mg Oral Q6H PRN Carrington Franklin MD   5 mg at 02/17/19 2020  insulin lispro (HUMALOG) injection   SubCUTAneous AC&HS Noman Asencio MD   Stopped at 02/19/19 0730  
 glucose chewable tablet 16 g  4 Tab Oral PRN Ruth Tello NP      
 glucagon (GLUCAGEN) injection 1 mg  1 mg IntraMUSCular PRN Woo LAKE NP      
 dextrose (D50) infusion 12.5-25 g  25-50 mL IntraVENous PRN Woo LAKE NP      
 albuterol-ipratropium (DUO-NEB) 2.5 MG-0.5 MG/3 ML  3 mL Nebulization Q4H RT Yina Dutton MD   3 mL at 02/19/19 4715  epoetin briseida (EPOGEN;PROCRIT) injection 10,000 Units  10,000 Units IntraVENous DIALYSIS KITA WOODSON & Shweta Rodríguez MD   10,000 Units at 02/18/19 2224  rifAXIMin (XIFAXAN) tablet 550 mg  550 mg Oral BID Mandi Paul MD   550 mg at 02/19/19 8225  lactulose (CHRONULAC) solution 10 g  15 mL Oral TID Mandi Paul MD   10 g at 02/19/19 9937  B complex-vitamin C-folic acid (NEPHRO-NACHO) 0.8 mg tab  1 Tab Oral DAILY Jennifer Smith MD   1 Tab at 02/19/19 1632  arformoterol (BROVANA) neb solution 15 mcg  15 mcg Nebulization BID RT Jennifer Smith MD   15 mcg at 02/19/19 6965 And  budesonide (PULMICORT) 500 mcg/2 ml nebulizer suspension  500 mcg Nebulization BID RT Jennifer Smith MD   500 mcg at 02/19/19 1973 Objective: 
Vitals:  
 02/19/19 0410 02/19/19 6129 02/19/19 9849 02/19/19 0825 BP:  106/64 102/67 Pulse:  98 92 Resp:  18 18 Temp:  97.6 °F (36.4 °C) 97.2 °F (36.2 °C) TempSrc:      
SpO2: 96% 97% 100% 99% Weight:  54.2 kg (119 lb 7.8 oz) Height:      
 
. No intake or output data in the 24 hours ending 02/19/19 1131 Admission weight:Weight: 61.2 kg (135 lb) (02/08/19 0706) Last Weight Metrics: 
Weight Loss Metrics 2/19/2019 10/30/2018 Today's Wt 119 lb 7.8 oz 139 lb BMI 19.88 kg/m2 23.13 kg/m2 Physical Exam:  
 
General: Nad. Sclerae icteric. Neck: no jvd. LUNGS: good air entry, bl exp rhonchi. CVS EXM: S1, S2, no murmur, rub or gallop. Abdomen: Soft, Non Tender, moderately distended, non tense ascites. Lower Extremities: trace Edema. Access: lt arm avf, good thrill, dressings intact. Labs: CBC w/Diff Recent Labs  
  02/19/19 
0730 02/18/19 
0410 02/17/19 
0335 WBC 5.5 7.2 7.9  
RBC 2.71* 2.54* 2.65* HGB 7.3* 6.6* 7.0*  
HCT 22.6* 20.3* 21.3*  
PLT 64* 55* 91* GRANS 65 71 68 LYMPH 17* 14* 17* EOS 2 3 2 Chemistry Recent Labs  
  02/19/19 
0730 02/18/19 
0410 02/17/19 
0335 * 114* 109*  136 137  
K 3.9 3.8 3.8 CL 99* 98* 99* CO2 27 26 25 BUN 16 23* 20* CREA 3.76* 4.68* 3.90* CA 8.5 8.3* 8.8 AGAP 11 12 13 BUCR 4* 5* 5* ALB 1.9* 1.7* 1.7* PHOS 3.1 3.6 3.7 Lab Results Component Value Date/Time Iron 67 02/09/2019 05:48 AM  
 TIBC 143 (L) 02/09/2019 05:48 AM  
 Iron % saturation 47 02/09/2019 05:48 AM  
  
Lab Results Component Value Date/Time Calcium 8.5 02/19/2019 07:30 AM  
 CALCIUM,IONIZED 4.50 10/30/2018 11:15 AM  
 Phosphorus 3.1 02/19/2019 07:30 AM  
  
 
 
Vijaya David M.D [facility] Office [phone] Pager [phone]

## 2019-02-19 NOTE — PROGRESS NOTES
Problem: Pressure Injury - Risk of 
Goal: *Prevention of pressure injury Document Honorio Scale and appropriate interventions in the flowsheet. Outcome: Progressing Towards Goal 
Pressure Injury Interventions: 
Sensory Interventions: Assess changes in LOC Moisture Interventions: Absorbent underpads Activity Interventions: Pressure redistribution bed/mattress(bed type) Mobility Interventions: Pressure redistribution bed/mattress (bed type) Nutrition Interventions: Document food/fluid/supplement intake Friction and Shear Interventions: HOB 30 degrees or less Problem: Falls - Risk of 
Goal: *Absence of Falls Document Koreen Masters Fall Risk and appropriate interventions in the flowsheet. Outcome: Progressing Towards Goal 
Fall Risk Interventions: 
Mobility Interventions: Bed/chair exit alarm Mentation Interventions: Door open when patient unattended Medication Interventions: Teach patient to arise slowly Elimination Interventions: Call light in reach History of Falls Interventions: Room close to nurse's station

## 2019-02-19 NOTE — PROGRESS NOTES
45 yo BF with ESRD and multiple other issues being followed for Cholestatic jaundice. No evidence of biliary obstruction on imaging Studies. T Bili down to 14.8 
  
Plan: continue supportive care Transjugular liver biopsy when more stable Check T Bili

## 2019-02-19 NOTE — PROGRESS NOTES
Internal Medicine Progress Note Patient's Name: Concha Valencia Admit Date: 2/8/2019 Length of Stay: 11 Assessment/Plan Principal Problem: 
  SBP (spontaneous bacterial peritonitis) ([address] (2/12/2019) Active Problems: Metabolic encephalopathy (3/1/5856) UTI (urinary tract infection) (2/8/2019) Cirrhosis ([address] (2/8/2019) ESRD (end stage renal disease) on dialysis ([address] (2/8/2019) COPD (chronic obstructive pulmonary disease) ([address] (2/8/2019) Hyperbilirubinemia (2/8/2019) Combined systolic and diastolic heart failure ([address] (2/10/2019) Anemia, chronic disease (2/15/2019) Coagulopathy ([address] (2/15/2019) Shock ([address] (2/15/2019) Thrombocytopenia ([address] (2/16/2019) Severe protein-calorie malnutrition ([address] (2/19/2019) SBP 
-cont rocephin-10day course completed, continue day to day 
- paracentesis done 2/11, cell count profile consistent with bacterial peritonitis 
  
ESRD on dialysis 
- nephro consult - appreciate assistance 
- HD on 2/9, 2/13, 2/18, 2/20 
- hold dialysis on 2/12 d/t HOTN - midodrine added 
- dialysis done 2/13, albumin given, no fluid pulled off 
  
Cholestatic jaundice 
- CT abd results below - GI consult - appreciate assistance - serologies, paracentesis to eval fluid, start lactulose/rifaximin, vit K x3 days - MRCP w/o contrast, transjugular liver bx ordered - MRCP unable to be done - ERCP done 2/13 - results below, GI recs medical management of decompensated chronic liver disease with cholestasis -Transjugular liver biopsy when mores table 
 -T bili 14.8 
 
UTI 
- iv rocephin x5 days - UCx - negative 
  
Metabolic encephalopathy 
- Head CT - no acute abnormality 
- monitor neuro status - worsened on 2/13 likely secondary to anesthesia - w/d to pain 
  
HF 
- cardio consult - appreciate assistance 
  
COPD 
- brovana, pulmicort 
-Duoneb 
  
Acute Resp failure 
 -stable NC Shock 
-Levophed D/C  
-BP stable - Cont acceptable home medications for chronic conditions  
- DVT protocol I have personally reviewed all pertinent labs and films that have officially resulted over the last 24 hours. I have personally checked for all pending labs that are awaiting final results. Interval History Martina Anguiano a 46 y.o. female with a PMHx ESRD on dialysis, DM, CAD, COPD, syst/diastolic heart failure, HTN, chronic liver disease who presented to the ED from dialysis due to AMS. History limited 2/2 AMS. ED eval revealed scleral icterus, confusion, leukocytosis, UTI. Hyperbilirubinemia (21.7) and elevated alk phos (571) are similar to recent ED visit at [facility] 2/1. Review of prior records from [facility] revealed she was supposed to have a percutaneous gallbladder drain placed on 2/4/19. Head CT neg. Pt will be admitted for further evaluation. IV rocephin started. Nephrology consulted - HD on 2/9 as patient missed the prior day. CT abd without contrast ordered, results below. GI consulted - serologies, paracentesis to eval fluid, start lactulose/rifaximin, vit K x3 days, cardio consult. UCx negative. PT recommending SNF. Paracentesis performed 2/11 - removed 100cc dark jed fluid, cell count profile consistent with SBP. Pt already on rocephin. Fluid cx ngtd. Midodrine added for HOTN. MRCP w/o contrast and transjugular liver bx ordered per GI. MRCP unable to be done 2/2 patient not cooperating. ERCP done on 2/13 instead, no CBD stone or stricture seen. Patient transferred to ICU afterwards 2/2 HOTN and need for pressors instead of IVF due to ESRD. ICU team concerned about AMS and pt's ability to protect airway so patient was intubated. Pt's daughter informed of recent events, wishes for patient to remain full code.   Dialysis 2/15.  SBT, patient self extubated.  Remains on pressors but requiring less.  Passed swallow eval. Pressors D/C, BP stabilizing. Dialysis 2/18, 2/20. Transjuglar liver biopsy when stable. EGD duodenal ulcers, no CBD stone. Subjective Pt s/e @ bedside. Appears confused, talking, answering questions slowly, eating at the moment, denies symptoms SOB, CP, N/V/D Objective Visit Vitals /63 (BP 1 Location: Right arm, BP Patient Position: Head of bed elevated (Comment degrees)) Pulse 95 Temp 97.4 °F (36.3 °C) Resp 20 Ht 5' 5\" (1.651 m) Wt 54.2 kg (119 lb 7.8 oz) SpO2 100% BMI 19.88 kg/m² Physical Exam: 
General Appearance: NAD, icteric sclerae HENT: normocephalic/atraumatic, moist mucus membranes Neck: No JVD, supple Lungs: CTA with normal respiratory effort CV: RRR, no m/r/g Abdomen: soft, non-tender, normal bowel sounds Extremities: no cyanosis, trace edema Neuro: No focal deficits, motor/sensory intact Skin: Normal color, intact Lymphatics: No cervical or supraclavicular lymphadenopathy Psych: appropriate affect, alert and oriented to person, place and time Intake and Output: 
Current Shift:  02/19 0701 - 02/19 1900 In: 120 [P.O.:120] Out: - Last three shifts:  02/17 1901 - 02/19 0700 In: 175 [P.O.:175] Out: -  
 
Lab/Data Reviewed: All lab results for the last 24 hours reviewed. Imaging Reviewed: 
No results found. Medications Reviewed: 
Current Facility-Administered Medications Medication Dose Route Frequency  pantoprazole (PROTONIX) tablet 40 mg  40 mg Oral ACB  cefTRIAXone (ROCEPHIN) 1 g in 0.9% sodium chloride (MBP/ADV) 50 mL MBP  1 g IntraVENous Q24H  
 midodrine (PROAMITINE) tablet 7.5 mg  7.5 mg Oral Q8H  
 oxyCODONE IR (ROXICODONE) tablet 5 mg  5 mg Oral Q6H PRN  
 insulin lispro (HUMALOG) injection   SubCUTAneous AC&HS  
 glucose chewable tablet 16 g  4 Tab Oral PRN  
 glucagon (GLUCAGEN) injection 1 mg  1 mg IntraMUSCular PRN  
 dextrose (D50) infusion 12.5-25 g  25-50 mL IntraVENous PRN  
 albuterol-ipratropium (DUO-NEB) 2.5 MG-0.5 MG/3 ML  3 mL Nebulization Q4H RT  
  epoetin briseida (EPOGEN;PROCRIT) injection 10,000 Units  10,000 Units IntraVENous DIALYSIS MON, WED & FRI  rifAXIMin (XIFAXAN) tablet 550 mg  550 mg Oral BID  lactulose (CHRONULAC) solution 10 g  15 mL Oral TID  B complex-vitamin C-folic acid (NEPHRO-NACHO) 0.8 mg tab  1 Tab Oral DAILY  arformoterol (BROVANA) neb solution 15 mcg  15 mcg Nebulization BID RT And  
 budesonide (PULMICORT) 500 mcg/2 ml nebulizer suspension  500 mcg Nebulization BID RT Renato Hutchinson PA-C [address] [place], [place] [address] Pager: [phone] Office: [phone]

## 2019-02-19 NOTE — PROGRESS NOTES
Problem: Self Care Deficits Care Plan (Adult) Goal: *Acute Goals and Plan of Care (Insert Text) Occupational Therapy Goals Initiated 2/19/2019 within 7 day(s). 1.  Patient will perform grooming tasks at EOB with supervision and minimal verbal cues for attention to task. 2.  Patient will perform upper body dressing with minimal assistance and fair+ dynamic sitting balance. 3.  Patient will perform functional task in standing for 8 minutes with minimal assistance and < 4 rest breaks in prep for functional transfers & ADLs. 4.  Patient will perform toilet transfers with minimal assistance. 5.  Patient will perform all aspects of toileting with minimal assistance. 6.  Patient will participate in upper extremity therapeutic exercise/activities for 8 minutes with supervision/set-up to increase BUE strength for functional transfers and ADLs. 7.  Patient will utilize energy conservation techniques during functional activities with minimal verbal cues. Outcome: [address] Occupational THERAPY: Initial Assessment INPATIENT: Medicare: Hospital Day: 12 Patient: Marissa Mendoza (87 y.o. female)    Date: 2/19/2019 Primary Diagnosis: UTI (urinary tract infection) [N39.0] Metabolic encephalopathy [L52.64] Procedure(s) (LRB): ENDOSCOPIC ULTRASOUND (EUS) (N/A), 6 Days Post-Op, Precautions: Confused and Falls PLOF: Pt reports independence with ADLs PTA; utilized rollator for functional mobility. ASSESSMENT:  
Based on the objective data described below, the patient presents with impairments with regard to bed mobility, activity tolerance and independence in ADLs. Pt supine on arrival, alert, A&O x2. Needs significant time to respond to therapist's questions & follow simple commands.  Pt requires frequent reinforcement for attention to task & command following; sometimes requiring [place] to ensure initiation/completion of functional tasks. Min A for supine-->sit. Pt tolerated ~25 mins of unsupported sitting and occasional assist to maintain midline in sitting during self-feeding, grooming and oral care (see functional levels below). OOB activity not assessed secondary to low H&H (7.3; 22.6). Pt denies dizziness at EOB. Pt reports independence with ADLs PTA. Pt will benefit from continued therapy to maximize strength and independence in mobility & ADLs. Will implement 3 day trial to determine pt's ability to actively participate with therapy. Recommend SNF upon d/c. Recommendations for the next treatment session: C transfer Ms. Ad Jay will benefit from skilled intervention to address the above impairments. Her rehabilitation potential is considered to be Fair. EDUCATION Education:  Patient was educated on the following topics: role of OT and POC; needs reinforcement Barriers to Learning/Limitations: None Compensate with: visual, verbal, tactile, kinesthetic cues/model PLAN OF CARE:  
Problems:  Decreased ROM, Decreased strength affecting function, Decreased ADL/functioning of activities and Decreased transfer abilities Recommendations and Planned Interventions: 
[x]                  Self Care Training                   [x]         Therapeutic Activities [x]                  Functional Mobility Training    []          Cognitive Retraining 
[x]                  Therapeutic Exercises            [x]          Endurance Activities [x]                  Balance Training                     []          Neuromuscular Re-ed []                  Visual/Perceptual Training      [x]       Home Safety Training 
[x]                  Patient Education                    [x]          Family Training/Education []                  Other (comment): Frequency/Duration: Patient will be followed by occupational therapy 3 times a week for a 3 day trial to address goals. Discharge Recommendations: Santhosh Flores Further Equipment Recommendations for Discharge: shower chair SUBJECTIVE:  
Patient stated: \"How long have I been here? \" OBJECTIVE/TREATMENT:  
 
Past Medical History:  
Diagnosis Date  CAD (coronary artery disease) RCA TAMMY (2015)  Cardiomy[facility]  CHF (congestive heart failure) ([facility]  Chronic obstructive pulmonary disease ([facility]  Diabetes ([facility]  ESRD on dialysis ([facility]  HLD (hyperlipidemia)  Hypertension  Liver disease  S/P heart valve repair Past Surgical History:  
Procedure Laterality Date  CARDIAC SURG PROCEDURE UNLIST  [facility] ENDOSCOPIC ULTRASOUND  2/13/2019 Eval Complexity: History: MEDIUM Complexity : Expanded review of history including physical, cognitive and psychosocial  history ; Examination: MEDIUM Complexity : 3-5 performance deficits relating to physical, cognitive , or psychosocial skils that result in activity limitations and / or participation restrictions; Decision Making:MEDIUM Complexity : Patient may present with comorbidities that affect occupational performnce. Miniml to moderate modification of tasks or assistance (eg, physical or verbal ) with assesment(s) is necessary to enable patient to complete evaluation Prior Level of Function/Home Situation: Restricted in physically strenuous activity but ambulatory and able to carry out work of a light or sedentary nature (e.g., light house work, office work). Ambulatory and capable of self-care but unable to carry out any work activities. Up and about more than 50% of waking hours. Lives with Spouse Rollator Cognitive/Behavioral Status:  
Neurologic State: alert Orientation: only aware of  place and person Cognition:  decreased attention/concentration Safety/Judgement: Decreased insight into deficits ROM: minimally limited, moderately limited (BUEs) MMT: 3/5 (BUEs) Coordination: [facility] Hand dominance:Right Skin: Intact (BUEs) Edema: None noted (BUEs) Sensation: Unable to accurately assess Vision/Perceptual: unable to test 
 
 
Functional Status Indep Mod I  
Sup. / 
Set- Up SBA CGA Min Assist  
Mod Assist  
Max assist  
Total Assist  
Assist x2 Additional Time NT Comments Rolling []  []  []  []  []    [x]    []    []  []  []  []  [] Supine to sit []  []  []  []  []  [x]  [x]  []  []  []  []  [] Sit to supine []  []  []  []  []  [x]  [x]  []  []  []  []  [] Sit to stand []  []  []  []  []  []  []  []  []  []  []  [x] Toilet Transfer []  []  []  []  []  []  []  []  []  []  []  [x] Feeding []  []  []  []  []  []  [x]  [x]  []  []  []  []    
Grooming []  []  []  []  []  []  [x]  []  []  []  []  [] Bathing  []  []  []  []  []  []  []  [x]  []  []  []  []    
UB Dressing  []  []  []  []  []  []  []  [x]  []  []  []  []    
LB Dressing  []  []  []  []  []  []  []  [x]  []  []  []  [] Toileting []  []  []  []  []  []  []  [x]  []  []  []  [] Balance Good [address] Poor Unable Comments Sitting static []  [x]  []  [] Sitting dynamic []  [x]  []  []    
Standing static []  []  []  [x] Standing dynamic []  []  []  [x] Therapeutic Activity:  
Pt tolerated ~25 mins sitting at EOB with supervision & occasional CGA for balance. Functional reaching out of TED with CGA For balance and maximum verbal cues for attention to task. ADL Intervention: Mod A for facial hygiene and oral care due to decreased attention, command following and processing. Mod A for self-feeding with regard to food set up, item location, utensil management, and food to mouth. Pain:  
Pre treatment: 0/10 Post treatment: 0/10 Scale: numeric Activity tolerance:  poor+ COMMUNICATION/EDUCATION: Pt educated on role of OT and POC; needs reinforcement.   
[x]         Fall prevention education was provided and the patient/caregiver indicated understanding. [x]         Patient/family have participated as able in goal setting and plan of care. [x]         Patient/family agree to work toward stated goals and plan of care. []         Patient understands intent and goals of therapy, but is neutral about his/her participation The patients plan of care was also discussed with: Physical Therapist, Certified Occupational Therapy Assistant and Registered Nurse. · After treatment position/precautions:  
o Supine in bed 
o Bed alarm/tab alert on 
o Bed in low position 
o Call light within reach 
o RN notified Recommendations for nursing: up with assist x1 Written on communication board: yes Thank you for this referral. 
Farrah Cornelius MS OTR/L Time Calculation: 40 mins

## 2019-02-19 NOTE — PROGRESS NOTES
I have spoken to the patient about her transition plan. She was unable to provide any specific direction. I have called and spoken to the patients daughter, Noemi Young  and the patients  Елена Arizmendi . Both agreed that the patient will need a period of rehab following hospitalization. Both agreed to matching to [facility] facilities. Telephone consent for matching to obtained. I have explained to the patients family that not every facility can accept dialysis patient and have left a SNF list in the patients room for the family. Matched in [facility]. Patients Medicaid is pending according to [employer].  Ismael Li RN

## 2019-02-19 NOTE — PROGRESS NOTES
Bedside shift change report given to Dionna RN by Zoila Henderson, RN. Report included the following information SBAR, Kardex, Intake/Output, MAR and Cardiac Rhythm NSR w/PVCs. 
  
2000  Shift assessment, denies pain.  
  
0010  In bed awake, denies pain. 0300  Notified Dr Ольга Sanches of H&H of 6.6 and epogen given @ [id number]. Advised that pt would have to be transfused on her next dialysis day. Pt in bed, VSS. 0550  In bed resting, incontinence care provided by assigned aide. Bed pad, draw sheet changed. Pt slept for about 2 hours during the night. Bedside report given to [place]., RN by me. Pt in bed resting.

## 2019-02-19 NOTE — PROGRESS NOTES
Problem: Pressure Injury - Risk of 
Goal: *Prevention of pressure injury Document Honorio Scale and appropriate interventions in the flowsheet. Outcome: Progressing Towards Goal 
Pressure Injury Interventions: 
Sensory Interventions: Assess need for specialty bed, Assess changes in LOC, Keep linens dry and wrinkle-free, Minimize linen layers, Maintain/enhance activity level Moisture Interventions: Absorbent underpads, Maintain skin hydration (lotion/cream), Minimize layers Activity Interventions: Pressure redistribution bed/mattress(bed type), PT/OT evaluation Mobility Interventions: Pressure redistribution bed/mattress (bed type), HOB 30 degrees or less Nutrition Interventions: Document food/fluid/supplement intake Friction and Shear Interventions: HOB 30 degrees or less, Minimize layers, Foam dressings/transparent film/skin sealants Problem: Falls - Risk of 
Goal: *Absence of Falls Document Trevor Macleod Fall Risk and appropriate interventions in the flowsheet. Outcome: Progressing Towards Goal 
Fall Risk Interventions: 
Mobility Interventions: Bed/chair exit alarm, Patient to call before getting OOB Mentation Interventions: Door open when patient unattended, Reorient patient, Room close to nurse's station Medication Interventions: Teach patient to arise slowly, Patient to call before getting OOB Elimination Interventions: Call light in reach, Patient to call for help with toileting needs, Toilet paper/wipes in reach History of Falls Interventions: Room close to nurse's station, Door open when patient unattended

## 2019-02-19 NOTE — PROGRESS NOTES
Problem: Dysphagia (Adult) Goal: *Acute Goals and Plan of Care (Insert Text) Recommendations: 
Diet: Puree/thin Meds: Crushed in puree Aspiration Precautions Oral Care TID Other: Feeding assistance Goals:  Patient will: 1. Tolerate PO trials with 0 s/s overt distress in 4/5 trials 2. Utilize compensatory swallow strategies/maneuvers (decrease bite/sip, size/rate, alt. liq/sol) with min cues in 4/5 trials 3. Perform oral-motor/laryngeal exercises to increase oropharyngeal swallow function with min cues 4. Complete an objective swallow study (i.e., MBSS) to assess swallow integrity, r/o aspiration, and determine of safest LRD, min A Outcome: Resolved/Met Date Met: 02/19/19 Speech language pathology dysphagia treatment & discharge Patient: Agustín Bernard (27 y.o. female) Date: 2/19/2019 Diagnosis: UTI (urinary tract infection) [N39.0] Metabolic encephalopathy [A87.11] [facility] Procedure(s) (LRB): ENDOSCOPIC ULTRASOUND (EUS) (N/A) 6 Days Post-Op Precautions: Aspiration, Fall, Skin ASSESSMENT: 
Pt seen for dysphagia f/u this afternoon; A&O to person and location (hospital); intermittent wet cough at baseline. Accepted trials of thin liquid +straw and puree with mildly delayed A-P transit and swallow timing/reflex, however, able to manipulate and clear with 0 clinical s/sx aspiration. Continues to exhibit significant attention impairment, hindering po intake. Pt continues safe for puree, thin liquid diet with aspiration precautions, feeding assistance. Educated pt on aspiration precautions and importance of compensatory swallow techniques to decrease aspiration risk (decrease rate of intake & sip/bite size, upright @HOB for all po intake and ~30 minutes after po); verbalized comprehension. Maximum therapeutic gains met; safest, least restrictive diet achieved in current in-patient/acute setting. Accordingly, SLP to d/c intervention at this time. Progression toward goals: 
[x]         Improving appropriately - goals met/approximated 
[]         Not making progress/Not appropriate - will d/c POC PLAN: 
Recommendations and Planned Interventions: 
Maximum therapeutic gains met; safest, least restrictive diet achieved. D/C ST intervention at this time. Discharge Recommendations:  Santhosh Flores SUBJECTIVE:  
Patient stated Please be patient with me. OBJECTIVE:  
Cognitive and Communication Status: 
Neurologic State: Alert, Confused Orientation Level: Oriented to person, Oriented to place Cognition: Follows commands Perception: Appears intact Perseveration: Perseverates during conversation Safety/Judgement: Decreased awareness of environment, Decreased awareness of need for assistance, Decreased awareness of need for safety, Decreased insight into deficits Dysphagia Treatment: 
Oral Assessment: 
Oral Assessment Labial: Decreased rate Dentition: Edentulous (family stated dentures at home) Oral Hygiene: Claybon Dave Lingual: No impairment Velum: No impairment Mandible: No impairment P.O. Trials: 
 Patient Position: [id number] Vocal quality prior to P.O.: No impairment Consistency Presented: Thin liquid, Puree How Presented: SLP-fed/presented, Successive swallows, Straw Bolus Acceptance: No impairment Bolus Formation/Control: Impaired Type of Impairment: Delayed mastication Propulsion: Delayed (# of seconds) Oral Residue: None Initiation of Swallow: Delayed (# of seconds) Laryngeal Elevation: Decreased Aspiration Signs/Symptoms: None Pharyngeal Phase Characteristics: Multiple swallows, Poor endurance Effective Modifications: Alternate liquids/solids, Small sips and bites Cues for Modifications: Maximal 
   
 Oral Phase Severity: Moderate-severe Pharyngeal Phase Severity : Mild PAIN: 
Start of Tx: 0 End of Tx: 0 The severity rating is based on the following outcomes: SHANE Noms Swallow Level 5 After treatment:  
[]              Patient left in no apparent distress sitting up in chair 
[x]              Patient left in no apparent distress in bed 
[x]              Call bell left within reach [x]              Nursing notified 
[x]              Caregiver present 
[]              Bed alarm activated COMMUNICATION/EDUCATION:  
[x] Aspiration precautions; swallow safety; compensatory techniques []        Patient unable to participate in education; education ongoing with staff 
[]  Posted safety precautions in patient's room. [] Oral-motor/laryngeal strengthening exercises MARIZOL Ruelas Time Calculation: 14 mins

## 2019-02-19 NOTE — PROGRESS NOTES
Pulmonary progress note History 57-year-old woman with a history of cirrhosis who was admitted to the hospital on 2/8/19 with altered mental status. She was found to have spontaneous bacterial peritonitis and a urinary tract infection, and went on to develop septic shock. Her hospital course has been complicated by respiratory failure. The patient self extubated on 2/16/19. She has had renal failure that warranted dialysis. Her known, presumed nonischemic, cardiomyopathy with LVEF of 21-25% with grade 2 diastolic dysfunction pulmonary hypertension contributed to fluid overload and persistent hypotension. By description in various notes, the patient's mental status improved. She was transferred to the medical floor on 2/18/19. Patient denies significant shortness of breath at rest.  She believes she might have marginal improvement in any respiratory discomfort with nebulized therapies. Outpatient she uses all of her pulmonary therapies when she feels as though she needs them. Exam 
She is alert and answers questions. She processes information slowly and is not clearly oriented. Blood pressure 98/63, pulse 96, temperature 98.2 °F (36.8 °C), resp. rate 18, height 5' 5\" (1.651 m), weight 54.2 kg (119 lb 7.8 oz), SpO2 100 %. Sclera are jaundiced. Gaze is conjugate. Extraocular muscles appear intact. Trachea is midline Lungs are clear to auscultation. There is no excessive respiratory effort. Regular rate and rhythm no cyanosis or clubbing Labs 2/19/19: WBC 5.5, hemoglobin 7.3 and platelets 64. INR is 1.3. Sodium is 137, BUN is 16 and creatinine is 3.76. Albumin is 1.9. Last chest x-ray was on 2/16/19. Chest x-ray shows retrocardiac air bronchograms. The right lung is relatively clear. Assessment Acute respiratory failure tolerating extubation. No suggestion of CO2 retention on ABGs. As needed use of outpatient pulmonary therapies Severe cardiomyopathy Acute renal failure Septic shock, resolved Altered mental status Plan Discontinue Pulmicort and Brovana Duo nebs every 6 hours As needed albuterol nebs

## 2019-02-19 NOTE — PROGRESS NOTES
Problem: Mobility Impaired (Adult and Pediatric) Goal: *Acute Goals and Plan of Care (Insert Text) Physical Therapy Goals Re-evaluated 2/17/2019; Updated goals and to be accomplished within 7 day(s) 1. Patient will move from supine to sit and sit to supine  in bed with modified independence. 2.  Patient will transfer from bed to chair and chair to bed with modified independence using the least restrictive device. 3.  Patient will perform sit to stand with modified independence. 4.  Patient will ambulate with modified independence for 50 feet with the least restrictive device. Initiated 2/11/2019 and to be accomplished within 3 day trial.  
If appropriate continue per plan of care to accomplish goals within 7 days. 1.  Patient will maintain eyes open for greater than 90% of session to allow for active participation in mobility training. 2.  Patient will follow 90% of commands to maximize safety and active participation in mobility training. 3.  Patient will move from supine to sit and sit to supine in bed with supervision/set-up. 4.  Patient will maintain seated at edge of bed for 8 min with supervision/set-up to prepare for out of bed activity. 5.  Patient will perform sit to stand with minimal assistance/contact guard assist. 
6.  Patient will transfer from bed to chair and chair to bed with minimal assistance/contact guard assist using the least restrictive device. 7.  Patient will ambulate with minimal assistance/contact guard assist for 10 feet with the least restrictive device. Outcome: Progressing Towards Goal 
 
PHYSICAL THERAPY: Daily TREATMENT Note INPATIENT: Medicare: Hospital Day: 12 Patient: Nemo Crowe (89 y.o. female)    Date: 2/19/2019 Primary Diagnosis: UTI (urinary tract infection) [N39.0] Metabolic encephalopathy [P62.25] Procedure(s) (LRB): ENDOSCOPIC ULTRASOUND (EUS) (N/A), 6 Days Post-Op, Precautions: Fall, Skin Chart, physical therapy assessment, plan of care and goals were reviewed. PLOF: Ambulation with rollator ASSESSMENT: 
Patient supine in bed, agreeable to participation with PT with encouragement. Patient requires frequent redirection and additional time to complete tasks. Patient perseverates in conversation and with mobility. MOD A x 1 for supine > sit. Patient demos good seated balance at EOB. Wound care present to assess wound on bottom. MAX A x 1 for sit > stand with rollator for support. Patient's standing balance is poor; requires constant support and MIN A for standing balance. Tolerates standing EOB x ~5 minutes. Gait deferred d/t poor standing balance. Patient requires encouragement to return to bed. MOD a x 1 for sit > supine. Patient positioned for comfort with bed in chair position; bed alarm activated. Progression toward goals: 
    []  Improving appropriately and progressing toward goals [x]  Improving slowly and progressing toward goals 
    [] Not making progress toward goals and plan of care will be adjusted PLAN: 
Patient continues to benefit from skilled intervention to address the above impairments. Continue treatment per established plan of care. EDUCATION:  
Education:  Patient was educated on the following topics: Bed mobility, transfers, safety. Needs reinforcement. Barriers to Learning/Limitations: yes;  altered mental status (i.e.Sedation, Confusion) Compensate with: visual, verbal, tactile, kinesthetic cues/model Discharge Recommendations:  Santhosh Flores Further Equipment Recommendations for Discharge:  rolling walker Factors which may impact discharge planning: Medical condition, progression with PT.   
 
SUBJECTIVE:  
Patient stated Karen Soria wants my .  OBJECTIVE DATA SUMMARY:  
Critical Behavior: 
Neurologic State: Alert, Confused Orientation Level: Oriented to person Cognition: Follows commands Safety/Judgement: Decreased insight into deficits Functional Mobility: 
 
 
Functional Status Indep (I) Mod I Super-vision Min A Mod A Max A Total A Assist x2 Verbal cues Additional time Not tested Comments Rolling []  []  [] []    []    []  []  [] [] [] [x] Supine to sit []  []  [] []  [x]  []  []  [] [] [] [] Sit to supine []  []  [] []  [x]  []  []  [] [] [] [] Sit to stand []  []  [] []  []  [x]  []  [] [] [] []   
Stand to sit []  []  [] []  [x]  []  []  [] [] [] [] Bed to chair transfers []  []  [] []  []  []  []  [] [] [] [x] Balance Good Beuford Bustard Poor Unable Not tested Comments Sitting static [x]  []  []  []  [] Sitting dynamic [x]  []  []  []  []   
Standing static []  []  [x]  []  []   
Standing dynamic []  []  []  []  [x] Vital Signs Temp: 97.4 °F (36.3 °C) Pulse (Heart Rate): 95    
BP: 107/63 Resp Rate: 20    
O2 Sat (%): 99 % Pain:None Activity Tolerance:  
Fair After treatment:  
[]  Patient left in no apparent distress sitting up in chair 
[x]  Patient left in no apparent distress in bed 
[x]  Call bell left within reach 
[]  Nursing notified 
[]  Caregiver presen 
[]  Bed alarm activated Shirley Villar Time Calculation: 54 mins

## 2019-02-20 LAB
ALBUMIN SERPL-MCNC: 1.9 G/DL (ref 3.4–5)
ANION GAP SERPL CALC-SCNC: 11 MMOL/L (ref 3–18)
BASOPHILS # BLD: 0.1 K/UL (ref 0–0.1)
BASOPHILS NFR BLD: 2 % (ref 0–2)
BILIRUB SERPL-MCNC: 8.3 MG/DL (ref 0.2–1)
BUN SERPL-MCNC: 19 MG/DL (ref 7–18)
BUN/CREAT SERPL: 4 (ref 12–20)
CALCIUM SERPL-MCNC: 8.9 MG/DL (ref 8.5–10.1)
CHLORIDE SERPL-SCNC: 98 MMOL/L (ref 100–108)
CO2 SERPL-SCNC: 26 MMOL/L (ref 21–32)
CREAT SERPL-MCNC: 4.5 MG/DL (ref 0.6–1.3)
DIFFERENTIAL METHOD BLD: ABNORMAL
EOSINOPHIL # BLD: 0.1 K/UL (ref 0–0.4)
EOSINOPHIL NFR BLD: 3 % (ref 0–5)
ERYTHROCYTE [DISTWIDTH] IN BLOOD BY AUTOMATED COUNT: 26 % (ref 11.6–14.5)
GLUCOSE BLD STRIP.AUTO-MCNC: 131 MG/DL (ref 70–110)
GLUCOSE BLD STRIP.AUTO-MCNC: 151 MG/DL (ref 70–110)
GLUCOSE BLD STRIP.AUTO-MCNC: 173 MG/DL (ref 70–110)
GLUCOSE BLD STRIP.AUTO-MCNC: 185 MG/DL (ref 70–110)
GLUCOSE SERPL-MCNC: 126 MG/DL (ref 74–99)
HCT VFR BLD AUTO: 20.4 % (ref 35–45)
HCT VFR BLD AUTO: 27.9 % (ref 35–45)
HGB BLD-MCNC: 6.6 G/DL (ref 12–16)
HGB BLD-MCNC: 9 G/DL (ref 12–16)
INR PPP: 1.4 (ref 0.8–1.2)
LYMPHOCYTES # BLD: 1.4 K/UL (ref 0.9–3.6)
LYMPHOCYTES NFR BLD: 25 % (ref 21–52)
MCH RBC QN AUTO: 26.2 PG (ref 24–34)
MCHC RBC AUTO-ENTMCNC: 32.4 G/DL (ref 31–37)
MCV RBC AUTO: 81 FL (ref 74–97)
MONOCYTES # BLD: 0.8 K/UL (ref 0.05–1.2)
MONOCYTES NFR BLD: 14 % (ref 3–10)
NEUTS SEG # BLD: 3.2 K/UL (ref 1.8–8)
NEUTS SEG NFR BLD: 56 % (ref 40–73)
PHOSPHATE SERPL-MCNC: 3.1 MG/DL (ref 2.5–4.9)
PLATELET # BLD AUTO: 83 K/UL (ref 135–420)
POTASSIUM SERPL-SCNC: 3.8 MMOL/L (ref 3.5–5.5)
PROTHROMBIN TIME: 17.3 SEC (ref 11.5–15.2)
RBC # BLD AUTO: 2.52 M/UL (ref 4.2–5.3)
SODIUM SERPL-SCNC: 135 MMOL/L (ref 136–145)
WBC # BLD AUTO: 5.7 K/UL (ref 4.6–13.2)

## 2019-02-20 PROCEDURE — 90935 HEMODIALYSIS ONE EVALUATION: CPT

## 2019-02-20 PROCEDURE — 36430 TRANSFUSION BLD/BLD COMPNT: CPT

## 2019-02-20 PROCEDURE — 85025 COMPLETE CBC W/AUTO DIFF WBC: CPT

## 2019-02-20 PROCEDURE — P9016 RBC LEUKOCYTES REDUCED: HCPCS

## 2019-02-20 PROCEDURE — 74011250637 HC RX REV CODE- 250/637: Performed by: HOSPITALIST

## 2019-02-20 PROCEDURE — 82247 BILIRUBIN TOTAL: CPT

## 2019-02-20 PROCEDURE — 86923 COMPATIBILITY TEST ELECTRIC: CPT

## 2019-02-20 PROCEDURE — 94760 N-INVAS EAR/PLS OXIMETRY 1: CPT

## 2019-02-20 PROCEDURE — 74011250636 HC RX REV CODE- 250/636: Performed by: INTERNAL MEDICINE

## 2019-02-20 PROCEDURE — 74011000250 HC RX REV CODE- 250: Performed by: INTERNAL MEDICINE

## 2019-02-20 PROCEDURE — 74011636637 HC RX REV CODE- 636/637: Performed by: FAMILY MEDICINE

## 2019-02-20 PROCEDURE — 65660000000 HC RM CCU STEPDOWN

## 2019-02-20 PROCEDURE — 85018 HEMOGLOBIN: CPT

## 2019-02-20 PROCEDURE — 94640 AIRWAY INHALATION TREATMENT: CPT

## 2019-02-20 PROCEDURE — 86900 BLOOD TYPING SEROLOGIC ABO: CPT

## 2019-02-20 PROCEDURE — 82962 GLUCOSE BLOOD TEST: CPT

## 2019-02-20 PROCEDURE — 80069 RENAL FUNCTION PANEL: CPT

## 2019-02-20 PROCEDURE — 74011250636 HC RX REV CODE- 250/636: Performed by: PHYSICIAN ASSISTANT

## 2019-02-20 PROCEDURE — 30233N1 TRANSFUSION OF NONAUTOLOGOUS RED BLOOD CELLS INTO PERIPHERAL VEIN, PERCUTANEOUS APPROACH: ICD-10-PCS | Performed by: HOSPITALIST

## 2019-02-20 PROCEDURE — 77030037870 HC GLD SHT PREVALON SAGE -B

## 2019-02-20 PROCEDURE — 36415 COLL VENOUS BLD VENIPUNCTURE: CPT

## 2019-02-20 PROCEDURE — 74011250637 HC RX REV CODE- 250/637: Performed by: INTERNAL MEDICINE

## 2019-02-20 PROCEDURE — 85610 PROTHROMBIN TIME: CPT

## 2019-02-20 PROCEDURE — 74011000258 HC RX REV CODE- 258: Performed by: PHYSICIAN ASSISTANT

## 2019-02-20 RX ORDER — SODIUM CHLORIDE 9 MG/ML
250 INJECTION, SOLUTION INTRAVENOUS AS NEEDED
Status: DISCONTINUED | OUTPATIENT
Start: 2019-02-20 | End: 2019-02-23 | Stop reason: HOSPADM

## 2019-02-20 RX ORDER — IPRATROPIUM BROMIDE AND ALBUTEROL SULFATE 2.5; .5 MG/3ML; MG/3ML
3 SOLUTION RESPIRATORY (INHALATION)
Status: DISCONTINUED | OUTPATIENT
Start: 2019-02-20 | End: 2019-02-21

## 2019-02-20 RX ADMIN — OXYCODONE HYDROCHLORIDE 5 MG: 5 TABLET ORAL at 09:04

## 2019-02-20 RX ADMIN — MIDODRINE HYDROCHLORIDE 7.5 MG: 2.5 TABLET ORAL at 05:10

## 2019-02-20 RX ADMIN — OXYCODONE HYDROCHLORIDE 5 MG: 5 TABLET ORAL at 21:54

## 2019-02-20 RX ADMIN — RIFAXIMIN 550 MG: 550 TABLET ORAL at 09:04

## 2019-02-20 RX ADMIN — IPRATROPIUM BROMIDE AND ALBUTEROL SULFATE 3 ML: .5; 3 SOLUTION RESPIRATORY (INHALATION) at 07:26

## 2019-02-20 RX ADMIN — CEFTRIAXONE 1 G: 1 INJECTION, POWDER, FOR SOLUTION INTRAMUSCULAR; INTRAVENOUS at 12:47

## 2019-02-20 RX ADMIN — Medication 1 TABLET: at 09:04

## 2019-02-20 RX ADMIN — INSULIN LISPRO 2 UNITS: 100 INJECTION, SOLUTION INTRAVENOUS; SUBCUTANEOUS at 21:55

## 2019-02-20 RX ADMIN — LACTULOSE 10 G: 20 SOLUTION ORAL at 18:04

## 2019-02-20 RX ADMIN — RIFAXIMIN 550 MG: 550 TABLET ORAL at 21:55

## 2019-02-20 RX ADMIN — IPRATROPIUM BROMIDE AND ALBUTEROL SULFATE 3 ML: .5; 3 SOLUTION RESPIRATORY (INHALATION) at 01:06

## 2019-02-20 RX ADMIN — LACTULOSE 10 G: 20 SOLUTION ORAL at 22:02

## 2019-02-20 RX ADMIN — OXYCODONE HYDROCHLORIDE 5 MG: 5 TABLET ORAL at 01:36

## 2019-02-20 RX ADMIN — IPRATROPIUM BROMIDE AND ALBUTEROL SULFATE 3 ML: .5; 3 SOLUTION RESPIRATORY (INHALATION) at 22:56

## 2019-02-20 RX ADMIN — LACTULOSE 10 G: 20 SOLUTION ORAL at 09:04

## 2019-02-20 RX ADMIN — MIDODRINE HYDROCHLORIDE 7.5 MG: 2.5 TABLET ORAL at 18:07

## 2019-02-20 RX ADMIN — PANTOPRAZOLE SODIUM 40 MG: 40 TABLET, DELAYED RELEASE ORAL at 09:04

## 2019-02-20 RX ADMIN — ERYTHROPOIETIN 10000 UNITS: 10000 INJECTION, SOLUTION INTRAVENOUS; SUBCUTANEOUS at 21:56

## 2019-02-20 NOTE — PROGRESS NOTES
Problem: Pressure Injury - Risk of 
Goal: *Prevention of pressure injury Document Honorio Scale and appropriate interventions in the flowsheet. Outcome: Progressing Towards Goal 
Pressure Injury Interventions: 
Sensory Interventions: Avoid rigorous massage over bony prominences Moisture Interventions: Absorbent underpads Activity Interventions: Pressure redistribution bed/mattress(bed type) Mobility Interventions: HOB 30 degrees or less Nutrition Interventions: Document food/fluid/supplement intake Friction and Shear Interventions: Minimize layers Problem: Falls - Risk of 
Goal: *Absence of Falls Document Cy Newsoms Fall Risk and appropriate interventions in the flowsheet. Outcome: Progressing Towards Goal 
Fall Risk Interventions: 
Mobility Interventions: Bed/chair exit alarm Mentation Interventions: Door open when patient unattended Medication Interventions: Patient to call before getting OOB Elimination Interventions: Call light in reach History of Falls Interventions: Room close to nurse's station

## 2019-02-20 NOTE — PROGRESS NOTES
Problem: Falls - Risk of 
Goal: *Absence of Falls Document Hamp Tabitha Fall Risk and appropriate interventions in the flowsheet. Outcome: Progressing Towards Goal 
Fall Risk Interventions: 
Mobility Interventions: Bed/chair exit alarm Mentation Interventions: Door open when patient unattended Medication Interventions: Patient to call before getting OOB Elimination Interventions: Call light in reach History of Falls Interventions: Room close to nurse's station

## 2019-02-20 NOTE — DIALYSIS
ACUTE HEMODIALYSIS FLOW SHEET 
 
HEMODIALYSIS ORDERS: Physician: NOE Garcia MD 
  
Dialyzer: revaclear   Duration: 3.5 hr  BFR: 350   DFR: 800 Dialysate:  Temp 35.5 K+   3    Ca+  3 Na 140 Bicarb 35 Weight:  60.6 kg    Patient Chart []     Unable to Obtain []   Dry weight/UF Goal: 1500 ml Access LUE AVG Needle Gauge 15 Heparin []  Bolus      Units    [] Hourly       Units    [x]None Catheter locking solution NA Pre BP:   103/64    Pulse:     91      Respirations: 18 Temperature:   97.4 Ax  Tx: NS   250    ml/Bolus  Other        [] N/A Labs: Pre        Post:        [] N/A Additional Orders(medications, blood products, hypotension management):   Epogen    [] N/A [x] [facility] Consent Verified CATHETER ACCESS: [x]N/A   []Right   []Left   []IJ     []Fem   []Chest wall  
[] First use X-ray verified     []Tunnel                [] Non Tunneled []No S/S infection  []Redness  []Drainage []Cultured []Swelling []Pain []Medical Aseptic Prep Utilized   []Dressing Changed  [] Biopatch  Date:      
[]Clotted   []Patent   Flows: []Good  []Poor  []Reversed If access problem,  notified: []Yes    [x]N/A  Date:        
 
GRAFT/FISTULA ACCESS:  []N/A     []Right     [x]Left     [x]UE     []LE [x]AVG   []AVF        []Buttonhole    [x]Medical Aseptic Prep Utilized []No S/S infection  []Redness  []Drainage []Cultured []Swelling []Pain Bruit:   [x] Strong    [] Weak       Thrill :   [x] Strong    [] Weak Needle Gauge: 15   Length: 1 inch If access problem,  notified: []Yes     [x]N/A  Date:       
Please describe access if present and not used:  
 
 
GENERAL ASSESSMENT:   
LUNGS:  Rate 20 SaO2%        [] N/A    [] Clear  [] Coarse  [] Crackles  [] Wheezing 
      [] Diminished     Location : []RLL   []LLL    []RUL  []ADINA Cough: []Productive  []Dry  []N/A   Respirations:  []Easy  []Labored Therapy:  []RA  []NC 2 l/min    Mask: []NRB []Venti       O2% []Ventilator  []Intubated  [] Trach  [] BiPaP CARDIAC: [x]Regular      [] Irregular   [] Pericardial Rub  [] JVD []  Monitored  [] Bedside  [] Remotely monitored [] N/A  Rhythm: EDEMA: [x] None  []Generalized  [] Pitting [] 1    [] 2    [] 3    [] 4 [] Facial  [] Pedal  []  UE  [] LE  
SKIN:   [x] Warm  [] Hot     [] Cold   [x] Dry     [] Pale   [] Diaphoretic    
             [] Flushed  [] Jaundiced  [] Cyanotic  [] Rash  [] Weeping LOC:    [x] Alert      [x]Oriented:    [x] Person     [x] Place  [x]Time 
             [] Confused  [x] Lethargic  [] Medicated  [] Non-responsive GI / ABDOMEN:  [] Flat    [] Distended    [x] Soft    [] Firm   []  Obese 
                           [] Diarrhea  [] Bowel Sounds  [] Nausea  [] Vomiting  / URINE ASSESSMENT:[] Voiding   [] Oliguria  [] Anuria   []  Reis [] Incontinent    []  Incontinent Brief      []  Bathroom Privileges PAIN: [x] 0 []1  []2   []3   []4   []5   []6   []7   []8   []9   []10 Scale 0-10  Action/Follow Up: MOBILITY:  [] Amb    [] Amb/Assist    [x] Bed    [] Wheelchair  [] Stretcher All Vitals and Treatment Details on Attached Flowsheet Hospital: [facility] Room # [id number] [] 1st Time Acute  [] Stat  [x] Routine  [] Urgent [x] Acute Room  []  Bedside  [] ICU/CCU  [] ER Isolation Precautions:  [x] Dialysis   [] Airborne   [] Contact    [] Reverse Special Considerations:         [] Blood Consent Verified [x]N/A ALLERGIES:   [x] NKA Code Status:  [x] Full Code  [] DNR  [] Other HBsAg ONLY: Date Drawn 2/8/2019      [x]Negative []Positive []Unknown HBsAb: Date 2/8/2019    [] Susceptible   [x] [id number] []Not Drawn  [] Drawn Current Labs:    Date of Labs: 2/20/2019          Today [x] Cut and paste current labs here. Results for Kate Stover (MRN [id number]) as of 2/20/2019 15:40 Ref.  Range 2/20/2019 04:50  
 WBC Latest Ref Range: 4.6 - 13.2 K/uL 5.7 RBC Latest Ref Range: 4.20 - 5.30 M/uL 2.52 (L) HGB Latest Ref Range: 12.0 - 16.0 g/dL 6.6 (L) HCT Latest Ref Range: 35.0 - 45.0 % 20.4 (L) MCV Latest Ref Range: 74.0 - 97.0 FL 81.0 MCH Latest Ref Range: 24.0 - 34.0 PG 26.2 MCHC Latest Ref Range: 31.0 - 37.0 g/dL 32.4 RDW Latest Ref Range: 11.6 - 14.5 % 26.0 (H) PLATELET Latest Ref Range: 135 - 420 K/uL 83 (L) NEUTROPHILS Latest Ref Range: 40 - 73 % 56 LYMPHOCYTES Latest Ref Range: 21 - 52 % 25 MONOCYTES Latest Ref Range: 3 - 10 % 14 (H) EOSINOPHILS Latest Ref Range: 0 - 5 % 3  
BASOPHILS Latest Ref Range: 0 - 2 % 2 DF Latest Units:   AUTOMATED  
ABS. NEUTROPHILS Latest Ref Range: 1.8 - 8.0 K/UL 3.2  
ABS. LYMPHOCYTES Latest Ref Range: 0.9 - 3.6 K/UL 1.4  
ABS. MONOCYTES Latest Ref Range: 0.05 - 1.2 K/UL 0.8 ABS. EOSINOPHILS Latest Ref Range: 0.0 - 0.4 K/UL 0.1 ABS. BASOPHILS Latest Ref Range: 0.0 - 0.1 K/UL 0.1 INR Latest Ref Range: 0.8 - 1.2   1.4 (H) Prothrombin time Latest Ref Range: 11.5 - 15.2 sec 17.3 (H) Sodium Latest Ref Range: 136 - 145 mmol/L 135 (L) Potassium Latest Ref Range: 3.5 - 5.5 mmol/L 3.8 Chloride Latest Ref Range: 100 - 108 mmol/L 98 (L)  
CO2 Latest Ref Range: 21 - 32 mmol/L 26 Anion gap Latest Ref Range: 3.0 - 18 mmol/L 11 Glucose Latest Ref Range: 74 - 99 mg/dL 126 (H) BUN Latest Ref Range: 7.0 - 18 MG/DL 19 (H) Creatinine Latest Ref Range: 0.6 - 1.3 MG/DL 4.50 (H) BUN/Creatinine ratio Latest Ref Range: 12 - 20   4 (L) Calcium Latest Ref Range: 8.5 - 10.1 MG/DL 8.9 Phosphorus Latest Ref Range: 2.5 - 4.9 MG/DL 3.1 GFR est non-AA Latest Ref Range: >60 ml/min/1.73m2 10 (L) GFR est AA Latest Ref Range: >60 ml/min/1.73m2 13 (L) Bilirubin, total Latest Ref Range: 0.2 - 1.0 MG/DL 8.3 (H) Albumin Latest Ref Range: 3.4 - 5.0 g/dL 1.9 (L) DIET:  [x] Renal    [] Other     [] NPO     []  Diabetic PRIMARY NURSE REPORT: First initial/Last name/Title Pre Dialysis: BARBARA Rivas RN    Time: 4885 EDUCATION:   
[x] Patient [] Other         Knowledge Basis: []None [x]Minimal [] Substantial  
Barriers to learning  []N/A [x] Access Care     [] S&S of infection     [] Fluid Management     []K+     [x]Procedural   
[]Albumin     [] Medications     [] Tx Options     [] Transplant     [] Diet     [] Other Teaching Tools:  [x] Explain  [] Demo  [] Handouts [] Video Patient response:   [x] Verbalized understanding  [] Teach back  [] Return demonstration [] Requires follow up Inappropriate due to         
 
[x] Time Out/Safety Check  [x]Extracorporeal Circuit Tested for integrity RO/HEMODIALYSIS MACHINE SAFETY CHECKS  Before each treatment:    
Machine Number:                   [facility] 
                                [] Unit Machine #  with centralized RO 
                                [] Portable Machine #1/RO serial # [id number] [] Portable Machine #2/RO serial # [id number] [] Portable Machine #4/RO serial # [id number] [facility] 
                                [] Portable Machine #11/RO serial # [id number] [x] Portable Machine #12/RO serial # [id number] [] Portable Machine #13/RO serial #  [id number] Alarm Test:  Pass time 1018         Other:        
[x] RO/Machine Log Complete Temp    35.5 Dialysate: pH  7.4 Conductivity: Meter   14     HD Machine   14.2                  TCD: 14 
Dialyzer Lot # [id number]            Blood Tubing Lot # [id number]          Saline Lot #  [id number]  
 
CHLORINE TESTING-Before each treatment and every 4 hours Total Chlorine: [x] less than 0.1 ppm  Time: 1310 4 Hr/2nd Check Time: 9392 (if greater than 0.1 ppm from Primary then every 30 minutes from Secondary) TREATMENT INITIATION  with Dialysis Precautions:  
[x] All Connections Secured                 [x] Saline Line Double Clamped  
[x] Venous Parameters Set                  [x] Arterial Parameters Set [x] Prime Given 250 ml                          [x]Air Foam Detector Engaged Treatment Initiation Note:Patient was brought to dialysis suite by hospital transportation personnel. She is stable for her dialysis. Her LUE AVG was cannulated without difficulty, and her treatment was began. During Treatment Notes:Patient tolerating treatment well. No signs or symptoms of distress. Medication Dose Volume Route Initials Dialyzer Cleared: [x] Good [] Fair  [] Poor Blood processed:  68.9 L 
UF Removed  1268 Ml Post Wt:     kg 
POst BP:   112/74       Pulse: 95      Respirations: 18  Temperature:  
  
                              Post Tx Vascular Access: AVF/AVG: Bleeding stopped Art 5 min. Osmany. [address] Catheter: N/A Post Assessment:  
  
                              Skin:  [x] Warm  [] Dry [] Diaphoretic    [] Flushed  [] Pale [] Cyanotic [facility] Signatures Title Initials  Time Lungs: [] Clear    [x] Course  [] Crackles  [] Wheezing [] Diminished Mac Manuel RN SH  Cardiac: [x] Regular   [] Irregular   [] Monitor  [] N/A  Rhythm:      
    Edema:  [x] None    [] General     [] Facial   [] Pedal    [] UE    [] LE  
    Pain: [x]0  []1  []2   []3  []4   []5   []6   []7   []8   []9   []10 Post Treatment Note: 
 
 Patient's blood was returned at the end of her treatment. She tolerated treatment well. Her needles were removed and a gauze and tape were placed on her AVG. She was returned to her room by her nurse.   
 
POST TREATMENT PRIMARY NURSE HANDOFF REPORT:  
 First initial/Last name/Title Post Dialysis: BARBARA Crowell RN Time:  3754 Abbreviations: AVG-arterial venous graft, AVF-arterial venous fistula, IJ-Internal Jugular, Subcl-Subclavian, Fem-Femoral, Tx-treatment, AP/HR-apical heart rate, DFR-dialysate flow rate, BFR-blood flow rate, AP-arterial pressure, -venous pressure, UF-ultrafiltrate, TMP-transmembrane pressure, Osmany-Venous, Art-Arterial, RO-Reverse Osmosis

## 2019-02-20 NOTE — PROGRESS NOTES
Bedside shift change report given to Dionna RN by Lydia Ramírez RN.  Report included the following information SBAR, Kardex, Intake/Output, MAR and Cardiac Rhythm NSR. 
  
1958  Shift assessment, reports pain. PRN Roxicodine given. Family at bedside. 
  
2110  BG 77 - 4 oz of apple juice provided. Pt took a few sips, reports that it is too sweet and will cause her to throw up. Pt was encouraged by daughter to take more sips. Sherbet given per request. 
 
0043  BG recheck - 185.  
 
0430  Pt in bed, pulled out 2 PIVs. 22# PIV inserted. Bed linens, bed pads, gown changed. Bedside report given to Lydia Ramírez, RUPIDNER by me. Pt in bed resting.

## 2019-02-20 NOTE — PROGRESS NOTES
1205: Per chart review, patients H/H is low (6.6/20.4). Will hold PT session and f/u with patient medically appropriate for participation with PT. Cole Bello PT, DPT Office extension: [phone] Pager #: [phone]

## 2019-02-20 NOTE — PROGRESS NOTES
45 yo BF with ESRD and multiple other issues being followed for Cholestatic jaundice. No evidence of biliary obstruction on imaging Studies. T Bili down to. 8.3 
  
Plan: continue supportive care 
          Transjugular liver biopsy when more stable Monitor LFTs Will sign off

## 2019-02-20 NOTE — ROUTINE PROCESS
0700 Bedside, Verbal and Written shift change report given to [address] [place] [address],[address] (oncoming nurse) by Westley Garduno RN (offgoing nurse). Report included the following information SBAR and Kardex. Patient currently resting in bed, eyes open, oriented to self, call bell in reach 
 
0801 sent Dr Viet Webb page to notify of hgb 6.6 
 
1300 Dr Viet Webb on floor, notified of hgb 6.6, pt will be going to dialysis soon, Dr Viet Webb said no transfusion at this time

## 2019-02-20 NOTE — PROGRESS NOTES
Problem: Pressure Injury - Risk of 
Goal: *Prevention of pressure injury Document Honorio Scale and appropriate interventions in the flowsheet. Outcome: Progressing Towards Goal 
Pressure Injury Interventions: 
Sensory Interventions: Avoid rigorous massage over bony prominences, Assess changes in LOC, Keep linens dry and wrinkle-free, Maintain/enhance activity level, Minimize linen layers Moisture Interventions: Absorbent underpads, Maintain skin hydration (lotion/cream), Minimize layers Activity Interventions: Pressure redistribution bed/mattress(bed type) Mobility Interventions: HOB 30 degrees or less, Pressure redistribution bed/mattress (bed type), PT/OT evaluation Nutrition Interventions: Document food/fluid/supplement intake Friction and Shear Interventions: Minimize layers, HOB 30 degrees or less Problem: Falls - Risk of 
Goal: *Absence of Falls Document High Falls Rank Fall Risk and appropriate interventions in the flowsheet. Outcome: Progressing Towards Goal 
Fall Risk Interventions: 
Mobility Interventions: Bed/chair exit alarm, Patient to call before getting OOB Mentation Interventions: Door open when patient unattended, Reorient patient, Room close to nurse's station Medication Interventions: Patient to call before getting OOB, Bed/chair exit alarm Elimination Interventions: Call light in reach, Patient to call for help with toileting needs, Toileting schedule/hourly rounds History of Falls Interventions: Room close to nurse's station

## 2019-02-20 NOTE — PROGRESS NOTES
Pulmonary progress note History 52-year-old woman was admitted to the hospital on 2/8/19 with altered mental status and cholestatic jaundice/possible liver cirrhosis. She was found to have peritonitis, presumed spontaneous bacterial, and a urinary tract infection. She went on to develop septic shock. Her hospital course has been complicated by respiratory failure. The patient self extubated on 2/16/19. She has had renal failure and required dialysis. Her known, presumed nonischemic, cardiomyopathy with LVEF of 21-25% with grade 2 diastolic dysfunction and pulmonary hypertension contributed to fluid overload and persistent hypotension. By description in various notes, the patient's mental status has improved. She was transferred to the medical floor on 2/18/19. She continues to respond slowly, but she answers questions appropriately. She has not seen any change in her shortness of breath or cough after changing the timing of her nebulized therapies. She has not been working with PT or walking Exam 
She is alert. No respiratory distress at rest.  Affect difficult to assess given mental status Blood pressure 98/63, pulse 94, temperature 97.9 °F (36.6 °C), resp. rate 18, height 5' 5\" (1.651 m), weight 60.6 kg (133 lb 11.2 oz), SpO2 94 %. Jaundiced sclera. Gaze conjugate Trachea midline No wheezing or rhonchi. Coarse cough RRR Soft No edema, cyanosis or clubbing Labs 2/20/19: WBC 5.7, hemoglobin 6.6 and platelets 83. INR is 1.4. BUN is 19 and creatinine is 4.50. Albumin is 1.9. Last chest x-ray was on 2/16/19. Assessment Acute respiratory failure tolerating extubation. No suggestion of CO2 retention on ABGs. As needed use of outpatient pulmonary therapies Severe cardiomyopathy Acute renal failure Altered mental status Anemia of unclear etiology, suspect acute blood loss due to Fe 47% Plan Duo nebs every 6 hours. Change to RT qid As needed albuterol nebs Evaluation of anemia per primary or other consultants.

## 2019-02-20 NOTE — PROGRESS NOTES
Internal Medicine Progress Note Patient's Name: Nemo Schaefer Admit Date: 2/8/2019 Length of Stay: 12 
 
 
Assessment/Plan Principal Problem: 
  SBP (spontaneous bacterial peritonitis) ([address] (2/12/2019) Active Problems: Metabolic encephalopathy (0/7/6105) UTI (urinary tract infection) (2/8/2019) Cirrhosis ([address] (2/8/2019) ESRD (end stage renal disease) on dialysis ([address] (2/8/2019) COPD (chronic obstructive pulmonary disease) ([address] (2/8/2019) Hyperbilirubinemia (2/8/2019) Combined systolic and diastolic heart failure ([address] (2/10/2019) Anemia, chronic disease (2/15/2019) Coagulopathy ([address] (2/15/2019) Shock ([address] (2/15/2019) Thrombocytopenia ([address] (2/16/2019) Severe protein-calorie malnutrition ([address] (2/19/2019) SBP 
-cont rocephin-10day course completed, continue day to day 
- paracentesis done 2/11, cell count profile consistent with bacterial peritonitis 
  
ESRD on dialysis 
- nephro consult - appreciate assistance 
- HD on 2/9, 2/13, 2/18, 2/20 
- hold dialysis on 2/12 d/t HOTN - midodrine added 
- dialysis done 2/13, albumin given, no fluid pulled off 
-transfused one unit 2/20 
  
Cholestatic jaundice 
- CT abd results below - GI consult - appreciate assistance - serologies, paracentesis to eval fluid, start lactulose/rifaximin, vit K x3 days - MRCP w/o contrast, transjugular liver bx ordered - MRCP unable to be done - ERCP done 2/13 - results below, GI recs medical management of decompensated chronic liver disease with cholestasis -Transjugular liver biopsy when mores table 
 -T bili 14.8 
  
UTI 
- iv rocephin x5 days - UCx - negative 
  
Metabolic encephalopathy 
- Head CT - no acute abnormality 
- monitor neuro status - worsened on 2/13 likely secondary to anesthesia - w/d to pain 
  
HF 
- cardio consult - appreciate assistance 
  
COPD 
- brovana, pulmicort 
-Duoneb RT, QID 
  
Acute Resp failure 
 -stable NC Shock -Levophed D/C  
-BP stable - Cont acceptable home medications for chronic conditions  
- DVT protocol I have personally reviewed all pertinent labs and films that have officially resulted over the last 24 hours. I have personally checked for all pending labs that are awaiting final results. Interval History Lorena Loaiza a 46 y.o. female with a PMHx ESRD on dialysis, DM, CAD, COPD, syst/diastolic heart failure, HTN, chronic liver disease who presented to the ED from dialysis due to AMS. History limited 2/2 AMS. ED eval revealed scleral icterus, confusion, leukocytosis, UTI. Hyperbilirubinemia (21.7) and elevated alk phos (571) are similar to recent ED visit at [facility] 2/1. Review of prior records from [place] revealed she was supposed to have a percutaneous gallbladder drain placed on 2/4/19. Head CT neg. Pt will be admitted for further evaluation. IV rocephin started. Nephrology consulted - HD on 2/9 as patient missed the prior day. CT abd without contrast ordered, results below. GI consulted - serologies, paracentesis to eval fluid, start lactulose/rifaximin, vit K x3 days, cardio consult. UCx negative. PT recommending SNF. Paracentesis performed 2/11 - removed 100cc dark jed fluid, cell count profile consistent with SBP. Pt already on rocephin. Fluid cx ngtd. Midodrine added for HOTN. MRCP w/o contrast and transjugular liver bx ordered per GI. MRCP unable to be done 2/2 patient not cooperating. ERCP done on 2/13 instead, no CBD stone or stricture seen. Patient transferred to ICU afterwards 2/2 HOTN and need for pressors instead of IVF due to ESRD. ICU team concerned about AMS and pt's ability to protect airway so patient was intubated.  Pt's daughter informed of recent events, wishes for patient to remain full code.   Dialysis 2/15.  SBT, patient self extubated.  Remains on pressors but requiring less.  Passed swallow eval. Pressors D/C, BP stabilizing.  Dialysis 2/18, 2/20. Transjuglar liver biopsy when stable. EGD duodenal ulcers, no CBD stone. Pt Hgb 6.6, transfused one unit. Subjective Pt s/e @ bedside. No major events overnight. Sitting in bed, alert, eating and tolerating diet Objective Visit Vitals /70 Pulse 92 Temp 97.5 °F (36.4 °C) Resp 18 Ht 5' 5\" (1.651 m) Wt 60.6 kg (133 lb 11.2 oz) SpO2 98% BMI 22.25 kg/m² Physical Exam: 
General Appearance: NAD, Icteric Sclerae, HENT: normocephalic/atraumatic, moist mucus membranes Neck: No JVD, supple Lungs: CTA with normal respiratory effort CV: RRR, no m/r/g Abdomen: soft, non-tender, normal bowel sounds Extremities: no cyanosis, trace edema Neuro: No focal deficits, motor/sensory intact Skin: Normal color, intact Lymphatics: No cervical or supraclavicular lymphadenopathy Psych: alert, A/o x2 Intake and Output: 
Current Shift:  02/20 0701 - 02/20 1900 In: 120 [P.O.:120] Out: - Last three shifts:  02/18 1901 - 02/20 0700 In: 300 [P.O.:300] Out: -  
 
Lab/Data Reviewed: All lab results for the last 24 hours reviewed. Imaging Reviewed: 
No results found. Medications Reviewed: 
Current Facility-Administered Medications Medication Dose Route Frequency  albuterol-ipratropium (DUO-NEB) 2.5 MG-0.5 MG/3 ML  3 mL Nebulization QID RT  
 0.9% sodium chloride infusion 250 mL  250 mL IntraVENous PRN  
 albuterol (PROVENTIL VENTOLIN) nebulizer solution 2.5 mg  2.5 mg Nebulization Q4H PRN  pantoprazole (PROTONIX) tablet 40 mg  40 mg Oral ACB  cefTRIAXone (ROCEPHIN) 1 g in 0.9% sodium chloride (MBP/ADV) 50 mL MBP  1 g IntraVENous Q24H  
 midodrine (PROAMITINE) tablet 7.5 mg  7.5 mg Oral Q8H  
 oxyCODONE IR (ROXICODONE) tablet 5 mg  5 mg Oral Q6H PRN  
 insulin lispro (HUMALOG) injection   SubCUTAneous AC&HS  
 glucose chewable tablet 16 g  4 Tab Oral PRN  
 glucagon (GLUCAGEN) injection 1 mg  1 mg IntraMUSCular PRN  
  dextrose (D50) infusion 12.5-25 g  25-50 mL IntraVENous PRN  
 epoetin briseida (EPOGEN;PROCRIT) injection 10,000 Units  10,000 Units IntraVENous DIALYSIS MON, WED & FRI  rifAXIMin (XIFAXAN) tablet 550 mg  550 mg Oral BID  lactulose (CHRONULAC) solution 10 g  15 mL Oral TID  B complex-vitamin C-folic acid (NEPHRO-NACHO) 0.8 mg tab  1 Tab Oral DAILY Tesha Gaston PA-C [address] [place], [place] [address] Pager: [phone] Office: [phone]

## 2019-02-20 NOTE — PROGRESS NOTES
Chart reviewed. Plan is SNF. Pt has accepting facilities at [facility], [facility] and [address] [place] [address].

## 2019-02-20 NOTE — PROGRESS NOTES
Renal Progress Note Follow up of ESRD Assessment/Plan: 1. ESRD. Dialysis today and continue mwf. 2. Hypotension due to sbp/liver failure/sepsis. Continue midodrin. 3. Resp failure. Stable after self extubation. 4. Anemia of ckd. Continue greg with dialysis. May need transfusions. 5. Secondary hyperparathyroidism of ckd. Will resume sensipar when oral intake is back to normal.  
6. Liver cirrhosis/ascites/sbp/jaundice. On ceftriaxone. GI on the case. EGD results noted- duodenal ulcers, no cbd stone. 8. Systolic chf.  
9. Altered ms. Improving. Subjective: 
Looks, feels better. Sitting in bed, eating. Alert, ox2. Tolerates diet, no cp/sob/n/v. Patient Active Problem List  
Diagnosis Code  Metabolic encephalopathy Q13.45  
 UTI (urinary tract infection) N39.0  Cirrhosis ([facility] [facility] K74.60  ESRD (end stage renal disease) on dialysis ([facility]) N18.6, Z99.2  COPD (chronic obstructive pulmonary disease) ([facility]) J44.9  Hyperbilirubinemia E80.6  Combined systolic and diastolic heart failure ([facility]) I50.40  SBP (spontaneous bacterial peritonitis) ([facility] K65.2  Anemia, chronic disease D63.8  Coagulopathy ([facility] [address] [facility] D68.9  Shock ([facility] [facility] R57.9  Thrombocytopenia ([facility] [address] [facility] D69.6  Severe protein-calorie malnutrition ([facility] [address] [facility] E43 Current Facility-Administered Medications Medication Dose Route Frequency Provider Last Rate Last Dose  albuterol-ipratropium (DUO-NEB) 2.5 MG-0.5 MG/3 ML  3 mL Nebulization Q6H RT Divine Herman MD   3 mL at 02/20/19 3391  albuterol (PROVENTIL VENTOLIN) nebulizer solution 2.5 mg  2.5 mg Nebulization Q4H PRN Divine Herman MD      
 pantoprazole (PROTONIX) tablet 40 mg  40 mg Oral ACB Yanely Crowder MD   40 mg at 02/20/19 4280  cefTRIAXone (ROCEPHIN) 1 g in 0.9% sodium chloride (MBP/ADV) 50 mL MBP  1 g IntraVENous Q24H Darylene Economy, PA-C 100 mL/hr at 02/19/19 1316 1 g at 02/19/19 1316  midodrine (PROAMITINE) tablet 7.5 mg  7.5 mg Oral Q8H Yao Reis MD   7.5 mg at 02/20/19 0510  
 oxyCODONE IR (ROXICODONE) tablet 5 mg  5 mg Oral Q6H PRN Yao Reis MD   5 mg at 02/20/19 1787  insulin lispro (HUMALOG) injection   SubCUTAneous AC&HS Noman Asencio MD   Stopped at 02/19/19 2200  
 glucose chewable tablet 16 g  4 Tab Oral PRN Amber Chatterjee, NP      
 glucagon (GLUCAGEN) injection 1 mg  1 mg IntraMUSCular PRN Cassandra LAKE NP      
 dextrose (D50) infusion 12.5-25 g  25-50 mL IntraVENous PRN Cassandra LAKE NP      
 epoetin briseida (EPOGEN;PROCRIT) injection 10,000 Units  10,000 Units IntraVENous DIALYSIS KITA WOODSON & Ramírez Blount MD   10,000 Units at 02/18/19 2224  rifAXIMin (XIFAXAN) tablet 550 mg  550 mg Oral BID Meron Rivera MD   550 mg at 02/20/19 1411  lactulose (CHRONULAC) solution 10 g  15 mL Oral TID Meron Rivera MD   10 g at 02/20/19 0908  B complex-vitamin C-folic acid (NEPHRO-NACHO) 0.8 mg tab  1 Tab Oral DAILY Enrique Grider MD   1 Tab at 02/20/19 6238 Objective: 
Vitals:  
 02/20/19 0106 02/20/19 0430 02/20/19 8315 02/20/19 6512 BP:  103/71  98/63 Pulse:  97  94 Resp:  18  18 Temp:  97.9 °F (36.6 °C)  97.9 °F (36.6 °C) TempSrc:      
SpO2: 98% 97% 99% 94% Weight:  60.6 kg (133 lb 11.2 oz) Height:      
 
. Intake/Output Summary (Last 24 hours) at 2/20/2019 1219 Last data filed at 2/20/2019 9381 Gross per 24 hour Intake 180 ml Output  Net 180 ml Admission weight:Weight: 61.2 kg (135 lb) (02/08/19 0706) Last Weight Metrics: 
Weight Loss Metrics 2/20/2019 10/30/2018 Today's Wt 133 lb 11.2 oz 139 lb BMI 22.25 kg/m2 23.13 kg/m2 Physical Exam:  
 
General: Nad. Sclerae icteric. Neck: no jvd. LUNGS: good air entry, bl exp rhonchi. CVS EXM: S1, S2, no murmur, rub or gallop. Abdomen: Soft, Non Tender, moderately distended, non tense ascites. Lower Extremities: trace Edema. Access: lt arm avf, good thrill, dressings intact. Labs: CBC w/Diff Recent Labs  
  02/20/19 
0450 02/19/19 
0730 02/18/19 0410 WBC 5.7 5.5 7.2 RBC 2.52* 2.71* 2.54* HGB 6.6* 7.3* 6.6* HCT 20.4* 22.6* 20.3* PLT 83* 64* 55* GRANS 56 65 71 LYMPH 25 17* 14* EOS 3 2 3 Chemistry Recent Labs  
  02/20/19 
0450 02/19/19 
0730 02/18/19 0410 * 139* 114* * 137 136  
K 3.8 3.9 3.8 CL 98* 99* 98* CO2 26 27 26 BUN 19* 16 23* CREA 4.50* 3.76* 4.68* CA 8.9 8.5 8.3* AGAP 11 11 12 BUCR 4* 4* 5* ALB 1.9* 1.9* 1.7* PHOS 3.1 3.1 3.6 Lab Results Component Value Date/Time Iron 67 02/09/2019 05:48 AM  
 TIBC 143 (L) 02/09/2019 05:48 AM  
 Iron % saturation 47 02/09/2019 05:48 AM  
  
Lab Results Component Value Date/Time Calcium 8.9 02/20/2019 04:50 AM  
 CALCIUM,IONIZED 4.50 10/30/2018 11:15 AM  
 Phosphorus 3.1 02/20/2019 04:50 AM  
  
 
 
Randi Noble M.D [facility] Office [phone] Pager [phone]

## 2019-02-21 LAB
ABO + RH BLD: NORMAL
ALBUMIN SERPL-MCNC: 2 G/DL (ref 3.4–5)
ANION GAP SERPL CALC-SCNC: 12 MMOL/L (ref 3–18)
ANION GAP SERPL CALC-SCNC: 9 MMOL/L (ref 3–18)
BASOPHILS # BLD: 0.2 K/UL (ref 0–0.1)
BASOPHILS NFR BLD: 3 % (ref 0–2)
BLD PROD TYP BPU: NORMAL
BLOOD GROUP ANTIBODIES SERPL: NORMAL
BPU ID: NORMAL
BUN SERPL-MCNC: 11 MG/DL (ref 7–18)
BUN SERPL-MCNC: 15 MG/DL (ref 7–18)
BUN/CREAT SERPL: 4 (ref 12–20)
BUN/CREAT SERPL: 4 (ref 12–20)
CALCIUM SERPL-MCNC: 8.7 MG/DL (ref 8.5–10.1)
CALCIUM SERPL-MCNC: 9.5 MG/DL (ref 8.5–10.1)
CALLED TO:,BCALL1: NORMAL
CHLORIDE SERPL-SCNC: 97 MMOL/L (ref 100–108)
CHLORIDE SERPL-SCNC: 99 MMOL/L (ref 100–108)
CO2 SERPL-SCNC: 24 MMOL/L (ref 21–32)
CO2 SERPL-SCNC: 29 MMOL/L (ref 21–32)
CREAT SERPL-MCNC: 2.82 MG/DL (ref 0.6–1.3)
CREAT SERPL-MCNC: 3.46 MG/DL (ref 0.6–1.3)
CROSSMATCH RESULT,%XM: NORMAL
DIFFERENTIAL METHOD BLD: ABNORMAL
EOSINOPHIL # BLD: 0.1 K/UL (ref 0–0.4)
EOSINOPHIL NFR BLD: 2 % (ref 0–5)
ERYTHROCYTE [DISTWIDTH] IN BLOOD BY AUTOMATED COUNT: 24.4 % (ref 11.6–14.5)
GLUCOSE BLD STRIP.AUTO-MCNC: 151 MG/DL (ref 70–110)
GLUCOSE BLD STRIP.AUTO-MCNC: 152 MG/DL (ref 70–110)
GLUCOSE BLD STRIP.AUTO-MCNC: 220 MG/DL (ref 70–110)
GLUCOSE BLD STRIP.AUTO-MCNC: 96 MG/DL (ref 70–110)
GLUCOSE SERPL-MCNC: 138 MG/DL (ref 74–99)
GLUCOSE SERPL-MCNC: 164 MG/DL (ref 74–99)
HCT VFR BLD AUTO: 24.6 % (ref 35–45)
HGB BLD-MCNC: 8 G/DL (ref 12–16)
INR PPP: 1.5 (ref 0.8–1.2)
LYMPHOCYTES # BLD: 1.4 K/UL (ref 0.9–3.6)
LYMPHOCYTES NFR BLD: 21 % (ref 21–52)
MCH RBC QN AUTO: 27.7 PG (ref 24–34)
MCHC RBC AUTO-ENTMCNC: 32.5 G/DL (ref 31–37)
MCV RBC AUTO: 85.1 FL (ref 74–97)
MONOCYTES # BLD: 0.6 K/UL (ref 0.05–1.2)
MONOCYTES NFR BLD: 10 % (ref 3–10)
NEUTS SEG # BLD: 4.4 K/UL (ref 1.8–8)
NEUTS SEG NFR BLD: 64 % (ref 40–73)
PHOSPHATE SERPL-MCNC: 2.7 MG/DL (ref 2.5–4.9)
PLATELET # BLD AUTO: 95 K/UL (ref 135–420)
POTASSIUM SERPL-SCNC: 3.9 MMOL/L (ref 3.5–5.5)
POTASSIUM SERPL-SCNC: 4.6 MMOL/L (ref 3.5–5.5)
PROTHROMBIN TIME: 17.6 SEC (ref 11.5–15.2)
RBC # BLD AUTO: 2.89 M/UL (ref 4.2–5.3)
SODIUM SERPL-SCNC: 133 MMOL/L (ref 136–145)
SODIUM SERPL-SCNC: 137 MMOL/L (ref 136–145)
SPECIMEN EXP DATE BLD: NORMAL
STATUS OF UNIT,%ST: NORMAL
UNIT DIVISION, %UDIV: 0
WBC # BLD AUTO: 6.7 K/UL (ref 4.6–13.2)

## 2019-02-21 PROCEDURE — 74011636637 HC RX REV CODE- 636/637: Performed by: FAMILY MEDICINE

## 2019-02-21 PROCEDURE — 74011250637 HC RX REV CODE- 250/637: Performed by: HOSPITALIST

## 2019-02-21 PROCEDURE — 97110 THERAPEUTIC EXERCISES: CPT

## 2019-02-21 PROCEDURE — 36415 COLL VENOUS BLD VENIPUNCTURE: CPT

## 2019-02-21 PROCEDURE — 94760 N-INVAS EAR/PLS OXIMETRY 1: CPT

## 2019-02-21 PROCEDURE — 85025 COMPLETE CBC W/AUTO DIFF WBC: CPT

## 2019-02-21 PROCEDURE — 82962 GLUCOSE BLOOD TEST: CPT

## 2019-02-21 PROCEDURE — 74011250637 HC RX REV CODE- 250/637: Performed by: INTERNAL MEDICINE

## 2019-02-21 PROCEDURE — 65660000000 HC RM CCU STEPDOWN

## 2019-02-21 PROCEDURE — 85610 PROTHROMBIN TIME: CPT

## 2019-02-21 PROCEDURE — 94640 AIRWAY INHALATION TREATMENT: CPT

## 2019-02-21 PROCEDURE — 74011000258 HC RX REV CODE- 258: Performed by: PHYSICIAN ASSISTANT

## 2019-02-21 PROCEDURE — 80048 BASIC METABOLIC PNL TOTAL CA: CPT

## 2019-02-21 PROCEDURE — 80069 RENAL FUNCTION PANEL: CPT

## 2019-02-21 PROCEDURE — 74011000250 HC RX REV CODE- 250: Performed by: INTERNAL MEDICINE

## 2019-02-21 PROCEDURE — 77030037878 HC DRSG MEPILEX >48IN BORD MOLN -B

## 2019-02-21 PROCEDURE — 77010033678 HC OXYGEN DAILY

## 2019-02-21 PROCEDURE — 74011250636 HC RX REV CODE- 250/636: Performed by: PHYSICIAN ASSISTANT

## 2019-02-21 PROCEDURE — 97530 THERAPEUTIC ACTIVITIES: CPT

## 2019-02-21 RX ORDER — IPRATROPIUM BROMIDE AND ALBUTEROL SULFATE 2.5; .5 MG/3ML; MG/3ML
3 SOLUTION RESPIRATORY (INHALATION)
Status: DISCONTINUED | OUTPATIENT
Start: 2019-02-21 | End: 2019-02-23 | Stop reason: HOSPADM

## 2019-02-21 RX ADMIN — MIDODRINE HYDROCHLORIDE 7.5 MG: 2.5 TABLET ORAL at 20:45

## 2019-02-21 RX ADMIN — SODIUM CHLORIDE 250 ML: 900 INJECTION, SOLUTION INTRAVENOUS at 12:45

## 2019-02-21 RX ADMIN — LACTULOSE 10 G: 20 SOLUTION ORAL at 10:24

## 2019-02-21 RX ADMIN — MIDODRINE HYDROCHLORIDE 7.5 MG: 2.5 TABLET ORAL at 14:30

## 2019-02-21 RX ADMIN — OXYCODONE HYDROCHLORIDE 5 MG: 5 TABLET ORAL at 20:45

## 2019-02-21 RX ADMIN — INSULIN LISPRO 2 UNITS: 100 INJECTION, SOLUTION INTRAVENOUS; SUBCUTANEOUS at 10:24

## 2019-02-21 RX ADMIN — INSULIN LISPRO 4 UNITS: 100 INJECTION, SOLUTION INTRAVENOUS; SUBCUTANEOUS at 12:48

## 2019-02-21 RX ADMIN — IPRATROPIUM BROMIDE AND ALBUTEROL SULFATE 3 ML: .5; 3 SOLUTION RESPIRATORY (INHALATION) at 08:10

## 2019-02-21 RX ADMIN — LACTULOSE 10 G: 20 SOLUTION ORAL at 20:45

## 2019-02-21 RX ADMIN — CEFTRIAXONE 1 G: 1 INJECTION, POWDER, FOR SOLUTION INTRAMUSCULAR; INTRAVENOUS at 12:49

## 2019-02-21 RX ADMIN — INSULIN LISPRO 2 UNITS: 100 INJECTION, SOLUTION INTRAVENOUS; SUBCUTANEOUS at 22:18

## 2019-02-21 RX ADMIN — RIFAXIMIN 550 MG: 550 TABLET ORAL at 20:38

## 2019-02-21 RX ADMIN — RIFAXIMIN 550 MG: 550 TABLET ORAL at 10:24

## 2019-02-21 RX ADMIN — PANTOPRAZOLE SODIUM 40 MG: 40 TABLET, DELAYED RELEASE ORAL at 07:30

## 2019-02-21 RX ADMIN — LACTULOSE 10 G: 20 SOLUTION ORAL at 16:49

## 2019-02-21 RX ADMIN — MIDODRINE HYDROCHLORIDE 7.5 MG: 2.5 TABLET ORAL at 06:05

## 2019-02-21 RX ADMIN — Medication 1 TABLET: at 10:24

## 2019-02-21 NOTE — PROGRESS NOTES
Spoke with daughter Laim Hansen who states that she would like me to speak with pt regarding her choice for SNF. Pt's niece was present in room when I spoke to pt. Pt states she had worked in several SNF facilities, and would like to go to [address] first choice, [facility] second. This CM then spoke with Evan Beach at [facility]. He confirms he does have bed available, will just need to have first dialysis set up. Pt receives dialysis at [facility] on [address]  on [address]-F. Attempted to call daughter back to let her know pt's choice, and to confirm transportation. No answer. Left message asking her to call me back.

## 2019-02-21 NOTE — PROGRESS NOTES
In Patient Progress note Admit Date: 2/8/2019 Impression: 1. ESRD. Dialysis today and continue mwf. 2. Hypotension due to sbp/liver failure/sepsis. Continue midodrin. 3. Resp failure. Stable after self extubation. 4. Anemia of ckd. Continue greg with dialysis. May need transfusions. 5. Secondary hyperparathyroidism of ckd. Will resume sensipar when oral intake is back to normal.  
6. Liver cirrhosis/ascites/sbp/jaundice. On ceftriaxone. GI on the case. EGD results noted- duodenal ulcers, no cbd stone. 8. Systolic chf.  
9. Altered ms. Improving. Please call with questions Jose Burden MD FASN Cell [phone] Pager: [phone] Subjective:  
 
Sitting comfortably Denies SOB, CP Current Facility-Administered Medications:  
  albuterol-ipratropium (DUO-NEB) 2.5 MG-0.5 MG/3 ML, 3 mL, Nebulization, QID RT, Irene Ozuna MD, 3 mL at 02/21/19 0810 
  0.9% sodium chloride infusion 250 mL, 250 mL, IntraVENous, PRN, Shahbaz Granado PA-C 
  albuterol (PROVENTIL VENTOLIN) nebulizer solution 2.5 mg, 2.5 mg, Nebulization, Q4H PRN, Matt Asher MD 
  pantoprazole (PROTONIX) tablet 40 mg, 40 mg, Oral, ACB, Luis Daniel Chaidez MD, 40 mg at 02/21/19 0730   cefTRIAXone (ROCEPHIN) 1 g in 0.9% sodium chloride (MBP/ADV) 50 mL MBP, 1 g, IntraVENous, Q24H, Shahbaz Granado PA-C, Last Rate: 100 mL/hr at 02/20/19 1247, 1 g at 02/20/19 1247 
  midodrine (PROAMITINE) tablet 7.5 mg, 7.5 mg, Oral, Q8H, Sarah Mcneal MD, 7.5 mg at 02/21/19 3131 
  oxyCODONE IR (ROXICODONE) tablet 5 mg, 5 mg, Oral, Q6H PRN, Sarah Mcneal MD, 5 mg at 02/20/19 9580   insulin lispro (HUMALOG) injection, , SubCUTAneous, AC&HS, Noman Asencio MD, 2 Units at 02/21/19 1024 
  glucose chewable tablet 16 g, 4 Tab, Oral, PRN, Rebecca Shaikh, NP 
  glucagon (GLUCAGEN) injection 1 mg, 1 mg, IntraMUSCular, PRN, Anay Salcedo, NP 
   dextrose (D50) infusion 12.5-25 g, 25-50 mL, IntraVENous, PRN, Rebecca Shaikh NP 
  epoetin briseida (EPOGEN;PROCRIT) injection 10,000 Units, 10,000 Units, IntraVENous, DIALYSIS MON, WED & Jules Garcia MD, 10,000 Units at 02/18/19 2224   rifAXIMin (XIFAXAN) tablet 550 mg, 550 mg, Oral, BID, Lynn Barrett MD, 550 mg at 02/21/19 1024 
  lactulose (CHRONULAC) solution 10 g, 15 mL, Oral, TID, Raina QUEZADA MD, 10 g at 02/21/19 1024   B complex-vitamin C-folic acid (NEPHRO-NACHO) 0.8 mg tab, 1 Tab, Oral, DAILY, Pepe Zuluaga MD, 1 Tab at 02/21/19 1024 Objective:  
 
Visit Vitals /61 (BP 1 Location: Right arm, BP Patient Position: At rest) Pulse 100 Temp 98.8 °F (37.1 °C) Resp 16 Ht 5' 5\" (1.651 m) Wt 60.6 kg (133 lb 11.2 oz) SpO2 99% BMI 22.25 kg/m² Intake/Output Summary (Last 24 hours) at 2/21/2019 1120 Last data filed at 2/20/2019 2256 Gross per 24 hour Intake 410 ml Output 1268 ml Net -858 ml Physical Exam:  
 
gen NAD HENT mmm RS AEBE clear CVS s1s2 wnl no JVD 
GI soft BS + Ext no edema Data Review: 
 
Recent Labs  
  02/21/19 
0345 WBC 6.7  
RBC 2.89* HCT 24.6* MCV 85.1 MCH 27.7 MCHC 32.5  
RDW 24.4* Recent Labs  
  02/21/19 
0345 02/20/19 
0450 02/19/19 
0730 BUN 11 19* 16  
CREA 2.82* 4.50* 3.76* CA 8.7 8.9 8.5 ALB 2.0* 1.9* 1.9*  
K 3.9 3.8 3.9  135* 137 CL 99* 98* 99* CO2 29 26 27 PHOS 2.7 3.1 3.1 * 126* 139* Kingston Tyrell, MD

## 2019-02-21 NOTE — ROUTINE PROCESS
1945: Assumed care. Awake. HOB elevated. NO sob on RA. Blood transfusion on going at 125 ml/hr. Infusing well to Right FA gauge #22. Call light within reach. 2025: Paged Dr Bro Grijalva. Informed that patient went to dialysis today. Supposed to get EPOGEN IV. Missed the dose. Asked if it can be given SC. Replied NO & find out the reason why it was not given. 2030:Consent for blood transfusion obtained from the patient. Signed & witnessed. Blood transfusion ended. No untoward reaction noted. 2048: Called dialysis unit. Spoke to VAZQUEZ. Andrew Ricci did the dialysis & off already. Paged Nephrologist on call. Dr. Sharlene Eugene called back. Informed of the situation. Order received to give Epogen SC tonight. 2200: Due meds given. . Coverage provided per protocol. Assisted patient to bathroom. Family at bedside. 2330: Resting quietly in bed. 8094: Patient accidentally pulled out PIV line. Post BT Hgb count is 9.0. 
 
0120: Placed PIV line to right wrist gauge #22. Incontinence care provided. Sacral mepilex dressing soiled. Replaced. 0510: No change from previous assessment. 7268: Slept good thru night. Needs attended. Due mes given. 0805:Bedside and Verbal shift change report given to Jenny. [address] (oncoming nurse) by me (offgoing nurse). Report included the following information SBAR, Kardex, Intake/Output, MAR and Recent Results.

## 2019-02-21 NOTE — PROGRESS NOTES
Problem: Pressure Injury - Risk of 
Goal: *Prevention of pressure injury Document Honorio Scale and appropriate interventions in the flowsheet. Outcome: Progressing Towards Goal 
Pressure Injury Interventions: 
Sensory Interventions: Discuss PT/OT consult with provider Moisture Interventions: Absorbent underpads Activity Interventions: Pressure redistribution bed/mattress(bed type) Mobility Interventions: HOB 30 degrees or less Nutrition Interventions: Document food/fluid/supplement intake Friction and Shear Interventions: Apply protective barrier, creams and emollients Problem: Falls - Risk of 
Goal: *Absence of Falls Document Trevor Macleod Fall Risk and appropriate interventions in the flowsheet. Outcome: Progressing Towards Goal 
Fall Risk Interventions: 
Mobility Interventions: Communicate number of staff needed for ambulation/transfer, Patient to call before getting OOB Mentation Interventions: Adequate sleep, hydration, pain control, Bed/chair exit alarm, Door open when patient unattended, Family/sitter at bedside Medication Interventions: Bed/chair exit alarm, Evaluate medications/consider consulting pharmacy, Patient to call before getting OOB Elimination Interventions: Call light in reach, Patient to call for help with toileting needs History of Falls Interventions: Bed/chair exit alarm, Door open when patient unattended

## 2019-02-21 NOTE — PROGRESS NOTES
NUTRITION follow-up/Plan of care RECOMMENDATIONS:  
 
1. Renal; Pureed diet; 1200 ml FR 
2. Ensure daily 3. Monitor weight, labs and PO intake 4. RD to follow GOALS:  
 
1. Ongoing: PO intake meets >75% of protein/calorie needs by 2/26 2. Ongoing: Maintain weight (+/- 1-2 lb) by 2/26 ASSESSMENT:  
 
Weight status is classified as normal per BMI of 22.9. However, history of poor PO intake. Weights have been variable this admission. PO intake is fair. Added Ensure daily for additional calories/protein. Labs noted. Renal parameters improved. BUN wnl; Creatinine elevated but trending down; GFR: 22. Patient with hypoalbuminemia. Nutrition recommendations listed. RD to follow. 
  
Meets Criteria for Chronic Malnutrition  
[x] Severe Malnutrition, as evidenced by: 
            [x] Severe muscle wasting, loss of subcutaneous fat 
            [x] Nutritional intake of <75% of recommended intake for >1 month 
            [x] Weight loss of  >5% in 1 month, >7.5% in 3 months, >10% in 6 months, >20% in 1 year 
            [] Severe edema  
   
  
Nutrition Risk:  []   High [x]  Moderate [] Low SUBJECTIVE/OBJECTIVE:  
 
Pt admitted for UTI/metabolic encephalopathy. Pt w/ ESRD on HD. Pt w/ jaundice eyes and elevated hepatic panel. Noted per documented weight records Pt w/ weight loss equating to 18 lb or 13% x ~3 months. Pt appears thin with noted muscle wasting and SQ fat loss of extremities. Per diet/weight history obtained by RD on 2/10/19 (prior to intubation) pt reported poor appetite for the past 2 months and minimal PO intake. 2/14/19:  Now intubated. 2/15/19:  TF on hold - OGT to suction. 2/18/19:  PtsSelf extubated 2/16/19; currently receiving po diet;  needs feeding assistance per RN. 
2/21: Transferred from ICU. Reviewed SLP recommendations for pureed diet and thin liquids.  Patient with stage 2 pressure ulcer to gluteal fold per nursing documentation. Observed 30% intake of lunch tray. Patient reported not liking Nepro supplements and prefers chocolate Ensure. Will change from Nepro to Ensure daily as renal labs have improved. Implemented food preferences for optimal PO intake. Encouraged adequate intake to meet nutrition needs. Will monitor. Information Obtained From:  
[x] Chart Review [x] Patient 
[] Family/Caregiver 
[] Nurse/Physician  
[] Patient Rounds/Interdisciplinary Meeting Diet: Renal; Pureed diet; 1200 ml FR Patient Vitals for the past 100 hrs: 
 % Diet Eaten 02/20/19 1428 50 % 02/19/19 1402 25 % 02/19/19 0956 25 % 02/18/19 0842 0 %  
02/17/19 1600 25 % Medications: [x] Reviewed (Nephro-briseida, Lactulose) Encounter Diagnoses ICD-10-CM ICD-9-CM 1. Urinary tract infection without hematuria, site unspecified N39.0 599.0 2. Hyperbilirubinemia E80.6 782.4 3. Confusion R41.0 298.9 4. Hemodialysis patient ([facility] Z99.2 V45.11 Past Medical History:  
Diagnosis Date  CAD (coronary artery disease) RCA TAMMY (2015)  Cardiomyopathy ([facility] [address] [facility]  CHF (congestive heart failure) ([facility] [address] [facility]  Chronic obstructive pulmonary disease ([facility] [address] [facility]  Diabetes ([facility] [address] [facility]  ESRD on dialysis ([facility] [address] [facility]  HLD (hyperlipidemia)  Hypertension  Liver disease  S/P heart valve repair Labs:   
Lab Results Component Value Date/Time Sodium 137 02/21/2019 03:45 AM  
 Potassium 3.9 02/21/2019 03:45 AM  
 Chloride 99 (L) 02/21/2019 03:45 AM  
 CO2 29 02/21/2019 03:45 AM  
 Anion gap 9 02/21/2019 03:45 AM  
 Glucose 138 (H) 02/21/2019 03:45 AM  
 BUN 11 02/21/2019 03:45 AM  
 Creatinine 2.82 (H) 02/21/2019 03:45 AM  
 Calcium 8.7 02/21/2019 03:45 AM  
 Phosphorus 2.7 02/21/2019 03:45 AM  
 Albumin 2.0 (L) 02/21/2019 03:45 AM  
 
Anthropometrics: BMI (calculated): 22.2 Last 3 Recorded Weights in this Encounter 02/18/19 0600 02/19/19 0525 02/20/19 9264 Weight: 54 kg (119 lb 0.8 oz) 54.2 kg (119 lb 7.8 oz) 60.6 kg (133 lb 11.2 oz) Ht Readings from Last 1 Encounters:  
02/11/19 5' 5\" (1.651 m) Weight Metrics 2/21/2019 10/30/2018 Weight 137 lb 12.8 oz 139 lb BMI 22.93 kg/m2 23.13 kg/m2 []  Weight Loss 
[x]  Weight Gain (??) 
[]  Weight Stable  
[]  New wt n/a on record Estimated Nutrition Needs:  
1700 Kcals/day(1346-1065 kcal) Protein (g): 75 g(66-77g) Nutrition Problems Identified:  
[x] Suboptimal PO intake  
[] Food Allergies [x] Difficulty chewing/swallowing/poor dentition 
[] Constipation/Diarrhea  
[] Nausea/Vomiting  
[] None 
[x] Other: Wound healing Plan:  
[x] Therapeutic Diet [x]  Obtained/adjusted food preferences/tolerances and/or snacks options [x]  Supplements added  
[x]  SLP therapy following for feeding techniques []  HS snack added  
[x]  Modify diet texture  
[]  Modify diet for food allergies []  Assist with menu selection  
[x]  Monitor PO intake on meal rounds  
[x]  Continue inpatient monitoring and intervention  
[]  Participated in discharge planning/Interdisciplinary rounds/Team meetings  
[]  Other:  
 
Education Needs: 
 [] Not appropriate for teaching at this time due to: 
 [x] Identified and addressed Nutrition Monitoring and Evaluation: 
 [x] Continue inpatient monitoring and interventions [] Other:  
 
Cj Benitez

## 2019-02-21 NOTE — PROGRESS NOTES
Pt has accepting facilities. Phone pt daughter Liam Hansen to determine selection for SNF. Left message asking her to return my call

## 2019-02-21 NOTE — PROGRESS NOTES
Problem: Falls - Risk of 
Goal: *Absence of Falls Document Darlene Gay Fall Risk and appropriate interventions in the flowsheet. Outcome: Progressing Towards Goal 
Fall Risk Interventions: 
Mobility Interventions: Communicate number of staff needed for ambulation/transfer, Patient to call before getting OOB, PT Consult for mobility concerns, Utilize walker, cane, or other assistive device Mentation Interventions: Adequate sleep, hydration, pain control, Bed/chair exit alarm, Door open when patient unattended Medication Interventions: Bed/chair exit alarm, Patient to call before getting OOB Elimination Interventions: Call light in reach, Toilet paper/wipes in reach, Patient to call for help with toileting needs History of Falls Interventions: Bed/chair exit alarm, Door open when patient unattended

## 2019-02-21 NOTE — PROGRESS NOTES
Problem: Self Care Deficits Care Plan (Adult) Goal: *Acute Goals and Plan of Care (Insert Text) Occupational Therapy Goals Initiated 2/19/2019 within 7 day(s). Will implement 3 day trial to determine pt's ability to actively participate with therapy. Continue per POC if appropriate. 1.  Patient will perform grooming tasks at EOB with supervision and minimal verbal cues for attention to task. 2.  Patient will perform upper body dressing with minimal assistance and fair+ dynamic sitting balance. 3.  Patient will perform functional task in standing for 8 minutes with minimal assistance and < 4 rest breaks in prep for functional transfers & ADLs. 4.  Patient will perform toilet transfers with minimal assistance. 5.  Patient will perform all aspects of toileting with minimal assistance. 6.  Patient will participate in upper extremity therapeutic exercise/activities for 8 minutes with supervision/set-up to increase BUE strength for functional transfers and ADLs. 7.  Patient will utilize energy conservation techniques during functional activities with minimal verbal cues. Outcome: Progressing Towards Goal 
[facility] 
OCCUPATIONAL THERAPY: Daily Note INPATIENT: Medicare: Hospital Day: 14 Patient: Veda Killian (65 y.o. female)    Date: 2/21/2019 Primary Diagnosis: UTI (urinary tract infection) [N39.0] Metabolic encephalopathy [J98.68] Procedure(s) (LRB): ENDOSCOPIC ULTRASOUND (EUS) (N/A), 8 Days Post-Op, Precautions: Falls PLOF: Pt was independent with basic self care tasks and functional mobility PTA. ASSESSMENT : Pt up in chair on arrival, agreeable to therapy. C/o sore BLEs. Agreeable to therapy. Max A x1 to stand with rollator to reposition secondary to pt reporting discomfort on tailbone area. Pt reporting relief after repositioning.  Pt participated in BUE TherEx 10 each (shoulder flex, elbow flex/ext, scapular depression) with minimal verbal cues & activity pacing to increase strength for improved functional transfers and independence in ADLs. Pt deferred further tx; left up in chair with needs within reach. BLEs elevated on rollator. Recommend SNF upon d/c. PLAN : 
Patient continues to benefit from skilled intervention to address the above impairments. Continue treatment per established plan of care. EDUCATION:  
Education:  Patient was educated on the following topics:  
Progression toward goals: 
[x]          Improving appropriately and progressing toward goals 
[]          Improving slowly and progressing toward goals 
[]          Not making progress toward goals and plan of care will be adjusted Barriers to Learning/Limitations: None Compensate with: visual, verbal, tactile, kinesthetic cues/model Discharge Recommendations: Santhosh Flores Further Equipment Recommendations for Discharge: shower chair SUBJECTIVE:  
Patient stated: \"I'll do some exercises; sure. \" OBJECTIVE/TREATMENT:  
 
Past Medical History:  
Diagnosis Date  CAD (coronary artery disease) RCA TAMMY (2015)  Cardiomyopathy ([facility] [address]  CHF (congestive heart failure) ([facility] [address]  Chronic obstructive pulmonary disease ([facility] [address]  Diabetes ([facility]  ESRD on dialysis ([facility] [facility]  HLD (hyperlipidemia)  Hypertension  Liver disease  S/P heart valve repair Past Surgical History:  
Procedure Laterality Date  CARDIAC SURG PROCEDURE UNLIST  AR GI ENDOSCOPIC ULTRASOUND  2/13/2019 Functional Status Indep (I) Mod I Stand-by Assist  
Contact Guard Min Assist  
Mod Assist  
Max assist  
Total Assist  
Comments Rolling []  []  []  []    []    []    []  []  N/t; up in chair Supine to sit []  []  []  []  []  []  []  []  N/t Sit to supine []  []  []  []  []  []  []  [] Sit to stand []  []  []  []  []  []  [x]  [] Stand to sit []  []  []  []  [x]  []  []  [] Bed to chair transfers []  []  []  []  []  []  []  [] Balance Good Kerney Slight Poor Unable Comments Sitting static [x]  []  []  [] Sitting dynamic []  [x]  []  []    
Standing static []  [x]  []  []  Fair-  
Standing dynamic []  []  []  [x] Reaction Time []  []  []  [] Therapeutic Exercise:  
AROM, Scap. depression, SHLD flexion, Elbow flexion and elbow extension x10 with rest breaks and minimal verbal cues for positioning Pain:  
Pre treatment:  0/10 Post treatment:  0/10 Scale: numeric Activity tolerance:  
fair COMMUNICATION/EDUCATION:  
Education:  Patient was educated on the following topics: role of OT and continued POC Treatment/Session Assessment: · After treatment position/precautions:  
o Up in chair 
o Call light within reach 
o RN notified Recommendations/Intent for next treatment session: \"Next visit will focus on advancements to more challenging activities\". Thank you for this referral. 
Dominique Bone MS OTR/L Time Calculation: 18 mins

## 2019-02-21 NOTE — PROGRESS NOTES
1630 - again attempted to reach pt daughter regarding discharge to SNF. Left message informing her that pt will be discharged tomorrow, please get in touch with this CM in am to arrange transportation and discuss facility choice.

## 2019-02-21 NOTE — PROGRESS NOTES
Internal Medicine Progress Note Patient's Name: Camryn Finley Admit Date: 2/8/2019 Length of Stay: 13 
 
 
Assessment/Plan Principal Problem: 
  SBP (spontaneous bacterial peritonitis) ([address] (2/12/2019) Active Problems: Metabolic encephalopathy (2/0/5214) UTI (urinary tract infection) (2/8/2019) Cirrhosis ([address] (2/8/2019) ESRD (end stage renal disease) on dialysis ([address] (2/8/2019) COPD (chronic obstructive pulmonary disease) ([address] (2/8/2019) Hyperbilirubinemia (2/8/2019) Combined systolic and diastolic heart failure ([address] (2/10/2019) Anemia, chronic disease (2/15/2019) Coagulopathy ([address] (2/15/2019) Shock ([address] (2/15/2019) Thrombocytopenia ([address] (2/16/2019) Severe protein-calorie malnutrition ([address] (2/19/2019) SBP 
-cont rocephin-10day course completed, continue day to day 
- paracentesis done 2/11, cell count profile consistent with bacterial peritonitis 
  
ESRD on dialysis 
- nephro consult - appreciate assistance 
- HD on 2/9, 2/13, 2/18, 2/20 
- hold dialysis on 2/12 d/t HOTN - midodrine added 
- dialysis done 2/13, albumin given, no fluid pulled off 
-transfused one unit 2/20 
 
  
Cholestatic jaundice 
- CT abd results below - GI consult - appreciate assistance - serologies, paracentesis to eval fluid, start lactulose/rifaximin, vit K x3 days - MRCP w/o contrast, transjugular liver bx ordered - MRCP unable to be done - ERCP done 2/13 - results below, GI recs medical management of decompensated chronic liver disease with cholestasis -Transjugular liver biopsy when more stable 
 -T bili 14.8 
  
UTI 
- iv rocephin x5 days - UCx - negative 
  
Metabolic encephalopathy 
- Head CT - no acute abnormality 
- monitor neuro status - worsened on 2/13 likely secondary to anesthesia - w/d to pain 
  
HF 
- cardio consult - appreciate assistance 
  
COPD 
- brovana, pulmicort 
-Duoneb RT, QID 
  
Acute Resp failure 
 -stable NC Shock -Levophed D/C  
-BP stable - Cont acceptable home medications for chronic conditions  
- DVT protocol I have personally reviewed all pertinent labs and films that have officially resulted over the last 24 hours. I have personally checked for all pending labs that are awaiting final results. Interval History Tobe Koyanagi a 46 y.o. female with a PMHx ESRD on dialysis, DM, CAD, COPD, syst/diastolic heart failure, HTN, chronic liver disease who presented to the ED from dialysis due to AMS. History limited 2/2 AMS. ED eval revealed scleral icterus, confusion, leukocytosis, UTI. Hyperbilirubinemia (21.7) and elevated alk phos (571) are similar to recent ED visit at [facility] 2/1. Review of prior records from [facility] revealed she was supposed to have a percutaneous gallbladder drain placed on 2/4/19. Head CT neg. Pt will be admitted for further evaluation. IV rocephin started. Nephrology consulted - HD on 2/9 as patient missed the prior day. CT abd without contrast ordered, results below. GI consulted - serologies, paracentesis to eval fluid, start lactulose/rifaximin, vit K x3 days, cardio consult. UCx negative. PT recommending SNF. Paracentesis performed 2/11 - removed 100cc dark jed fluid, cell count profile consistent with SBP. Pt already on rocephin. Fluid cx ngtd. Midodrine added for HOTN. MRCP w/o contrast and transjugular liver bx ordered per GI. MRCP unable to be done 2/2 patient not cooperating. ERCP done on 2/13 instead, no CBD stone or stricture seen. Patient transferred to ICU afterwards 2/2 HOTN and need for pressors instead of IVF due to ESRD. ICU team concerned about AMS and pt's ability to protect airway so patient was intubated.  Pt's daughter informed of recent events, wishes for patient to remain full code.   Dialysis 2/15.  SBT, patient self extubated.  Remains on pressors but requiring less.  Passed swallow eval. Pressors D/C, BP stabilizing.  Dialysis 2/18, 2/20.  Transjuglar liver biopsy when stable.  EGD duodenal ulcers, no CBD stone. Pt Hgb 6.6, transfused one unit, rechecked, and stable. SNF placement 2/21, will continue HD outpatient, patient appears to have returned to her baseline Subjective Pt s/e @ bedside. No major events overnight. Pt offers no complaints this AM.Denies CP or SOB. Denies abd pain. Objective Visit Vitals /75 (BP 1 Location: Right arm, BP Patient Position: At rest) Pulse 97 Temp 98.3 °F (36.8 °C) Resp 16 Ht 5' 5\" (1.651 m) Wt 62.5 kg (137 lb 12.8 oz) SpO2 99% BMI 22.93 kg/m² Physical Exam: 
General Appearance: NAD, icteric sclerae HENT: normocephalic/atraumatic, moist mucus membranes Neck: No JVD, supple Lungs: CTA with normal respiratory effort CV: RRR, no m/r/g Abdomen: soft, non-tender, normal bowel sounds Extremities: no cyanosis, trace edema Neuro: No focal deficits, motor/sensory intact Skin: Normal color, intact Lymphatics: No cervical or supraclavicular lymphadenopathy Psych: A/o x2 Intake and Output: 
Current Shift:  No intake/output data recorded. Last three shifts:  02/19 1901 - 02/21 0700 In: 18 [P.O.:280; I.V.:100] Out: 1268 Lab/Data Reviewed: All lab results for the last 24 hours reviewed. Imaging Reviewed: 
No results found. Medications Reviewed: 
Current Facility-Administered Medications Medication Dose Route Frequency  albuterol-ipratropium (DUO-NEB) 2.5 MG-0.5 MG/3 ML  3 mL Nebulization Q4H PRN  
 0.9% sodium chloride infusion 250 mL  250 mL IntraVENous PRN  
 albuterol (PROVENTIL VENTOLIN) nebulizer solution 2.5 mg  2.5 mg Nebulization Q4H PRN  pantoprazole (PROTONIX) tablet 40 mg  40 mg Oral ACB  cefTRIAXone (ROCEPHIN) 1 g in 0.9% sodium chloride (MBP/ADV) 50 mL MBP  1 g IntraVENous Q24H  
 midodrine (PROAMITINE) tablet 7.5 mg  7.5 mg Oral Q8H  
 oxyCODONE IR (ROXICODONE) tablet 5 mg  5 mg Oral Q6H PRN  
  insulin lispro (HUMALOG) injection   SubCUTAneous AC&HS  
 glucose chewable tablet 16 g  4 Tab Oral PRN  
 glucagon (GLUCAGEN) injection 1 mg  1 mg IntraMUSCular PRN  
 dextrose (D50) infusion 12.5-25 g  25-50 mL IntraVENous PRN  
 epoetin briseida (EPOGEN;PROCRIT) injection 10,000 Units  10,000 Units IntraVENous DIALYSIS MON, WED & FRI  rifAXIMin (XIFAXAN) tablet 550 mg  550 mg Oral BID  lactulose (CHRONULAC) solution 10 g  15 mL Oral TID  B complex-vitamin C-folic acid (NEPHRO-NACHO) 0.8 mg tab  1 Tab Oral DAILY Juan Nice PA-C 7 [facility] Pager: [phone] Office: [phone]

## 2019-02-21 NOTE — PROGRESS NOTES
Problem: Mobility Impaired (Adult and Pediatric) Goal: *Acute Goals and Plan of Care (Insert Text) Physical Therapy Goals Re-evaluated 2/17/2019; Updated goals and to be accomplished within 7 day(s) 1. Patient will move from supine to sit and sit to supine  in bed with modified independence. 2.  Patient will transfer from bed to chair and chair to bed with modified independence using the least restrictive device. 3.  Patient will perform sit to stand with modified independence. 4.  Patient will ambulate with modified independence for 50 feet with the least restrictive device. Initiated 2/11/2019 and to be accomplished within 3 day trial.  
If appropriate continue per plan of care to accomplish goals within 7 days. 1.  Patient will maintain eyes open for greater than 90% of session to allow for active participation in mobility training. 2.  Patient will follow 90% of commands to maximize safety and active participation in mobility training. 3.  Patient will move from supine to sit and sit to supine in bed with supervision/set-up. 4.  Patient will maintain seated at edge of bed for 8 min with supervision/set-up to prepare for out of bed activity. 5.  Patient will perform sit to stand with minimal assistance/contact guard assist. 
6.  Patient will transfer from bed to chair and chair to bed with minimal assistance/contact guard assist using the least restrictive device. 7.  Patient will ambulate with minimal assistance/contact guard assist for 10 feet with the least restrictive device. Outcome: Progressing Towards Goal 
 
PHYSICAL THERAPY: Daily TREATMENT Note INPATIENT: Medicare: Hospital Day: 14 Patient: Russel Choudhury (67 y.o. female)    Date: 2/21/2019 Primary Diagnosis: UTI (urinary tract infection) [N39.0] Metabolic encephalopathy [L48.32] Procedure(s) (LRB): ENDOSCOPIC ULTRASOUND (EUS) (N/A), 8 Days Post-Op, Precautions: Fall, Skin Chart, physical therapy assessment, plan of care and goals were reviewed. PLOF:with rollator ASSESSMENT: 
Pt pleasant, somewhat impulsive requiring frequent verbal and tactile cues for safety and attention to task. Pt CGA for sit<>stand, gait X6 ft with rollator to chair; decreased attention to safety with stand>sit. Educated pt on use of call bell, requesting assist for mobility, nursing notified. Pt with nasal cannula, however O2 off and line not connected; sats 99%. Progression toward goals: 
    [x]  Improving appropriately and progressing toward goals 
    []  Improving slowly and progressing toward goals 
    [] Not making progress toward goals and plan of care will be adjusted PLAN: 
Patient continues to benefit from skilled intervention to address the above impairments. Continue treatment per established plan of care. EDUCATION:  
Education:  Patient was educated on the following topics: safety Barriers to Learning/Limitations: None Compensate with: visual, verbal, tactile, kinesthetic cues/model Discharge Recommendations:  [facility] Further Equipment Recommendations for Discharge:  N/A Factors which may impact discharge planning: none SUBJECTIVE:  
Patient stated I'm not going to [place].  OBJECTIVE DATA SUMMARY:  
Critical Behavior: 
Neurologic State: Alert Orientation Level: Oriented X4 Cognition: Appropriate decision making, Appropriate for age attention/concentration, Appropriate safety awareness, Follows commands Safety/Judgement: Decreased insight into deficits Functional Mobility: 
 
 
Functional Status Indep (I) Mod I Super-vision Min A Mod A Max A Total A Assist x2 Verbal cues Additional time Not tested Comments Rolling []  [x]  [] []    []    []  []  [] [] [] [] Supine to sit []  [x]  [] []  []  []  []  [] [] [] [] Sit to supine []  []  [] []  []  []  []  [] [] [] [x] Sit to stand []  []  [] [x]  []  []  []  [] [] [] []   
Stand to sit []  []  [] [x]  []  []  []  [] [] [] [] Bed to chair transfers []  []  [x]CGA []  []  []  []  [] [] [] [] Balance Good Judythe Lars Poor Unable Not tested Comments Sitting static [x]  []  []  []  [] Sitting dynamic [x]  []  []  []  []   
Standing static []  [x]  []  []  []   
Standing dynamic []  [x]  []  []  [] Mobility/Gait:  
Level of Assistance: Contact guard assistance Assistive Device: rollator Distance Ambulated: 6 feet Base of Support: center of gravity altered Speed/Tasha: accelerated [facility] Swing Pattern: [facility] Stance: [facility] Gait Abnormalities: [facility] Stairs:  
Level of Assistance: Not assessed at this time Vital Signs Temp: 98.8 °F (37.1 °C) Pulse (Heart Rate): 100 BP: 111/61 Resp Rate: 16    
O2 Sat (%): 99 %Pain:Pre treatment pain level:0 Post treatment pain level:0Pain Scale 1: Numeric (0 - 10) Pain Intensity 1: 0 Activity Tolerance:  
Good After treatment:  
[x]  Patient left in no apparent distress sitting up in chair 
[]  Patient left in no apparent distress in bed 
[x]  Call bell left within reach [x]  Nursing notified 
[]  Caregiver present 
[]  Bed alarm activated Suellen Scmhidt PTA Time Calculation: 22 mins

## 2019-02-21 NOTE — PROGRESS NOTES
0700  -- Bedside, Verbal and Written shift change report given to [id number] (oncoming nurse) by Clarisse Schwab nurse). Report included the following information SBAR, Kardex, Intake/Output, MAR and Recent Results.  
   
1020 -- AM medications administered, pt tolerated with ease, will continue to monitor. 
  
1222 -- Shift reassessment, pt condition unchanged, will continue to monitor. 1300 -- Pt moved from chair back to bed for lunch and administration of IV medication. Mepilex on sacral was soiled. Pt was cleaned and new dressing applied with assist from [facility]. 
   
1630 --  Shift reassessment, pt condition unchanged, will continue to monitor.   
   
 1900 -- Bedside, Verbal and Written shift change report given to CAM  (oncoming nurse) by FÁTIMA (offgoing nurse). Report included the following information SBAR, Kardex, Intake/Output, MAR and Recent Results. Skin assessment completed.

## 2019-02-21 NOTE — PROGRESS NOTES
Problem: Pressure Injury - Risk of 
Goal: *Prevention of pressure injury Document Honorio Scale and appropriate interventions in the flowsheet. Outcome: Progressing Towards Goal 
Pressure Injury Interventions: 
Sensory Interventions: Avoid rigorous massage over bony prominences, Discuss PT/OT consult with provider, Keep linens dry and wrinkle-free, Pressure redistribution bed/mattress (bed type) Moisture Interventions: Absorbent underpads, Maintain skin hydration (lotion/cream) Activity Interventions: Pressure redistribution bed/mattress(bed type), PT/OT evaluation Mobility Interventions: HOB 30 degrees or less, Pressure redistribution bed/mattress (bed type) Nutrition Interventions: Document food/fluid/supplement intake Friction and Shear Interventions: Foam dressings/transparent film/skin sealants, HOB 30 degrees or less

## 2019-02-22 PROBLEM — G93.41 METABOLIC ENCEPHALOPATHY: Status: RESOLVED | Noted: 2019-02-08 | Resolved: 2019-02-22

## 2019-02-22 PROBLEM — K65.2 SBP (SPONTANEOUS BACTERIAL PERITONITIS) (HCC): Status: RESOLVED | Noted: 2019-02-12 | Resolved: 2019-02-22

## 2019-02-22 PROBLEM — N39.0 UTI (URINARY TRACT INFECTION): Status: RESOLVED | Noted: 2019-02-08 | Resolved: 2019-02-22

## 2019-02-22 LAB
ALBUMIN SERPL-MCNC: 2.1 G/DL (ref 3.4–5)
ANION GAP SERPL CALC-SCNC: 12 MMOL/L (ref 3–18)
BASOPHILS # BLD: 0.2 K/UL (ref 0–0.1)
BASOPHILS NFR BLD: 2 % (ref 0–2)
BUN SERPL-MCNC: 16 MG/DL (ref 7–18)
BUN/CREAT SERPL: 4 (ref 12–20)
CALCIUM SERPL-MCNC: 9.3 MG/DL (ref 8.5–10.1)
CHLORIDE SERPL-SCNC: 97 MMOL/L (ref 100–108)
CO2 SERPL-SCNC: 26 MMOL/L (ref 21–32)
CREAT SERPL-MCNC: 3.79 MG/DL (ref 0.6–1.3)
DIFFERENTIAL METHOD BLD: ABNORMAL
EOSINOPHIL # BLD: 0.2 K/UL (ref 0–0.4)
EOSINOPHIL NFR BLD: 2 % (ref 0–5)
ERYTHROCYTE [DISTWIDTH] IN BLOOD BY AUTOMATED COUNT: 25.4 % (ref 11.6–14.5)
GLUCOSE BLD STRIP.AUTO-MCNC: 148 MG/DL (ref 70–110)
GLUCOSE BLD STRIP.AUTO-MCNC: 172 MG/DL (ref 70–110)
GLUCOSE BLD STRIP.AUTO-MCNC: 243 MG/DL (ref 70–110)
GLUCOSE BLD STRIP.AUTO-MCNC: 268 MG/DL (ref 70–110)
GLUCOSE SERPL-MCNC: 114 MG/DL (ref 74–99)
HCT VFR BLD AUTO: 26.6 % (ref 35–45)
HGB BLD-MCNC: 8.6 G/DL (ref 12–16)
INR PPP: 1.4 (ref 0.8–1.2)
LYMPHOCYTES # BLD: 2 K/UL (ref 0.9–3.6)
LYMPHOCYTES NFR BLD: 25 % (ref 21–52)
MCH RBC QN AUTO: 27.6 PG (ref 24–34)
MCHC RBC AUTO-ENTMCNC: 32.3 G/DL (ref 31–37)
MCV RBC AUTO: 85.3 FL (ref 74–97)
MONOCYTES # BLD: 1.2 K/UL (ref 0.05–1.2)
MONOCYTES NFR BLD: 14 % (ref 3–10)
NEUTS SEG # BLD: 4.6 K/UL (ref 1.8–8)
NEUTS SEG NFR BLD: 57 % (ref 40–73)
PHOSPHATE SERPL-MCNC: 2.7 MG/DL (ref 2.5–4.9)
PLATELET # BLD AUTO: 112 K/UL (ref 135–420)
POTASSIUM SERPL-SCNC: 4.1 MMOL/L (ref 3.5–5.5)
PROTHROMBIN TIME: 17.1 SEC (ref 11.5–15.2)
RBC # BLD AUTO: 3.12 M/UL (ref 4.2–5.3)
SODIUM SERPL-SCNC: 135 MMOL/L (ref 136–145)
WBC # BLD AUTO: 8.2 K/UL (ref 4.6–13.2)

## 2019-02-22 PROCEDURE — 77030037878 HC DRSG MEPILEX >48IN BORD MOLN -B

## 2019-02-22 PROCEDURE — 90935 HEMODIALYSIS ONE EVALUATION: CPT

## 2019-02-22 PROCEDURE — 77010033678 HC OXYGEN DAILY

## 2019-02-22 PROCEDURE — 82962 GLUCOSE BLOOD TEST: CPT

## 2019-02-22 PROCEDURE — 74011250636 HC RX REV CODE- 250/636: Performed by: PHYSICIAN ASSISTANT

## 2019-02-22 PROCEDURE — 74011250637 HC RX REV CODE- 250/637: Performed by: INTERNAL MEDICINE

## 2019-02-22 PROCEDURE — 94760 N-INVAS EAR/PLS OXIMETRY 1: CPT

## 2019-02-22 PROCEDURE — 74011250637 HC RX REV CODE- 250/637: Performed by: HOSPITALIST

## 2019-02-22 PROCEDURE — 85025 COMPLETE CBC W/AUTO DIFF WBC: CPT

## 2019-02-22 PROCEDURE — 74011000258 HC RX REV CODE- 258: Performed by: PHYSICIAN ASSISTANT

## 2019-02-22 PROCEDURE — 77030018836 HC SOL IRR NACL ICUM -A

## 2019-02-22 PROCEDURE — 74011250637 HC RX REV CODE- 250/637: Performed by: PHYSICIAN ASSISTANT

## 2019-02-22 PROCEDURE — 85610 PROTHROMBIN TIME: CPT

## 2019-02-22 PROCEDURE — 65660000000 HC RM CCU STEPDOWN

## 2019-02-22 PROCEDURE — 74011636637 HC RX REV CODE- 636/637: Performed by: FAMILY MEDICINE

## 2019-02-22 PROCEDURE — 80069 RENAL FUNCTION PANEL: CPT

## 2019-02-22 PROCEDURE — 36415 COLL VENOUS BLD VENIPUNCTURE: CPT

## 2019-02-22 PROCEDURE — 97535 SELF CARE MNGMENT TRAINING: CPT

## 2019-02-22 PROCEDURE — 97530 THERAPEUTIC ACTIVITIES: CPT

## 2019-02-22 PROCEDURE — 74011250636 HC RX REV CODE- 250/636: Performed by: INTERNAL MEDICINE

## 2019-02-22 RX ORDER — OXYCODONE HYDROCHLORIDE 5 MG/1
2.5 TABLET ORAL
Status: DISCONTINUED | OUTPATIENT
Start: 2019-02-22 | End: 2019-02-23 | Stop reason: HOSPADM

## 2019-02-22 RX ORDER — GABAPENTIN 100 MG/1
100 CAPSULE ORAL 3 TIMES DAILY
Status: DISCONTINUED | OUTPATIENT
Start: 2019-02-22 | End: 2019-02-23 | Stop reason: HOSPADM

## 2019-02-22 RX ADMIN — LACTULOSE 10 G: 20 SOLUTION ORAL at 09:08

## 2019-02-22 RX ADMIN — OXYCODONE HYDROCHLORIDE 2.5 MG: 5 TABLET ORAL at 17:13

## 2019-02-22 RX ADMIN — GABAPENTIN 100 MG: 100 CAPSULE ORAL at 23:55

## 2019-02-22 RX ADMIN — ERYTHROPOIETIN 10000 UNITS: 10000 INJECTION, SOLUTION INTRAVENOUS; SUBCUTANEOUS at 22:49

## 2019-02-22 RX ADMIN — LACTULOSE 10 G: 20 SOLUTION ORAL at 23:55

## 2019-02-22 RX ADMIN — INSULIN LISPRO 2 UNITS: 100 INJECTION, SOLUTION INTRAVENOUS; SUBCUTANEOUS at 09:18

## 2019-02-22 RX ADMIN — MIDODRINE HYDROCHLORIDE 7.5 MG: 2.5 TABLET ORAL at 14:00

## 2019-02-22 RX ADMIN — RIFAXIMIN 550 MG: 550 TABLET ORAL at 09:09

## 2019-02-22 RX ADMIN — PANTOPRAZOLE SODIUM 40 MG: 40 TABLET, DELAYED RELEASE ORAL at 07:30

## 2019-02-22 RX ADMIN — Medication 1 TABLET: at 09:08

## 2019-02-22 RX ADMIN — CEFTRIAXONE 1 G: 1 INJECTION, POWDER, FOR SOLUTION INTRAMUSCULAR; INTRAVENOUS at 12:31

## 2019-02-22 RX ADMIN — OXYCODONE HYDROCHLORIDE 5 MG: 5 TABLET ORAL at 03:40

## 2019-02-22 RX ADMIN — SODIUM CHLORIDE 250 ML: 900 INJECTION, SOLUTION INTRAVENOUS at 12:31

## 2019-02-22 RX ADMIN — RIFAXIMIN 550 MG: 550 TABLET ORAL at 23:55

## 2019-02-22 RX ADMIN — OXYCODONE HYDROCHLORIDE 2.5 MG: 5 TABLET ORAL at 20:52

## 2019-02-22 RX ADMIN — MIDODRINE HYDROCHLORIDE 7.5 MG: 2.5 TABLET ORAL at 05:46

## 2019-02-22 RX ADMIN — INSULIN LISPRO 6 UNITS: 100 INJECTION, SOLUTION INTRAVENOUS; SUBCUTANEOUS at 17:14

## 2019-02-22 RX ADMIN — GABAPENTIN 100 MG: 100 CAPSULE ORAL at 14:37

## 2019-02-22 RX ADMIN — OXYCODONE HYDROCHLORIDE 5 MG: 5 TABLET ORAL at 12:30

## 2019-02-22 NOTE — PROGRESS NOTES
Went into PT room to change her after being told she spilled her coffee on herself and she told me \" I don't want to be changed right now. I'm eating. I'm not wet. Don't worry about it right now. \" 
  
Advised RUPINDER Carmen right after.

## 2019-02-22 NOTE — PROGRESS NOTES
[facility] has accepted pt. This CM called daughter Jj Dickey to let her know. Pt eileen Huber states he will  pt at discharge. He also states he will give pt ride to dialysis on Monday. Confirmed pt dialysis chair times are for M,W,F at 5:50am.  Family states that pt has scheduled  with [facility] for her dialysis. This CM also spoke with [facility] to let them know dialysis arrangements. Will notify facility in [facility] when they can expect pt arrival.  Pt is going to room 408.

## 2019-02-22 NOTE — PROGRESS NOTES
Problem: Pressure Injury - Risk of 
Goal: *Prevention of pressure injury Document Honorio Scale and appropriate interventions in the flowsheet. Outcome: Progressing Towards Goal 
Pressure Injury Interventions: 
Sensory Interventions: Assess changes in LOC, Float heels, Keep linens dry and wrinkle-free, Maintain/enhance activity level, Minimize linen layers, Pad between skin to skin, Pressure redistribution bed/mattress (bed type), Turn and reposition approx. every two hours (pillows and wedges if needed) Moisture Interventions: Absorbent underpads, Apply protective barrier, creams and emollients, Moisture barrier, Minimize layers, Maintain skin hydration (lotion/cream) Activity Interventions: Pressure redistribution bed/mattress(bed type), Increase time out of bed, PT/OT evaluation Mobility Interventions: HOB 30 degrees or less, Pressure redistribution bed/mattress (bed type), PT/OT evaluation, Turn and reposition approx. every two hours(pillow and wedges) Nutrition Interventions: Document food/fluid/supplement intake Friction and Shear Interventions: Apply protective barrier, creams and emollients, HOB 30 degrees or less, Lift sheet, Minimize layers Problem: Falls - Risk of 
Goal: *Absence of Falls Document Destiny Shows Fall Risk and appropriate interventions in the flowsheet. Outcome: Progressing Towards Goal 
Fall Risk Interventions: 
Mobility Interventions: Communicate number of staff needed for ambulation/transfer, Bed/chair exit alarm, Patient to call before getting OOB, Utilize walker, cane, or other assistive device Mentation Interventions: Adequate sleep, hydration, pain control, Bed/chair exit alarm, Door open when patient unattended, Increase mobility, More frequent rounding, Update white board Medication Interventions: Bed/chair exit alarm, Teach patient to arise slowly, Patient to call before getting OOB Elimination Interventions: Call light in reach, Bed/chair exit alarm, Patient to call for help with toileting needs, Toileting schedule/hourly rounds History of Falls Interventions: Bed/chair exit alarm, Door open when patient unattended Problem: Pain Goal: *Control of Pain Outcome: Progressing Towards Goal 
Med x2 during this shift ( 2/21/19 7Pm- 2/22/19 07AM) with prn Roxicodone 5 mg po

## 2019-02-22 NOTE — PROGRESS NOTES
Problem: Pressure Injury - Risk of 
Goal: *Prevention of pressure injury Document Honorio Scale and appropriate interventions in the flowsheet. Outcome: Progressing Towards Goal 
Pressure Injury Interventions: 
Sensory Interventions: Assess changes in LOC Moisture Interventions: Absorbent underpads Activity Interventions: Pressure redistribution bed/mattress(bed type) Mobility Interventions: HOB 30 degrees or less Nutrition Interventions: Document food/fluid/supplement intake Friction and Shear Interventions: Apply protective barrier, creams and emollients, HOB 30 degrees or less, Lift sheet, Minimize layers Problem: Falls - Risk of 
Goal: *Absence of Falls Document Marii Perez Fall Risk and appropriate interventions in the flowsheet. Outcome: Progressing Towards Goal 
Fall Risk Interventions: 
Mobility Interventions: Communicate number of staff needed for ambulation/transfer, Patient to call before getting OOB, Utilize walker, cane, or other assistive device, PT Consult for mobility concerns, PT Consult for assist device competence Mentation Interventions: Adequate sleep, hydration, pain control, Bed/chair exit alarm, Door open when patient unattended, Increase mobility, More frequent rounding, Update white board Medication Interventions: Evaluate medications/consider consulting pharmacy, Patient to call before getting OOB, Teach patient to arise slowly Elimination Interventions: Call light in reach, Patient to call for help with toileting needs, Toileting schedule/hourly rounds History of Falls Interventions: Door open when patient unattended, Room close to nurse's station, Evaluate medications/consider consulting pharmacy

## 2019-02-22 NOTE — PROGRESS NOTES
Received message this morning that pt family now wants pt to go to [facility] for SNF. Placed call to [facility] and asked that she look at pt and let me know asap whether they can take as pt is to discharge today.

## 2019-02-22 NOTE — PROGRESS NOTES
Problem: Self Care Deficits Care Plan (Adult) Goal: *Acute Goals and Plan of Care (Insert Text) Occupational Therapy Goals Initiated 2/19/2019 within 7 day(s). Will implement 3 day trial to determine pt's ability to actively participate with therapy. Continue per POC if appropriate. 1.  Patient will perform grooming tasks at EOB with supervision and minimal verbal cues for attention to task. 2.  Patient will perform upper body dressing with minimal assistance and fair+ dynamic sitting balance. 3.  Patient will perform functional task in standing for 8 minutes with minimal assistance and < 4 rest breaks in prep for functional transfers & ADLs. 4.  Patient will perform toilet transfers with minimal assistance. 5.  Patient will perform all aspects of toileting with minimal assistance. 6.  Patient will participate in upper extremity therapeutic exercise/activities for 8 minutes with supervision/set-up to increase BUE strength for functional transfers and ADLs. 7.  Patient will utilize energy conservation techniques during functional activities with minimal verbal cues. Outcome: Progressing Towards Goal 
Occupational Therapy TREATMENT Patient: Breezy Hutchinson (83 y.o. female) Date: 2/22/2019 Diagnosis: UTI (urinary tract infection) [N39.0] Metabolic encephalopathy [P84.17] SBP (spontaneous bacterial peritonitis) ([facility] [address] Procedure(s) (LRB): ENDOSCOPIC ULTRASOUND (EUS) (N/A) 9 Days Post-Op Precautions: Fall, Skin Chart, occupational therapy assessment, plan of care, and goals were reviewed. PLOF: Pt reports independent ASSESSMENT: 
Pt seen at bed level for LB dressing task and ADL grooming tasks 2/2 c/o LLE pain 10/10 w/minimal touch/movement. Pt requires increase time and assist using adaptive equipment, sock aid, 2/2 edematous LLE. Pt may benefit from large sock aid. Pt requires increase time and set-up w/oral care. Pt is very  talkative and requires moderate vcs' to attend to task. EDUCATION Pt educated on energy conservation techniques w/ADLs and use of adaptive equipment w/LB dressing ADLs Progression toward goals: 
[]          Improving appropriately and progressing toward goals [x]          Improving slowly and progressing toward goals 
[]          Not making progress toward goals and plan of care will be adjusted PLAN: 
Patient continues to benefit from skilled intervention to address the above impairments. Continue treatment per established plan of care. Discharge Recommendations:  [facility] Further Equipment Recommendations for Discharge:  wheelchair SUBJECTIVE:  
Patient stated I'm not ready to go.  reference rehab OBJECTIVE DATA SUMMARY: 
  
Cognitive/Behavioral Status: 
Neurologic State: Alert Orientation Level: Oriented X4 Cognition: Appropriate for age attention/concentration Safety/Judgement: Decreased insight into deficits Functional Mobility and Transfers for ADLs: 
ADL Intervention: 
Grooming Brushing Teeth: Stand-by assistance Lower Body Dressing Assistance Socks: Moderate assistance Leg Crossed Method Used: No 
Position Performed: Supine(w/HOB raised) Cues: Henry Luna Adaptive Equipment Used: Sock aid Pain: 
Pre Treatment: 
Post Treatment: 
Pain Scale 1: Numeric (0 - 10) Pain Intensity 1: 10 
Pain Location 1: Leg 
Pain Orientation 1: Left Pain Description 1: Throbbing Pain Intervention(s) 1: Rest 
 
Activity Tolerance:   
Poor Please refer to the flowsheet for vital signs taken during this treatment. After treatment:  
[]  Patient left in no apparent distress sitting up in chair 
[x]  Patient left in no apparent distress in bed 
[x]  Call bell left within reach 
[]  Nursing notified 
[]  Caregiver present 
[]  Bed alarm activated Paula Rae Time Calculation: 40 mins

## 2019-02-22 NOTE — PROGRESS NOTES
completed follow up visit with and Spiritual assessment of patient and offered Pastoral care, see flow sheets for interventions. Chaplains will continue to follow and will provide pastoral care on an as needed/requested basis Blank 3 Board Certified [employer] Spiritual Care  
[phone]

## 2019-02-22 NOTE — PROGRESS NOTES
0700  -- Bedside, Verbal and Written shift change report given to [address] (oncoming nurse) by Annabella Scott nurse). Report included the following information SBAR, Kardex, Intake/Output, MAR and Recent Results. Pt given bath, wound care completed, mepilex changed and linen changed. 
   
0905 -- AM medications administered, pt tolerated with ease, will continue to monitor. 
  
1215 -- Shift reassessment, pt condition unchanged, will continue to monitor. PRN pain medication administered, will continue to monitor. 
   
1630 --  Shift reassessment, pt condition unchanged, will continue to monitor. 1645 -- Pt requested MD be contacted regarding increase in pain medication. PA at bedside. New medication order approved and administered.  
   
  -- Bedside, Verbal and Written shift change report given to   (oncoming nurse) by FÁTIMA (offgoing nurse). Report included the following information SBAR, Kardex, Intake/Output, MAR and Recent Results. Skin assessment completed.

## 2019-02-22 NOTE — PROGRESS NOTES
Renal Progress Note Follow up of ESRD Assessment/Plan: 1. ESRD. Dialysis today and continue mwf. Attempt to pull more fluid to address volume overload. 2. Inter/intradialytic hypotension. Continue midodrin. 3. Anemia of ckd. Continue greg with dialysis. May need transfusions. 4. Secondary hyperparathyroidism of ckd. Will resume sensipar when oral intake is back to normal.  
5. Liver cirrhosis/ascites/sbp/jaundice. On ceftriaxone. GI on the case. EGD results noted- duodenal ulcers, no cbd stone. 6. Systolic chf.  
7. Altered ms. Improving. Subjective: 
Looks, feels better. Working with pt in the hallway. Alert, ox. .  Tolerates diet, no cp/sob/n/v. C/o chronic lbp and bl le edema/pain. Patient Active Problem List  
Diagnosis Code  Metabolic encephalopathy B91.50  
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  SBP (spontaneous bacterial peritonitis) ([facility] [address] K65.2  Anemia, chronic disease D63.8  Coagulopathy ([address] D68.9  Shock ([address] R57.9  Thrombocytopenia ([facility] [address] D69.6  Severe protein-calorie malnutrition ([facility] [address] E43 Current Facility-Administered Medications Medication Dose Route Frequency Provider Last Rate Last Dose  albuterol-ipratropium (DUO-NEB) 2.5 MG-0.5 MG/3 ML  3 mL Nebulization Q4H PRN Marla Woodruff MD      
 0.9% sodium chloride infusion 250 mL  250 mL IntraVENous PRN Lillie Sesay PA-C   Stopped at 02/21/19 1443  albuterol (PROVENTIL VENTOLIN) nebulizer solution 2.5 mg  2.5 mg Nebulization Q4H PRN Hortencia Barber MD      
 pantoprazole (PROTONIX) tablet 40 mg  40 mg Oral ACB Marla Woodruff MD   40 mg at 02/22/19 0730  cefTRIAXone (ROCEPHIN) 1 g in 0.9% sodium chloride (MBP/ADV) 50 mL MBP 1 g IntraVENous Q24H Ebenezer Dempsey PA-C 100 mL/hr at 02/21/19 1249 1 g at 02/21/19 1249  
 midodrine (PROAMITINE) tablet 7.5 mg  7.5 mg Oral Q8H Paige Hernandez MD   7.5 mg at 02/22/19 3695  oxyCODONE IR (ROXICODONE) tablet 5 mg  5 mg Oral Q6H PRN Paige Hernandez MD   5 mg at 02/22/19 7903  insulin lispro (HUMALOG) injection   SubCUTAneous AC&HS Noman Asencio MD   2 Units at 02/22/19 0918  
 glucose chewable tablet 16 g  4 Tab Oral PRN Colette Carey NP      
 glucagon (GLUCAGEN) injection 1 mg  1 mg IntraMUSCular PRN Feliz LAKE NP      
 dextrose (D50) infusion 12.5-25 g  25-50 mL IntraVENous PRN Colette Carey NP      
 epoetin briseida (EPOGEN;PROCRIT) injection 10,000 Units  10,000 Units IntraVENous DIALYSIS KITA WOODSON & Fly Alfaro MD   10,000 Units at 02/18/19 2224  rifAXIMin (XIFAXAN) tablet 550 mg  550 mg Oral BID Sailaja Pabon MD   550 mg at 02/22/19 0893  lactulose (CHRONULAC) solution 10 g  15 mL Oral TID Sailaja Pabon MD   10 g at 02/22/19 9468  B complex-vitamin C-folic acid (NEPHRO-NACHO) 0.8 mg tab  1 Tab Oral DAILY Charla Saha MD   1 Tab at 02/22/19 3301 Objective: 
Vitals:  
 02/21/19 2112 02/22/19 0008 02/22/19 6842 02/22/19 2748 BP: 123/77 105/66 110/75 114/67 Pulse: (!) 108 94 91 95 Resp: 18 17 16 16 Temp: 98.6 °F (37 °C) 98.5 °F (36.9 °C) 98.6 °F (37 °C) 98.8 °F (37.1 °C) TempSrc:      
SpO2: 100% 96% 99% 100% Weight:      
Height:      
 
. Intake/Output Summary (Last 24 hours) at 2/22/2019 1026 Last data filed at 2/22/2019 0377 Gross per 24 hour Intake 720 ml Output  Net 720 ml Admission weight:Weight: 61.2 kg (135 lb) (02/08/19 0706) Last Weight Metrics: 
Weight Loss Metrics 2/21/2019 10/30/2018 Today's Wt 137 lb 12.8 oz 139 lb BMI 22.93 kg/m2 23.13 kg/m2 Physical Exam:  
 
General: Nad. Sclerae icteric. Neck: no jvd. LUNGS: good air entry, bl exp rhonchi. CVS EXM: S1, S2, no murmur, rub or gallop. Abdomen: Soft, Non Tender, moderately distended, non tense ascites. Lower Extremities: 2+ Edema. Access: lt arm avf, good thrill, dressings intact. Labs: CBC w/Diff Recent Labs  
  02/22/19 
0425 02/21/19 
0345 02/20/19 
2247 02/20/19 
0450 WBC 8.2 6.7  --  5.7  
RBC 3.12* 2.89*  --  2.52* HGB 8.6* 8.0* 9.0* 6.6* HCT 26.6* 24.6* 27.9* 20.4* * 95*  --  83* GRANS 57 64  --  56  
LYMPH 25 21  --  25  
EOS 2 2  --  3 Chemistry Recent Labs  
  02/22/19 
0425 02/21/19 
1820 02/21/19 
0345 02/20/19 
3421 * 164* 138* 126* * 133* 137 135* K 4.1 4.6 3.9 3.8 CL 97* 97* 99* 98* CO2 26 24 29 26 BUN 16 15 11 19* CREA 3.79* 3.46* 2.82* 4.50* CA 9.3 9.5 8.7 8.9 AGAP 12 12 9 11 BUCR 4* 4* 4* 4* ALB 2.1*  --  2.0* 1.9*  
PHOS 2.7  --  2.7 3.1 Lab Results Component Value Date/Time Iron 67 02/09/2019 05:48 AM  
 TIBC 143 (L) 02/09/2019 05:48 AM  
 Iron % saturation 47 02/09/2019 05:48 AM  
  
Lab Results Component Value Date/Time Calcium 9.3 02/22/2019 04:25 AM  
 CALCIUM,IONIZED 4.50 10/30/2018 11:15 AM  
 Phosphorus 2.7 02/22/2019 04:25 AM  
  
 
 
Ricky Lyons M.D [facility] Office [phone] Pager [phone]

## 2019-02-22 NOTE — PROGRESS NOTES
Pt still awaiting dialysis today. Notified [facility] that pt will be coming to them tomorrow. Asked that on call admissions personnel be notified tomorrow when time is known. Number to call is [phone]. Called eileen Bhagat and let him know pt will be discharged tomorrow. He states he will pick her up at discharge

## 2019-02-22 NOTE — DIALYSIS
ACUTE HEMODIALYSIS FLOW SHEET 
 
HEMODIALYSIS ORDERS: Physician: Stacy Mejia Dialyzer: revaclear         Duration:3.5  hr  BFR:350    DFR: 800 Dialysate:  Temp36  K+  3     Ca+2.5   Na 140 Bicarb 35 Weight:   kg    Bed Scale []     Unable to Obtain []      Dry weight/UF Goal:3000  Access Needle Gauge Heparin []  Bolus      Units    [] Hourly       Units    [x]None Catheter locking solution Pre BP:       Pulse:          Temperature:     Respirations:   Tx: NS       ml/Bolus  Other        [] N/A Labs: Pre        Post:        [x] N/A Additional Orders(medications, blood products, hypotension management):       [x] N/A [x] Time Out/Safety Check  [x] [facility] Consent Verified CATHETER ACCESS: [x]N/A   []Right   []Left   []IJ     []Fem [] First use X-ray verified     []Tunnel                [] Non Tunneled []No S/S infection  []Redness  []Drainage []Cultured []Swelling []Pain []Medical Aseptic Prep Utilized   []Dressing Changed  [] Biopatch  Date:      
[]Clotted   []Patent   Flows: []Good  []Poor  []Reversed If access problem,  notified: []Yes    [x]N/A  Date:        
 
GRAFT/FISTULA ACCESS:  []N/A     []Right     [x]Left     [x]UE     []LE []AVG   [x]AVF        []Buttonhole    [x]Medical Aseptic Prep Utilized [x]No S/S infection  []Redness  []Drainage []Cultured []Swelling []Pain Bruit:   [x] Strong    [] Weak       Thrill :   [x] Strong    [] Weak Needle Gauge: 15   Length: 1 If access problem,  notified: []Yes     [x]N/A  Date:       
Please describe access if present and not used:  
 
 
GENERAL ASSESSMENT:   
LUNGS:  Rate  SaO2%        [] N/A    [] Clear  [x] Coarse  [] Crackles  [] Wheezing 
      [x] Diminished     Location : []RLL   []LLL    []RUL  []ADINA Cough: [x]Productive  []Dry  []N/A   Respirations:  [x]Easy  []Labored Therapy:  [x]RA  []NC  l/min    Mask: []NRB []Venti       O2% []Ventilator  []Intubated  [] Trach  [] BiPaP CARDIAC: [x]Regular      [] Irregular   [] Pericardial Rub  [] JVD []  Monitored  [] Bedside  [] Remotely monitored [] N/A  Rhythm: EDEMA: [] None  [x]Generalized  [] Pitting [] 1    [] 2    [] 3    [] 4 [] Facial  [] Pedal  []  UE  [] LE  
SKIN:   [x] Warm  [] Hot     [] Cold   [x] Dry     [] Pale   [] Diaphoretic    
             [] Flushed  [] Jaundiced  [] Cyanotic  [] Rash  [] Weeping LOC:    [x] Alert      [x]Oriented:    [x] Person     [x] Place  [x]Time 
             [] Confused  [] Lethargic  [] Medicated  [] Non-responsive GI / ABDOMEN:  [] Flat    [] Distended    [x] Soft    [] Firm   []  Obese 
                           [] Diarrhea  [x] Bowel Sounds  [] Nausea  [] Vomiting  / URINE ASSESSMENT:[] Voiding   [x] Oliguria  [] Anuria   []  Reis [] Incontinent    []  Incontinent Brief      []  Bathroom Privileges PAIN: [x] 0 []1  []2   []3   []4   []5   []6   []7   []8   []9   []10 Scale 0-10  Action/Follow Up: MOBILITY:  [] Amb    [] Amb/Assist    [x] Bed    [] Wheelchair  [] Stretcher All Vitals and Treatment Details on Attached Flowsheet Hospital: [facility] Room # [id number] [] 1st Time Acute  [] Stat  [x] Routine  [] Urgent [x] Acute Room  []  Bedside  [] ICU/CCU  [] ER Isolation Precautions:  [x] Dialysis   [] Airborne   [] Contact    [] Reverse Special Considerations:         [] Blood Consent Verified [x]N/A ALLERGIES:   [x] NKA Code Status:  [x] Full Code  [] DNR  [] Other HBsAg ONLY: Date Drawn 2/8/19         [x]Negative []Positive []Unknown HBsAb: Date  2/8/19   [] Susceptible   [x] [id number] []Not Drawn  [] Drawn Current Labs:    Date of Labs: Today [x] Results for Sindhu Roman (MRN [id number]) as of 2/22/2019 18:37 Ref. Range 2/22/2019 04:25 WBC Latest Ref Range: 4.6 - 13.2 K/uL 8.2 RBC Latest Ref Range: 4.20 - 5.30 M/uL 3.12 (L) HGB Latest Ref Range: 12.0 - 16.0 g/dL 8.6 (L) HCT Latest Ref Range: 35.0 - 45.0 % 26.6 (L) MCV Latest Ref Range: 74.0 - 97.0 FL 85.3 MCH Latest Ref Range: 24.0 - 34.0 PG 27.6 MCHC Latest Ref Range: 31.0 - 37.0 g/dL 32.3 RDW Latest Ref Range: 11.6 - 14.5 % 25.4 (H) PLATELET Latest Ref Range: 135 - 420 K/uL 112 (L) NEUTROPHILS Latest Ref Range: 40 - 73 % 57 LYMPHOCYTES Latest Ref Range: 21 - 52 % 25 MONOCYTES Latest Ref Range: 3 - 10 % 14 (H) EOSINOPHILS Latest Ref Range: 0 - 5 % 2  
BASOPHILS Latest Ref Range: 0 - 2 % 2 DF Latest Units:   AUTOMATED  
ABS. NEUTROPHILS Latest Ref Range: 1.8 - 8.0 K/UL 4.6  
ABS. LYMPHOCYTES Latest Ref Range: 0.9 - 3.6 K/UL 2.0  
ABS. MONOCYTES Latest Ref Range: 0.05 - 1.2 K/UL 1.2  
ABS. EOSINOPHILS Latest Ref Range: 0.0 - 0.4 K/UL 0.2  
ABS. BASOPHILS Latest Ref Range: 0.0 - 0.1 K/UL 0.2 (H) INR Latest Ref Range: 0.8 - 1.2   1.4 (H) Prothrombin time Latest Ref Range: 11.5 - 15.2 sec 17.1 (H) Sodium Latest Ref Range: 136 - 145 mmol/L 135 (L) Potassium Latest Ref Range: 3.5 - 5.5 mmol/L 4.1 Chloride Latest Ref Range: 100 - 108 mmol/L 97 (L)  
CO2 Latest Ref Range: 21 - 32 mmol/L 26 Anion gap Latest Ref Range: 3.0 - 18 mmol/L 12 Glucose Latest Ref Range: 74 - 99 mg/dL 114 (H) BUN Latest Ref Range: 7.0 - 18 MG/DL 16  
Creatinine Latest Ref Range: 0.6 - 1.3 MG/DL 3.79 (H) BUN/Creatinine ratio Latest Ref Range: 12 - 20   4 (L) Calcium Latest Ref Range: 8.5 - 10.1 MG/DL 9.3 Phosphorus Latest Ref Range: 2.5 - 4.9 MG/DL 2.7 GFR est non-AA Latest Ref Range: >60 ml/min/1.73m2 13 (L)  
GFR est AA Latest Ref Range: >60 ml/min/1.73m2 15 (L) Albumin Latest Ref Range: 3.4 - 5.0 g/dL 2.1 (L) Results for Teresa Cooper (MRN [id number]) as of 2/22/2019 18:37 Ref. Range 2/8/2019 06:05 Hepatitis B surface Ag Latest Ref Range: <1.00 Index <0.10 Hep B surface Ag Interp. Latest Ref Range: NEG   NEGATIVE Hepatitis B surface Ab Latest Ref Range: >10.0 mIU/mL >1,000.00 Hep B surface Ab Interp. Latest Ref Range: POS   POSITIVE Hep B surface Ab comment Latest Units:   Samples with a  v... Cut and paste current labs here. DIET:  [x] Renal    [] Other     [] NPO     []  Diabetic PRIMARY NURSE REPORT: First initial/Last name/Title Pre Dialysis: NILSA Bruno RN    Time:  1479 EDUCATION:   
[x] Patient [] Other         Knowledge Basis: []None []Minimal [x] Substantial  
Barriers to learning  []N/A [x] Access Care     [] S&S of infection     [] Fluid Management     []K+     [x]Procedural   
[]Albumin     [] Medications     [] Tx Options     [] Transplant     [] Diet     [] Other Teaching Tools:  [x] Explain  [] Demo  [] Handouts [] Video Patient response:   [x] Verbalized understanding  [] Teach back  [] Return demonstration [] Requires follow up Inappropriate due to         
 
[address] Before each treatment:    
Machine Number:                   [address] [facility]  
                                [] Unit Machine #  with centralized RO 
                                [] Portable Machine #1/RO serial # [id number] [] Portable Machine #2/RO serial # [id number] [] Portable Machine #3/RO serial # [id number] [address] [place] [address] 
                                [] Portable Machine #11/RO serial # [id number] [x] Portable Machine #12/RO serial # [id number] [] Portable Machine #13/RO serial #  [id number] Alarm Test:  Pass time  1930        Other: [x] RO/Machine Log Complete Temp       37         [x]Extracorporeal Circuit Tested for integrity Dialysate: pH 7  Conductivity: Meter 14     HD Machine 14                    TCD: 13.9 Dialyzer Lot #  [id number]         Blood Tubing Lot #           Saline Lot #  [id number] CHLORINE TESTING-Before each treatment and every 4 hours Total Chlorine: [x] less than 0.1 ppm  Time:1930  4 Hr/2nd Check Time:   
(if greater than 0.1 ppm from Primary then every 30 minutes from Secondary) TREATMENT INITIATION  with Dialysis Precautions:  
[x] All Connections Secured                 [x] Saline Line Double Clamped  
[x] Venous Parameters Set                  [x] Arterial Parameters Set [x] Prime Given   ml  250           [x]Air Foam Detector Engaged Treatment Initiation Note: started treatment using left arm fistula Medication Dose Volume Route Initials Dialyzer Cleared: [x] Good [] Fair  [] Poor Blood processed:   L 
UF [id number] Ml Post Wt:     kg 
POst BP:  119/73        Pulse: 105      Respirations:  20 Temperature: 98 
  
                              Post Tx Vascular Access: AVF/AVG: Bleeding stopped Art 6 min. Osmany.5  Min   N/A Catheter: Locking solution: Heparin 1:1000 Art. Osmany. N/A Post Assessment:  
  
                              Skin:  [x] Warm  [x] Dry [] Diaphoretic    [] Flushed  [] Pale [] Cyanotic [facility] Title Initials  Time Lungs: [x] Clear    [] Course  [] Crackles  [] Wheezing [] Diminished Cardiac: [x] Regular   [] Irregular   [] Monitor  [] N/A  Rhythm:      
    Edema:  [] None    [x] General     [] Facial   [] Pedal    [] UE    [] LE  
    Pain: [x]0  []1  []2   []3  []4   []5   []6   []7   []8   []9   []10 Post Treatment Note: pt tolerated treatment well POST TREATMENT PRIMARY NURSE HANDOFF REPORT:  
 
First initial/Last name/Title Post Dialysis:NILSA Bruno RN  Time: 0483 Abbreviations: AVG-arterial venous graft, AVF-arterial venous fistula, IJ-Internal Jugular, Subcl-Subclavian, Fem-Femoral, Tx-treatment, AP/HR-apical heart rate, DFR-dialysate flow rate, BFR-blood flow rate, AP-arterial pressure, -venous pressure, UF-ultrafiltrate, TMP-transmembrane pressure, Osmany-Venous, Art-Arterial, RO-Reverse Osmosis

## 2019-02-22 NOTE — PROGRESS NOTES
Problem: Mobility Impaired (Adult and Pediatric) Goal: *Acute Goals and Plan of Care (Insert Text) Physical Therapy Goals Re-evaluated 2/17/2019; Updated goals and to be accomplished within 7 day(s) 1. Patient will move from supine to sit and sit to supine  in bed with modified independence. 2.  Patient will transfer from bed to chair and chair to bed with modified independence using the least restrictive device. 3.  Patient will perform sit to stand with modified independence. 4.  Patient will ambulate with modified independence for 50 feet with the least restrictive device. Initiated 2/11/2019 and to be accomplished within 3 day trial.  
If appropriate continue per plan of care to accomplish goals within 7 days. 1.  Patient will maintain eyes open for greater than 90% of session to allow for active participation in mobility training. 2.  Patient will follow 90% of commands to maximize safety and active participation in mobility training. 3.  Patient will move from supine to sit and sit to supine in bed with supervision/set-up. 4.  Patient will maintain seated at edge of bed for 8 min with supervision/set-up to prepare for out of bed activity. 5.  Patient will perform sit to stand with minimal assistance/contact guard assist. 
6.  Patient will transfer from bed to chair and chair to bed with minimal assistance/contact guard assist using the least restrictive device. 7.  Patient will ambulate with minimal assistance/contact guard assist for 10 feet with the least restrictive device. Outcome: Progressing Towards Goal 
 
PHYSICAL THERAPY: Daily TREATMENT Note INPATIENT: Medicare: Hospital Day: 15 Patient: Dhruv Isaac (89 y.o. female)    Date: 2/22/2019 Primary Diagnosis: UTI (urinary tract infection) [N39.0] Metabolic encephalopathy [E59.62] Procedure(s) (LRB): ENDOSCOPIC ULTRASOUND (EUS) (N/A), 9 Days Post-Op, Precautions: Fall, Skin Chart, physical therapy assessment, plan of care and goals were reviewed. PLOF:with rollator at home ASSESSMENT: 
Pt very motivated to progress with gait training, reporting significant pain in LE's and required extended rest breaks however pt determined to increase distance and activity tolerance. Pt with several LOB throughout (approx. 8) requiring min a for balance correction. Pt has rollator, however pt utilized RW for gait training for pacing and safety. Pt amb 50 ft X 2 with rest breaks then 100 ft, with slow pacing, narrow TED/ tandem pattern and LOB to L. Pt with decreased insight into deficits, and highly distractable, requiring redirection and attention to task. Nursing notified of pt reports of pain. Progression toward goals: 
    [x]  Improving appropriately and progressing toward goals 
    []  Improving slowly and progressing toward goals 
    [] Not making progress toward goals and plan of care will be adjusted PLAN: 
Patient continues to benefit from skilled intervention to address the above impairments. Continue treatment per established plan of care. EDUCATION:  
Education:  Patient was educated on the following topics: safety with RW Barriers to Learning/Limitations: None Compensate with: visual, verbal, tactile, kinesthetic cues/model Discharge Recommendations:  Santhosh Flores Further Equipment Recommendations for Discharge:  rolling walker and N/A Factors which may impact discharge planning: none SUBJECTIVE:  
Patient stated Just be patient with me.  OBJECTIVE DATA SUMMARY:  
Critical Behavior: 
Neurologic State: Alert, Appropriate for age Orientation Level: Oriented X4 Cognition: Appropriate decision making, Appropriate for age attention/concentration, Appropriate safety awareness Safety/Judgement: Decreased insight into deficits Functional Mobility: 
 
 
Functional Status Indep (I) Mod I Super-vision Min A Mod A Max A  
 Total A Assist x2 Verbal cues Additional time Not tested Comments Rolling []  []  [x] []    []    []  []  [] [x] [x] [] Supine to sit []  []  [x] []  []  []  []  [] [x] [x] [] Sit to supine []  []  [] [x]  []  []  []  [x] [x] [x] [] Sit to stand []  []  [] [x]  []  []  []  [x] [x] [x] [] Stand to sit []  []  [] [x]  []  []  []  [x] [x] [x] [] Bed to chair transfers []  []  [] [x]  []  []  []  [x] [x] [x] [] Balance Good [place] Poor Unable Not tested Comments Sitting static [x]  []  []  []  [] Sitting dynamic [x]  []  []  []  []   
Standing static []  [x]  []  []  []   
Standing dynamic []  [x]  []  []  [] Mobility/Gait:  
Level of Assistance: Contact guard assistance and Minimum assistance Assistive Device: rolling walker Distance Ambulated: 50 feet X2, 100 ft Base of Support: center of gravity altered, narrow Speed/Tasha: slow Step Length: left shortened and right shortened Swing Pattern: left asymmetrical 
Stance: right increased Gait Abnormalities: shuffling gait Stairs:  
Level of Assistance: Unable at this time Vital Signs Temp: 98.8 °F (37.1 °C) Pulse (Heart Rate): 95    
BP: 114/67 Resp Rate: 16    
O2 Sat (%): 100 %Pain:Pre treatment pain level:0 Post treatment pain level:0Pain Scale 1: Numeric (0 - 10) Pain Intensity 1: 0 Pain Location 1: Back Pain Orientation 1: Lower Pain Description 1: Aching Pain Intervention(s) 1: Medication (see MAR) Activity Tolerance:  
Good After treatment:  
[]  Patient left in no apparent distress sitting up in chair 
[x]  Patient left in no apparent distress in bed 
[x]  Call bell left within reach [x]  Nursing notified 
[]  Caregiver present 
[]  Bed alarm activated Mary Rowe PTA Time Calculation: 54 mins

## 2019-02-23 VITALS
WEIGHT: 135.9 LBS | OXYGEN SATURATION: 100 % | TEMPERATURE: 98.2 F | DIASTOLIC BLOOD PRESSURE: 56 MMHG | SYSTOLIC BLOOD PRESSURE: 116 MMHG | HEIGHT: 65 IN | RESPIRATION RATE: 16 BRPM | BODY MASS INDEX: 22.64 KG/M2 | HEART RATE: 96 BPM

## 2019-02-23 PROBLEM — E11.9 DIABETES MELLITUS TYPE 2, CONTROLLED (HCC): Status: ACTIVE | Noted: 2019-02-23

## 2019-02-23 PROBLEM — M79.604 PAIN OF RIGHT LOWER EXTREMITY: Status: ACTIVE | Noted: 2019-02-23

## 2019-02-23 PROBLEM — R57.9 SHOCK (HCC): Status: RESOLVED | Noted: 2019-02-15 | Resolved: 2019-02-23

## 2019-02-23 LAB
ALBUMIN SERPL-MCNC: 1.8 G/DL (ref 3.4–5)
ANION GAP SERPL CALC-SCNC: 9 MMOL/L (ref 3–18)
BASOPHILS # BLD: 0.2 K/UL (ref 0–0.1)
BASOPHILS NFR BLD: 2 % (ref 0–2)
BUN SERPL-MCNC: 11 MG/DL (ref 7–18)
BUN/CREAT SERPL: 4 (ref 12–20)
CALCIUM SERPL-MCNC: 9 MG/DL (ref 8.5–10.1)
CHLORIDE SERPL-SCNC: 98 MMOL/L (ref 100–108)
CO2 SERPL-SCNC: 27 MMOL/L (ref 21–32)
CREAT SERPL-MCNC: 3.03 MG/DL (ref 0.6–1.3)
DIFFERENTIAL METHOD BLD: ABNORMAL
EOSINOPHIL # BLD: 0.2 K/UL (ref 0–0.4)
EOSINOPHIL NFR BLD: 2 % (ref 0–5)
ERYTHROCYTE [DISTWIDTH] IN BLOOD BY AUTOMATED COUNT: 25.5 % (ref 11.6–14.5)
GLUCOSE BLD STRIP.AUTO-MCNC: 191 MG/DL (ref 70–110)
GLUCOSE SERPL-MCNC: 171 MG/DL (ref 74–99)
HCT VFR BLD AUTO: 25 % (ref 35–45)
HGB BLD-MCNC: 8.2 G/DL (ref 12–16)
INR PPP: 1.3 (ref 0.8–1.2)
LYMPHOCYTES # BLD: 1.7 K/UL (ref 0.9–3.6)
LYMPHOCYTES NFR BLD: 20 % (ref 21–52)
MCH RBC QN AUTO: 27.5 PG (ref 24–34)
MCHC RBC AUTO-ENTMCNC: 32.8 G/DL (ref 31–37)
MCV RBC AUTO: 83.9 FL (ref 74–97)
MONOCYTES # BLD: 0.9 K/UL (ref 0.05–1.2)
MONOCYTES NFR BLD: 10 % (ref 3–10)
NEUTS SEG # BLD: 5.6 K/UL (ref 1.8–8)
NEUTS SEG NFR BLD: 66 % (ref 40–73)
PHOSPHATE SERPL-MCNC: 2 MG/DL (ref 2.5–4.9)
PLATELET # BLD AUTO: 122 K/UL (ref 135–420)
POTASSIUM SERPL-SCNC: 3.5 MMOL/L (ref 3.5–5.5)
PROTHROMBIN TIME: 15.9 SEC (ref 11.5–15.2)
RBC # BLD AUTO: 2.98 M/UL (ref 4.2–5.3)
SODIUM SERPL-SCNC: 134 MMOL/L (ref 136–145)
WBC # BLD AUTO: 8.4 K/UL (ref 4.6–13.2)

## 2019-02-23 PROCEDURE — 77030037878 HC DRSG MEPILEX >48IN BORD MOLN -B

## 2019-02-23 PROCEDURE — 74011250637 HC RX REV CODE- 250/637: Performed by: HOSPITALIST

## 2019-02-23 PROCEDURE — 74011636637 HC RX REV CODE- 636/637: Performed by: FAMILY MEDICINE

## 2019-02-23 PROCEDURE — 74011250637 HC RX REV CODE- 250/637: Performed by: INTERNAL MEDICINE

## 2019-02-23 PROCEDURE — 80069 RENAL FUNCTION PANEL: CPT

## 2019-02-23 PROCEDURE — 36415 COLL VENOUS BLD VENIPUNCTURE: CPT

## 2019-02-23 PROCEDURE — 74011250637 HC RX REV CODE- 250/637: Performed by: PHYSICIAN ASSISTANT

## 2019-02-23 PROCEDURE — 82962 GLUCOSE BLOOD TEST: CPT

## 2019-02-23 PROCEDURE — 85610 PROTHROMBIN TIME: CPT

## 2019-02-23 PROCEDURE — 85025 COMPLETE CBC W/AUTO DIFF WBC: CPT

## 2019-02-23 RX ORDER — GABAPENTIN 100 MG/1
100 CAPSULE ORAL 3 TIMES DAILY
Qty: 90 CAP | Refills: 0 | Status: SHIPPED
Start: 2019-02-23

## 2019-02-23 RX ORDER — ALBUTEROL SULFATE 0.83 MG/ML
2.5 SOLUTION RESPIRATORY (INHALATION)
Qty: 24 EACH | Refills: 0 | Status: SHIPPED
Start: 2019-02-23

## 2019-02-23 RX ORDER — PANTOPRAZOLE SODIUM 40 MG/1
40 TABLET, DELAYED RELEASE ORAL
Qty: 30 TAB | Refills: 0 | Status: SHIPPED
Start: 2019-02-24

## 2019-02-23 RX ORDER — MIDODRINE HYDROCHLORIDE 2.5 MG/1
7.5 TABLET ORAL EVERY 8 HOURS
Qty: 270 TAB | Refills: 0 | Status: SHIPPED
Start: 2019-02-23 | End: 2019-03-25

## 2019-02-23 RX ORDER — OXYCODONE HYDROCHLORIDE 5 MG/1
2.5 TABLET ORAL
Qty: 20 TAB | Refills: 0 | Status: SHIPPED | OUTPATIENT
Start: 2019-02-23

## 2019-02-23 RX ORDER — INSULIN GLARGINE 100 [IU]/ML
5 INJECTION, SOLUTION SUBCUTANEOUS
Qty: 1 PEN | Refills: 0 | Status: SHIPPED
Start: 2019-02-23

## 2019-02-23 RX ADMIN — LACTULOSE 10 G: 20 SOLUTION ORAL at 09:59

## 2019-02-23 RX ADMIN — OXYCODONE HYDROCHLORIDE 2.5 MG: 5 TABLET ORAL at 00:14

## 2019-02-23 RX ADMIN — RIFAXIMIN 550 MG: 550 TABLET ORAL at 09:59

## 2019-02-23 RX ADMIN — PANTOPRAZOLE SODIUM 40 MG: 40 TABLET, DELAYED RELEASE ORAL at 09:59

## 2019-02-23 RX ADMIN — Medication 1 TABLET: at 09:59

## 2019-02-23 RX ADMIN — GABAPENTIN 100 MG: 100 CAPSULE ORAL at 09:59

## 2019-02-23 RX ADMIN — MIDODRINE HYDROCHLORIDE 7.5 MG: 2.5 TABLET ORAL at 00:13

## 2019-02-23 RX ADMIN — INSULIN LISPRO 2 UNITS: 100 INJECTION, SOLUTION INTRAVENOUS; SUBCUTANEOUS at 10:00

## 2019-02-23 RX ADMIN — INSULIN LISPRO 4 UNITS: 100 INJECTION, SOLUTION INTRAVENOUS; SUBCUTANEOUS at 00:14

## 2019-02-23 NOTE — ROUTINE PROCESS
1920: assumed care. Awake. HOB elevated. No sob on RA. Call light within reach. Family at bedside. 1955: Patient off floor via bed to RDU. 
 
2052: Medicated for pain per patient request. 
 
5802: Patient back in room. V/s taken. .  
 
2255: Due meds given. 0014: Medicated for pain per patient request. Insulin coverage provided per protocol. 0200: Sleeping. 0340: No change from previous assessment. 0630: Slept good thru night. Needs attended. 7720: Bedside and Verbal shift change report given to Jenny. [address] (oncoming nurse) by me (offgoing nurse). Report included the following information SBAR, Kardex, Intake/Output, MAR and Recent Results.

## 2019-02-23 NOTE — PROGRESS NOTES
Internal Medicine Progress Note Patient's Name: Bruno Gibson Admit Date: 2/8/2019 Length of Stay: 14 
 
 
Assessment/Plan Principal Problem: 
  SBP (spontaneous bacterial peritonitis) ([address] (2/12/2019) Active Problems: 
  ESRD (end stage renal disease) on dialysis ([address] (2/8/2019) Hyperbilirubinemia (2/8/2019) COPD (chronic obstructive pulmonary disease) ([address] (2/8/2019) Combined systolic and diastolic heart failure ([address] (2/10/2019) Cirrhosis ([address] (2/8/2019) Anemia, chronic disease (2/15/2019) Coagulopathy ([address] (2/15/2019) Shock ([address] (2/15/2019) Thrombocytopenia ([address] (2/16/2019) Severe protein-calorie malnutrition ([address] (2/19/2019) SBP 
-cont rocephin-10day course completed, continue day to day 
- paracentesis done 2/11, cell count profile consistent with bacterial peritonitis 
  
ESRD on dialysis 
- nephro consult - appreciate assistance 
- HD on 2/9, 2/13, 2/18, 2/20 
- hold dialysis on 2/12 d/t HOTN - midodrine added 
- dialysis done 2/13, albumin given, no fluid pulled off 
-transfused one unit 2/20 
- awaiting dialysis 2/22, will d/c to signature tomorrow 
  
Cholestatic jaundice 
- CT abd results below - GI consult - appreciate assistance - serologies, paracentesis to eval fluid, start lactulose/rifaximin, vit K x3 days - MRCP w/o contrast, transjugular liver bx ordered - MRCP unable to be done - ERCP done 2/13 - results below, GI recs medical management of decompensated chronic liver disease with cholestasis -Transjugular liver biopsy when more stable 
 -T bili 14.8 
  
HF 
- cardio consult - appreciate assistance 
  
COPD 
- brovana, pulmicort 
-Duoneb RT, QID 
  
Acute Resp failure 
 -stable NC  
  
Shock 
-Levophed D/C  
-BP stable - Cont acceptable home medications for chronic conditions  
- DVT protocol I have personally reviewed all pertinent labs and films that have officially resulted over the last 24 hours. I have personally checked for all pending labs that are awaiting final results. Interval History Frank Smith a 46 y.o. female with a PMHx ESRD on dialysis, DM, CAD, COPD, syst/diastolic heart failure, HTN, chronic liver disease who presented to the ED from dialysis due to AMS. History limited 2/2 AMS. ED eval revealed scleral icterus, confusion, leukocytosis, UTI. Hyperbilirubinemia (21.7) and elevated alk phos (571) are similar to recent ED visit at [facility] 2/1. Review of prior records from [facility] revealed she was supposed to have a percutaneous gallbladder drain placed on 2/4/19. Head CT neg. Pt will be admitted for further evaluation. IV rocephin started. Nephrology consulted - HD on 2/9 as patient missed the prior day. CT abd without contrast ordered, results below. GI consulted - serologies, paracentesis to eval fluid, start lactulose/rifaximin, vit K x3 days, cardio consult. UCx negative. PT recommending SNF. Paracentesis performed 2/11 - removed 100cc dark jed fluid, cell count profile consistent with SBP. Pt already on rocephin. Fluid cx ngtd. Midodrine added for HOTN. MRCP w/o contrast and transjugular liver bx ordered per GI. MRCP unable to be done 2/2 patient not cooperating. ERCP done on 2/13 instead, no CBD stone or stricture seen. Patient transferred to ICU afterwards 2/2 HOTN and need for pressors instead of IVF due to ESRD. ICU team concerned about AMS and pt's ability to protect airway so patient was intubated. Pt's daughter informed of recent events, wishes for patient to remain full code.   Dialysis 2/15.  SBT, patient self extubated.  Remains on pressors but requiring less.  Passed swallow eval. Pressors D/C, BP stabilizing.  Dialysis 2/18, 2/20.  Transjuglar liver biopsy when stable.  EGD duodenal ulcers, no CBD stone.  Pt Hgb 6.6, transfused one unit, rechecked, and stable.   SNF placement 2/21, will continue HD outpatient, patient appears to have returned to her baseline. Awaiting dialysis prior to discharge today. Will d/c tomorrow 2/23. Subjective Pt s/e @ bedside. No major events overnight. Pt c/o LLE pain - feels like pins and needles. Denies CP or SOB. Denies abd pain, nvd. Objective Visit Vitals /71 (BP 1 Location: Right arm, BP Patient Position: At rest) Pulse 95 Temp 98.6 °F (37 °C) Resp 16 Ht 5' 5\" (1.651 m) Wt 62.5 kg (137 lb 12.8 oz) SpO2 98% BMI 22.93 kg/m² Physical Exam: 
General Appearance: NAD, conversant HEENT: normocephalic/atraumatic, moist mucus membranes, scleral icterus Neck: No JVD, supple Lungs: CTA with normal respiratory effort CV: RRR, no m/r/g Abdomen: soft, non-tender, normal bowel sounds Extremities: no cyanosis, 1+ pitting edema BLE Neuro: No focal deficits, motor/sensory intact Skin: Normal color, intact Intake and Output: 
Current Shift:  No intake/output data recorded. Last three shifts:  02/21 0701 - 02/22 1900 In: 1360 [P.O.:1360] Out: -  
 
Lab/Data Reviewed: 
BMP:  
Lab Results Component Value Date/Time  (L) 02/22/2019 04:25 AM  
 K 4.1 02/22/2019 04:25 AM  
 CL 97 (L) 02/22/2019 04:25 AM  
 CO2 26 02/22/2019 04:25 AM  
 AGAP 12 02/22/2019 04:25 AM  
  (H) 02/22/2019 04:25 AM  
 BUN 16 02/22/2019 04:25 AM  
 CREA 3.79 (H) 02/22/2019 04:25 AM  
 GFRAA 15 (L) 02/22/2019 04:25 AM  
 GFRNA 13 (L) 02/22/2019 04:25 AM  
 
CBC:  
Lab Results Component Value Date/Time WBC 8.2 02/22/2019 04:25 AM  
 HGB 8.6 (L) 02/22/2019 04:25 AM  
 HCT 26.6 (L) 02/22/2019 04:25 AM  
  (L) 02/22/2019 04:25 AM  
 
 
Imaging Reviewed: 
No results found. Medications Reviewed: 
Current Facility-Administered Medications Medication Dose Route Frequency  gabapentin (NEURONTIN) capsule 100 mg  100 mg Oral TID  oxyCODONE IR (ROXICODONE) tablet 2.5 mg  2.5 mg Oral Q3H PRN  
  albuterol-ipratropium (DUO-NEB) 2.5 MG-0.5 MG/3 ML  3 mL Nebulization Q4H PRN  
 0.9% sodium chloride infusion 250 mL  250 mL IntraVENous PRN  
 albuterol (PROVENTIL VENTOLIN) nebulizer solution 2.5 mg  2.5 mg Nebulization Q4H PRN  pantoprazole (PROTONIX) tablet 40 mg  40 mg Oral ACB  midodrine (PROAMITINE) tablet 7.5 mg  7.5 mg Oral Q8H  
 insulin lispro (HUMALOG) injection   SubCUTAneous AC&HS  
 glucose chewable tablet 16 g  4 Tab Oral PRN  
 glucagon (GLUCAGEN) injection 1 mg  1 mg IntraMUSCular PRN  
 dextrose (D50) infusion 12.5-25 g  25-50 mL IntraVENous PRN  
 epoetin briseida (EPOGEN;PROCRIT) injection 10,000 Units  10,000 Units IntraVENous DIALYSIS MON, WED & FRI  rifAXIMin (XIFAXAN) tablet 550 mg  550 mg Oral BID  lactulose (CHRONULAC) solution 10 g  15 mL Oral TID  B complex-vitamin C-folic acid (NEPHRO-NACHO) 0.8 mg tab  1 Tab Oral DAILY Haroon Williamson PA-C [address] [place], [place] [address] Hospitalist Division Office:  [phone] Pager: [phone]

## 2019-02-23 NOTE — PROGRESS NOTES
Problem: Pressure Injury - Risk of 
Goal: *Prevention of pressure injury Document Honorio Scale and appropriate interventions in the flowsheet. Outcome: Progressing Towards Goal 
Pressure Injury Interventions: 
Sensory Interventions: Float heels, Keep linens dry and wrinkle-free, Maintain/enhance activity level, Pressure redistribution bed/mattress (bed type), Use 30-degree side-lying position Moisture Interventions: Absorbent underpads Activity Interventions: Pressure redistribution bed/mattress(bed type) Mobility Interventions: Float heels, Pressure redistribution bed/mattress (bed type) Nutrition Interventions: Document food/fluid/supplement intake Friction and Shear Interventions: Foam dressings/transparent film/skin sealants, Sit at 90-degree angle

## 2019-02-23 NOTE — PROGRESS NOTES
Problem: Falls - Risk of 
Goal: *Absence of Falls Document Mary Fearing Fall Risk and appropriate interventions in the flowsheet. Outcome: Progressing Towards Goal 
Fall Risk Interventions: 
Mobility Interventions: Bed/chair exit alarm, Communicate number of staff needed for ambulation/transfer, Patient to call before getting OOB, Utilize walker, cane, or other assistive device Mentation Interventions: Adequate sleep, hydration, pain control, Door open when patient unattended, Bed/chair exit alarm, Family/sitter at bedside Medication Interventions: Bed/chair exit alarm, Patient to call before getting OOB Elimination Interventions: Bed/chair exit alarm, Call light in reach, Patient to call for help with toileting needs History of Falls Interventions: Bed/chair exit alarm, Door open when patient unattended

## 2019-02-23 NOTE — DISCHARGE SUMMARY
[facility]  Hospitalist Division    Discharge Summary    Patient: Fredis Fitzpatrick MRN: [id number]  CSN: [id number]    YOB: 1970  Age: 46 y.o.   Sex: female    DOA: 2/8/2019 LOS:  LOS: 15 days   Discharge Date:      Admission Diagnoses: UTI (urinary tract infection) [L49.7]  Metabolic encephalopathy [F79.92]    Discharge Diagnoses:    Problem List as of 2/23/2019 Date Reviewed: 2/8/2019          Codes Class Noted - Resolved    * (Principal) SBP (spontaneous bacterial peritonitis) ([facility] ICD-10-CM: Q52.5  ICD-9-CM: 567.23  2/12/2019 - Present        ESRD (end stage renal disease) on dialysis [facility]) ICD-10-CM: N18.6, Z99.2  ICD-9-CM: 585.6, V45.11  2/8/2019 - Present        Hyperbilirubinemia ICD-10-CM: E80.6  ICD-9-CM: 782.4  2/8/2019 - Present        Combined systolic and diastolic heart failure (HCC) ICD-10-CM: I50.40  ICD-9-CM: 428.40  2/10/2019 - Present        COPD (chronic obstructive pulmonary disease) ([facility] ICD-10-CM: J44.9  ICD-9-CM: 496  2/8/2019 - Present        Cholestatic jaundice ICD-10-CM: R17  ICD-9-CM: 782.4  2/12/2019 - Present        Pain of right lower extremity ICD-10-CM: M79.604  ICD-9-CM: 729.5  2/23/2019 - Present        Diabetes mellitus type 2, controlled ([facility] ICD-10-CM: E11.9  ICD-9-CM: 250.00  2/23/2019 - Present        Severe protein-calorie malnutrition ([facility] ICD-10-CM: E43  ICD-9-CM: 262  2/19/2019 - Present        Thrombocytopenia ([facility] ICD-10-CM: D69.6  ICD-9-CM: 287.5  2/16/2019 - Present        Anemia, chronic disease ICD-10-CM: D63.8  ICD-9-CM: 285.29  2/15/2019 - Present        Coagulopathy ([facility] ICD-10-CM: D68.9  ICD-9-CM: 286.9  2/15/2019 - Present        Cirrhosis ([facility] ICD-10-CM: K74.60  ICD-9-CM: 571.5  2/8/2019 - Present        RESOLVED: Ileus ([facility] ICD-10-CM: K56.7  ICD-9-CM: 560.1  2/17/2019 - 2/19/2019        RESOLVED: Acute respiratory failure ([facility] ICD-10-CM: J96.00  ICD-9-CM: 518.81  2/15/2019 - 2/19/2019        RESOLVED: Shock ([facility] ICD-10-CM: R57.9  ICD-9-CM: 785.50  2/15/2019 - 2/23/2019        RESOLVED: Hypotension ICD-10-CM: I95.9  ICD-9-CM: 458.9  2/13/2019 - 2/19/2019        RESOLVED: Metabolic encephalopathy QJA-21-TZ: G93.41  ICD-9-CM: 348.31  2/8/2019 - 2/22/2019        RESOLVED: UTI (urinary tract infection) ICD-10-CM: N39.0  ICD-9-CM: 599.0  2/8/2019 - 2/22/2019              Discharge Condition: Stable    Discharge To: SNF    Consults: Cardiology, Gastroenterology and [address] [place] [address] Course: Frank Smith a 46 y.o. female with a PMHx ESRD on dialysis, DM, CAD, COPD, syst/diastolic heart failure, HTN, chronic liver disease who presented to the ED from dialysis due to AMS. History limited 2/2 AMS. ED eval revealed scleral icterus, confusion, leukocytosis, UTI. Hyperbilirubinemia (21.7) and elevated alk phos (571) are similar to recent ED visit at [facility] 2/1. Review of prior records from [facility] revealed she was supposed to have a percutaneous gallbladder drain placed on 2/4/19. Head CT neg. Pt will be admitted for further evaluation. IV rocephin started. Nephrology consulted - HD on 2/9 as patient missed the prior day. CT abd without contrast ordered, results below. GI consulted - serologies, paracentesis to eval fluid, start lactulose/rifaximin, vit K x3 days, cardio consult. UCx negative. PT recommending SNF. Paracentesis performed 2/11 - removed 100cc dark jed fluid, cell count profile consistent with SBP. Pt already on rocephin. Fluid cx ngtd. Midodrine added for HOTN. MRCP w/o contrast and transjugular liver bx ordered per GI. MRCP unable to be done 2/2 patient not cooperating. ERCP done on 2/13 instead, no CBD stone or stricture seen. Patient transferred to ICU afterwards 2/2 HOTN and need for pressors instead of IVF due to ESRD. ICU team concerned about AMS and pt's ability to protect airway so patient was intubated.  Pt's daughter informed of recent events, wishes for patient to remain full code. Dialysis 2/15. SBT, patient self extubated.  Remains on pressors but requiring less.  Passed swallow eval. Pressors D/C, BP stabilizing. Dialysis 2/18, 2/20. Transjuglar liver biopsy when stable. EGD duodenal ulcers, no CBD stone. Pt Hgb 6.6, transfused one unit, rechecked, and stable. SNF placement 2/21, will continue HD outpatient, patient appears to have returned to her baseline. Pt participating with PT/OT but complaining of RLE pain, pins/needles, radiating from lower back. Pain significantly improved with gabapentin and carolyn. Awaiting dialysis prior to discharge. Pt medically stable for d/c on 2/23. VSS.       Physical Exam:  General appearance: alert, cooperative, no distress, appears stated age  Head: Normocephalic, without obvious abnormality, atraumatic  Eyes: scleral icterus  Lungs: clear to auscultation bilaterally  Heart: regular rate and rhythm, S1, S2 normal, no murmur, click, rub or gallop  Abdomen: soft, non-tender. Bowel sounds normal. No masses,  no organomegaly  Extremities: atraumatic, no cyanosis or edema  Skin: Skin color, texture, turgor normal. No rashes or lesions  Neurologic: motor/sensory intact, slightly confused at times      Significant Diagnostic Studies:     BMP:   Lab Results   Component Value Date/Time     (L) 02/23/2019 04:00 AM    K 3.5 02/23/2019 04:00 AM    CL 98 (L) 02/23/2019 04:00 AM    CO2 27 02/23/2019 04:00 AM    AGAP 9 02/23/2019 04:00 AM     (H) 02/23/2019 04:00 AM    BUN 11 02/23/2019 04:00 AM    CREA 3.03 (H) 02/23/2019 04:00 AM    GFRAA 20 (L) 02/23/2019 04:00 AM    GFRNA 16 (L) 02/23/2019 04:00 AM     CBC:   Lab Results   Component Value Date/Time    WBC 8.4 02/23/2019 04:00 AM    HGB 8.2 (L) 02/23/2019 04:00 AM    HCT 25.0 (L) 02/23/2019 04:00 AM     (L) 02/23/2019 04:00 AM     A1C 2/14/19: 6.8         EXAM: CT of the abdomen and pelvis 2/8/19     INDICATION: Hyperbilirubinemia.  Elevated alkaline phosphatase.     COMPARISON: None     TECHNIQUE: Helical volumetric scanning through the abdomen and pelvis was  performed without oral or intravenous contrast.  Axial, coronal and sagittal  reconstructions were created. One or more dose reduction techniques were used on  this CT: automated exposure control, adjustment of the mAs and/or kVp according  to patient's size, and iterative reconstruction techniques. The specific  techniques utilized on this CT exam have been documented in the patient's  electronic medical record. Digital Imaging and Communications in Medicine  (DICOM) format image data are available to nonaffiliated external healthcare  facilities or entities on a secure, media free, reciprocally searchable basis  with patient authorization for at least a 12-month period after this study.     _______________     FINDINGS:     LOWER CHEST: Marked global cardiomegaly. No pericardial effusion. Small hiatal  hernia is present. Mild dependent atelectasis is present posteriorly within the  lung bases.     LIVER, BILIARY: Liver is prominent with a sagittal span just over 20 cm. No  focal liver lesion appreciated, within the limitations of noncontrast technique. No biliary dilation. Several small gallstones layer in the dependent  gallbladder. No convincing signs of cholecystitis.     PANCREAS: Within normal range for noncontrast technique.     SPLEEN: A couple wedge-shaped hypodensities are present in the spleen, anterior  upper pole, mid spleen posterior medially. Borderline splenomegaly.     ADRENALS: Normal.     KIDNEYS: Mildly atrophic. No hydronephrosis. Calcifications are renal vascular.     LYMPH NODES: No enlarged lymph nodes.     GASTROINTESTINAL TRACT: No significant bowel distention or wall thickening.     PELVIC ORGANS: Within normal limits.     VASCULATURE: Extensive calcific atherosclerosis is present. No AAA.     BONES: No suspicious osseous lesions.     OTHER: Moderate volume ascites is present in the abdomen and pelvis. Diffuse  anasarca is present.     _______________     IMPRESSION  IMPRESSION:        1. Hepatomegaly without a focal liver lesion appreciated on this noncontrast  exam.  2. Gallstones, no convincing signs of cholecystitis. 3. No biliary dilatation. 4. Wedge-shaped low densities in the spleen, probably small splenic infarct. 5. Moderate volume ascites. Anasarca. 6. Mildly atrophic kidneys bilaterally. 7. Diffuse calcific atherosclerosis. Echo 2/11/19  Interpretation Summary     · Left ventricular severely decreased systolic function. Estimated left ventricular ejection fraction is 21 - 25%. Left ventricular cavity size is dilated. Moderate (grade 2) left ventricular diastolic dysfunction. · Right ventricular cavity size is mildly dilated. Right ventricular global systolic function is reduced. Abnormal right ventricular septal motion. Diastolic flattening of interventricular septum consistent with right ventricle volume overload. · Right atrial cavity size is mildly dilated. · Mitral valve non-specific thickening. Mild mitral valve stenosis. · Non-specific thickening of the tricuspid valve. Moderate tricuspid valve regurgitation is present. · Severely elevated central venous pressure (15+ mmHg); IVC diameter is larger than 21 mm and collapses less than 50% with respiration. Myocardial Findings     Left Ventricle Normal wall thickness. The cavity size is dilated. There is severely decreased systolic function. The estimated ejection fraction is 21 - 25%. There is moderate (grade 2) left ventricular diastolic dysfunction. Right Ventricle The cavity size is mildly dilated. Global systolic function is reduced. Abnormal septal motion. Diastolic flattening of interventricular septum consistent with right ventricle volume overload. Right Atrium The cavity size is mildly dilated. Aortic Valve Trileaflet aortic valve structure. No stenosis. Mitral Valve Mitral valve non-specific thickening.  Mild stenosis present. Tricuspid Valve Non-specific thickening. Moderate tricuspid valve regurgitation. Pulmonary arterial systolic pressure is 24.9 mmHg. Pulmonic Valve The pulmonic valve was not assessed. Aorta The aorta was not well visualized. IVC/Hepatic Veins Severely elevated central venous pressure (15+ mmHg); IVC diameter is larger than 21 mm and collapses less than 50% with respiration. Pericardium No evidence of pericardial effusion. Discharge Medications:       Current Discharge Medication List      START taking these medications    Details   albuterol (PROVENTIL VENTOLIN) 2.5 mg /3 mL (0.083 %) nebulizer solution 3 mL by Nebulization route every four (4) hours as needed for Wheezing. Qty: 24 Each, Refills: 0      gabapentin (NEURONTIN) 100 mg capsule Take 1 Cap by mouth three (3) times daily. Qty: 90 Cap, Refills: 0      lactulose (CHRONULAC) 10 gram/15 mL solution Take 15 mL by mouth three (3) times daily. Qty: 10 Bottle, Refills: 0      midodrine (PROAMITINE) 2.5 mg tablet Take 3 Tabs by mouth every eight (8) hours for 30 days. Qty: 270 Tab, Refills: 0      oxyCODONE IR (ROXICODONE) 5 mg immediate release tablet Take 0.5 Tabs by mouth every three (3) hours as needed (for moderate pain). Max Daily Amount: 20 mg.  Qty: 20 Tab, Refills: 0    Associated Diagnoses: Pain of right lower extremity      pantoprazole (PROTONIX) 40 mg tablet Take 1 Tab by mouth Daily (before breakfast). Qty: 30 Tab, Refills: 0      rifAXIMin (XIFAXAN) 550 mg tablet Take 1 Tab by mouth two (2) times a day. Qty: 30 Tab, Refills: 0      b complex-vitamin c-folic acid 0.8 mg (NEPHRO-NACHO) 0.8 mg tab tablet Take 1 Tab by mouth daily. Qty: 30 Tab, Refills: 0      insulin glargine (LANTUS SOLOSTAR U-100 INSULIN) 100 unit/mL (3 mL) inpn 5 Units by SubCUTAneous route nightly.   Qty: 1 Pen, Refills: 0         CONTINUE these medications which have NOT CHANGED    Details   fluticasone-salmeterol (ADVAIR DISKUS) 250-50 mcg/dose diskus inhaler Take 1 Puff by inhalation every twelve (12) hours. ferric citrate (AURYXIA) 210 mg iron tablet Take  by mouth three (3) times daily (with meals). calcitRIOL (ROCALTROL) 0.5 mcg capsule Take 0.5 mcg by mouth daily. cinacalcet (SENSIPAR) 30 mg tablet Take 30 mg by mouth daily. STOP taking these medications       carvedilol (COREG) 25 mg tablet Comments:   Reason for Stopping:         hydrALAZINE (APRESOLINE) 25 mg tablet Comments:   Reason for Stopping:         isosorbide mononitrate ER (IMDUR) 60 mg CR tablet Comments:   Reason for Stopping:         sevelamer (RENAGEL) 400 mg tablet Comments:   Reason for Stopping:         multivitamin with minerals (RENAL PO) Comments:   Reason for Stopping:         sevelamer carbonate (RENVELA) 800 mg tab tab Comments:   Reason for Stopping:         traZODone (DESYREL) 100 mg tablet Comments:   Reason for Stopping:                 Activity: PT/OT per SNF    Diet: Renal/diabetic Diet    Wound Care: None needed    Follow-up: with staff MD on arrival  GI as outpatient for transjugular liver biopsy    Discharge time: >35 minutes    HUBER WilliamsonPaola 83  Office:  [phone]  Pager: [phone]      Clarification:  Patient had sepsis on admission and was treated for Sepsis during Hospitalization.     2/23/2019, 10:59 AM

## 2019-02-23 NOTE — PROGRESS NOTES
0700 --  -- Bedside, Verbal and Written shift change report given to Claire Peters (oncoming nurse) by Johana sutton). Report included the following information SBAR, Kardex, Intake/Output, MAR and Recent Results.  
   
0930 -- PM medications administered, pt tolerated with ease, will continue to monitor. 1002 -- Pt was provided with bath, gown, linen and wound care. 
  
1500 -- Report called to MARSHA [phone] at [facility]. Family was given prescription for pain medication, MEREDITH 2.5MG Q3 HOURS.

## 2019-02-23 NOTE — MANAGEMENT PLAN
Discharge/Transition Planning Reviewed chart and spoke with Dr Jayshree Reed. Pt to go to [facility] today and sig other to drive. RN will need to call report to [phone]. Will fax SNF short for D/c documents to facility Care Management Interventions PCP Verified by CM: Yes 
Palliative Care Criteria Met (RRAT>21 & CHF Dx)?: No 
Mode of Transport at Discharge: Self Transition of Care Consult (CM Consult): SNF Discharge Durable Medical Equipment: No 
Physical Therapy Consult: Yes Occupational Therapy Consult: Yes Speech Therapy Consult: No 
Current Support Network: Other Confirm Follow Up Transport: Family Plan discussed with Pt/Family/Caregiver: Yes Freedom of Choice Offered: Yes Discharge Location Discharge Placement: Skilled nursing facility Jazmyn Carlton RN BSN Outcomes Manager Pager # [phone]

## 2019-02-23 NOTE — DISCHARGE INSTRUCTIONS
DISCHARGE SUMMARY from Nurse    PATIENT INSTRUCTIONS:    After general anesthesia or intravenous sedation, for 24 hours or while taking prescription Narcotics:  · Limit your activities  · Do not drive and operate hazardous machinery  · Do not make important personal or business decisions  · Do  not drink alcoholic beverages  · If you have not urinated within 8 hours after discharge, please contact your surgeon on call. Report the following to your surgeon:  · Excessive pain, swelling, redness or odor of or around the surgical area  · Temperature over 100.5  · Nausea and vomiting lasting longer than 4 hours or if unable to take medications  · Any signs of decreased circulation or nerve impairment to extremity: change in color, persistent  numbness, tingling, coldness or increase pain  · Any questions    What to do at Home:  Recommended activity: Activity as tolerated. If you experience any of the following symptoms chest or leg pain, fever or shortness of breath, please follow up with Jaleel Baugh. *  Please give a list of your current medications to your Primary Care Provider. *  Please update this list whenever your medications are discontinued, doses are      changed, or new medications (including over-the-counter products) are added. *  Please carry medication information at all times in case of emergency situations. These are general instructions for a healthy lifestyle:    No smoking/ No tobacco products/ Avoid exposure to second hand smoke  Surgeon General's Warning:  Quitting smoking now greatly reduces serious risk to your health.     Obesity, smoking, and sedentary lifestyle greatly increases your risk for illness    A healthy diet, regular physical exercise & weight monitoring are important for maintaining a healthy lifestyle    You may be retaining fluid if you have a history of heart failure or if you experience any of the following symptoms:  Weight gain of 3 pounds or more overnight or 5 pounds in a week, increased swelling in our hands or feet or shortness of breath while lying flat in bed. Please call your doctor as soon as you notice any of these symptoms; do not wait until your next office visit. Recognize signs and symptoms of STROKE:    F-face looks uneven    A-arms unable to move or move unevenly    S-speech slurred or non-existent    T-time-call 911 as soon as signs and symptoms begin-DO NOT go       Back to bed or wait to see if you get better-TIME IS BRAIN. Warning Signs of HEART ATTACK     Call 911 if you have these symptoms:   Chest discomfort. Most heart attacks involve discomfort in the center of the chest that lasts more than a few minutes, or that goes away and comes back. It can feel like uncomfortable pressure, squeezing, fullness, or pain.  Discomfort in other areas of the upper body. Symptoms can include pain or discomfort in one or both arms, the back, neck, jaw, or stomach.  Shortness of breath with or without chest discomfort.  Other signs may include breaking out in a cold sweat, nausea, or lightheadedness. Don't wait more than five minutes to call 911 - MINUTES MATTER! Fast action can save your life. Calling 911 is almost always the fastest way to get lifesaving treatment. Emergency Medical Services staff can begin treatment when they arrive -- up to an hour sooner than if someone gets to the hospital by car. The discharge information has been reviewed with the {PATIENT PARENT GUARDIAN:12127}. The {PATIENT PARENT GUARDIAN:65405} verbalized understanding. Discharge medications reviewed with the {Dishcarge meds reviewed RWNW:19834} and appropriate educational materials and side effects teaching were provided.     Patient Education        Chronic Obstructive Pulmonary Disease (COPD): Care Instructions  Your Care Instructions    Chronic obstructive pulmonary disease (COPD) is a general term for a group of lung diseases, including emphysema and chronic bronchitis. People with COPD have decreased airflow in and out of the lungs, which makes it hard to breathe. The airways also can get clogged with thick mucus. Cigarette smoking is a major cause of COPD. Although there is no cure for COPD, you can slow its progress. Following your treatment plan and taking care of yourself can help you feel better and live longer. Follow-up care is a key part of your treatment and safety. Be sure to make and go to all appointments, and call your doctor if you are having problems. It's also a good idea to know your test results and keep a list of the medicines you take. How can you care for yourself at home?   Staying healthy    · Do not smoke. This is the most important step you can take to prevent more damage to your lungs. If you need help quitting, talk to your doctor about stop-smoking programs and medicines. These can increase your chances of quitting for good.     · Avoid colds and flu. Get a pneumococcal vaccine shot. If you have had one before, ask your doctor whether you need a second dose. Get the flu vaccine every fall. If you must be around people with colds or the flu, wash your hands often.     · Avoid secondhand smoke, air pollution, and high altitudes. Also avoid cold, dry air and hot, humid air. Stay at home with your windows closed when air pollution is bad.    Medicines and oxygen therapy    · Take your medicines exactly as prescribed. Call your doctor if you think you are having a problem with your medicine.     · You may be taking medicines such as:  ? Bronchodilators. These help open your airways and make breathing easier. Bronchodilators are either short-acting (work for 6 to 9 hours) or long-acting (work for 24 hours). You inhale most bronchodilators, so they start to act quickly. Always carry your quick-relief inhaler with you in case you need it while you are away from home. ? Corticosteroids (prednisone, budesonide). These reduce airway inflammation. They come in pill or inhaled form. You must take these medicines every day for them to work well.     · A spacer may help you get more inhaled medicine to your lungs. Ask your doctor or pharmacist if a spacer is right for you. If it is, ask how to use it properly.     · Do not take any vitamins, over-the-counter medicine, or herbal products without talking to your doctor first.     · If your doctor prescribed antibiotics, take them as directed. Do not stop taking them just because you feel better. You need to take the full course of antibiotics.     · Oxygen therapy boosts the amount of oxygen in your blood and helps you breathe easier. Use the flow rate your doctor has recommended, and do not change it without talking to your doctor first.   Activity    · Get regular exercise. Walking is an easy way to get exercise. Start out slowly, and walk a little more each day.     · Pay attention to your breathing. You are exercising too hard if you cannot talk while you are exercising.     · Take short rest breaks when doing household chores and other activities.     · Learn breathing methods--such as breathing through pursed lips--to help you become less short of breath.     · If your doctor has not set you up with a pulmonary rehabilitation program, talk to him or her about whether rehab is right for you. Rehab includes exercise programs, education about your disease and how to manage it, help with diet and other changes, and emotional support. Diet    · Eat regular, healthy meals. Use bronchodilators about 1 hour before you eat to make it easier to eat. Eat several small meals instead of three large ones.  Drink beverages at the end of the meal. Avoid foods that are hard to chew.     · Eat foods that contain protein so that you do not lose muscle mass.     · Talk with your doctor if you gain too much weight or if you lose weight without trying.    Mental health    · Talk to your family, friends, or a therapist about your feelings. It is normal to feel frightened, angry, hopeless, helpless, and even guilty. Talking openly about bad feelings can help you cope. If these feelings last, talk to your doctor. When should you call for help? Call 911 anytime you think you may need emergency care. For example, call if:    · You have severe trouble breathing.    Call your doctor now or seek immediate medical care if:    · You have new or worse trouble breathing.     · You cough up blood.     · You have a fever.    Watch closely for changes in your health, and be sure to contact your doctor if:    · You cough more deeply or more often, especially if you notice more mucus or a change in the color of your mucus.     · You have new or worse swelling in your legs or belly.     · You are not getting better as expected. Where can you learn more? Go to http://mike-fanta.info/. Jennifer Franklin in the search box to learn more about \"Chronic Obstructive Pulmonary Disease (COPD): Care Instructions. \"  Current as of: September 5, 2018  Content Version: 11.9  © 2211-5259 [employer] (vitals.com). Care instructions adapted under license by [employer] (which disclaims liability or warranty for this information). If you have questions about a medical condition or this instruction, always ask your healthcare professional. [address] any warranty or liability for your use of this information. [employer] Activation    Thank you for enrolling in [address] Please follow the instructions below to securely access your online medical record. [employer] allows you to send messages to your doctor, view your test results, renew your prescriptions, schedule appointments, and more. How Do I Sign Up? 1. In your internet browser, go to [email or web address]. 2. Click on the First Time User? Click Here link in the Sign In box.  You will see the New Member Sign Up page. 3. Enter your [employer] Access Code exactly as it appears below. You will not need to use this code after youve completed the sign-up process. If you do not sign up before the expiration date, you must request a new code. [employer] Access Code: [email or web address]  Expires: 3/25/2019  5:55 AM     4. Enter the last four digits of your Social Security Number (xxxx) and Date of Birth (mm/dd/yyyy) as indicated and click Submit. You will be taken to the next sign-up page. 5. Create a [employer] ID. This will be your [employer] login ID and cannot be changed, so think of one that is secure and easy to remember. 6. Create a [employer] password. You can change your password at any time. 7. Enter your Password Reset Question and Answer. This can be used at a later time if you forget your password. 8. Enter your e-mail address. You will receive e-mail notification when new information is available in [address] 9. Click Sign Up. You can now view your medical record. Additional Information    Remember, [employer] is NOT to be used for urgent needs. For medical emergencies, dial 911. Now available from your iPhone and Android!  ___________________________________________________________________________________________________________________________________    Patient Education        Learning About Chronic Kidney Disease  What is chronic kidney disease? Chronic kidney disease means your kidneys have not worked right for a while. Your kidneys have an important job. They remove waste and extra fluid from your blood. This waste and fluid goes out of your body in your urine. When your kidneys don't work as they should, wastes build up in your blood. This makes you sick. High blood pressure and diabetes can cause kidney damage. Other causes include kidney infections and some medicines. Chronic kidney disease is also called chronic renal failure. Or it may be called chronic renal insufficiency.   How is chronic kidney disease diagnosed? · Your doctor will do blood and urine tests to check your kidney function. This will help your doctor see how well your kidneys filter your blood. · Your doctor will ask you about past kidney problems. He or she will ask if you have a family history of kidney disease. Your doctor will also want to know what medicines you take. This includes prescription and over-the-counter medicines. · You may have a test, such as an ultrasound or CT scan. These tests let your doctor look at a picture of your kidneys. This can help your doctor measure the size of your kidneys and see if anything is blocking your urine flow. · In some cases, your doctor may take a tiny sample of kidney tissue. This is called a biopsy. It helps the doctor find out what caused the kidney disease. What are the symptoms? You may not have symptoms if your disease is mild. If you lose more kidney function, you may:  · Have swelling and weight gain. This is from the extra fluid in your tissues. It is called edema (say \"ih-ANDREA-muh\"). · Often feel sick to your stomach (nauseated) or vomit. · Have trouble sleeping. · Urinate less than normal.  · Have trouble thinking clearly. · Feel very tired. What can you expect when you have chronic kidney disease? · In the early stages of the disease, your kidneys are still able to regulate the fluids, salts, and waste products in your body. But if you keep losing kidney function, you may start to have problems, or complications. · How long it takes for the kidney disease to get worse depends on your condition. Sometimes it gets worse very slowly over many years. Or it may get worse more quickly. · When kidney function falls below a certain point, it is called kidney failure. Kidney failure affects your whole body. It can cause serious heart, bone, and brain problems and make you feel very ill. Untreated kidney failure can cause death. How is it treated?   Manage your health problems  · If you have diabetes, it's important to control your blood sugar level with diet, exercise, and medicines. If your blood sugar level is too high for too long, it can damage your kidneys. · If you have high blood pressure, it's important to control it with diet, exercise, and any medicines your doctor prescribes. · Avoid long-term use of medicines that can damage the kidneys. These medicines include nonsteroidal anti-inflammatory drugs. Examples of these are ibuprofen (Advil) and celecoxib (Celebrex). Treat kidney complications  · If your doctor put you on a special diet, stay on the diet. · If you have kidney failure, you'll probably have two treatment choices. Your doctor may recommend that you start kidney dialysis to filter wastes and extra fluid from your blood. Or it may be better to get a new kidney (transplant). Both treatments have risks and benefits. Talk with your doctor to decide which is best for you. Practice healthy habits  · If your doctor recommends it, get more exercise. Walking is a good choice. Bit by bit, increase the amount you walk every day. Try for at least 30 minutes on most days of the week. · Don't smoke. Smoking can make chronic kidney disease worse. If you need help quitting, talk to your doctor about stop-smoking programs and medicines. These can increase your chances of quitting for good. · Don't drink alcohol. Follow-up care is a key part of your treatment and safety. Be sure to make and go to all appointments, and call your doctor if you are having problems. It's also a good idea to know your test results and keep a list of the medicines you take. Where can you learn more? Go to http://mike-fanta.info/. Enter [phone] in the search box to learn more about \"Learning About Chronic Kidney Disease. \"  Current as of: March 14, 2018  Content Version: 11.9  © 7270-6479 [employer].  Care instructions adapted under license by [employer] (which disclaims liability or warranty for this information). If you have questions about a medical condition or this instruction, always ask your healthcare professional. [address] any warranty or liability for your use of this information.

## 2019-02-23 NOTE — PROGRESS NOTES
Renal Progress Note Follow up of ESRD Assessment/Plan: 1. ESRD. Dialyzed yesterday, 2 liters pulled. Continue mwf. Attempt to pull more fluid to address volume overload. 2. Inter/intradialytic hypotension. Continue midodrin. 3. Anemia of ckd. Continue greg with dialysis. 4. Secondary hyperparathyroidism of ckd. Will resume sensipar when oral intake is back to normal.  
5. Liver cirrhosis/ascites/sbp/jaundice. Finished ceftriaxone. GI on the case. EGD results noted- duodenal ulcers, no cbd stone. 6. Systolic chf.  
7. Altered ms. Improving. Will f/u on Monday, please call if any questions. Subjective: 
Looks, feels better. Alert, ox3  Tolerates diet, no cp/sob/n/v. C/o chronic lbp and bl le edema/pain. Patient Active Problem List  
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  
 SBP (spontaneous bacterial peritonitis) ([address] K65.2  Anemia, chronic disease D63.8  Coagulopathy ([address] D68.9  Thrombocytopenia ([address] D69.6  Severe protein-calorie malnutrition ([facility]) E43  
 Pain of right lower extremity M79.604  Diabetes mellitus type 2, controlled ([address] E11.9 Current Facility-Administered Medications Medication Dose Route Frequency Provider Last Rate Last Dose  gabapentin (NEURONTIN) capsule 100 mg  100 mg Oral TID Wilfredo Mcneill PA   100 mg at 02/23/19 1229  oxyCODONE IR (ROXICODONE) tablet 2.5 mg  2.5 mg Oral Q3H PRN Kelsy Mcneill PA   2.5 mg at 02/23/19 0014  albuterol-ipratropium (DUO-NEB) 2.5 MG-0.5 MG/3 ML  3 mL Nebulization Q4H PRN Jennifer Smith MD      
 0.9% sodium chloride infusion 250 mL  250 mL IntraVENous PRN Fern Oseguera PA-C   Stopped at 02/22/19 1400  albuterol (PROVENTIL VENTOLIN) nebulizer solution 2.5 mg  2.5 mg Nebulization Q4H PRN Hortencia Barber MD      
 pantoprazole (PROTONIX) tablet 40 mg  40 mg Oral ACB Marla Woodruff MD   40 mg at 02/23/19 0959  
 midodrine (PROAMITINE) tablet 7.5 mg  7.5 mg Oral Q8H Daniella Manriquez MD   7.5 mg at 02/23/19 0013  insulin lispro (HUMALOG) injection   SubCUTAneous AC&HS Noman Asencio MD   2 Units at 02/23/19 1000  
 glucose chewable tablet 16 g  4 Tab Oral PRN Josseline Martínez NP      
 glucagon (GLUCAGEN) injection 1 mg  1 mg IntraMUSCular PRN Garret LAKE, NP      
 dextrose (D50) infusion 12.5-25 g  25-50 mL IntraVENous PRN Garret LAKE, NP      
 epoetin briseida (EPOGEN;PROCRIT) injection 10,000 Units  10,000 Units IntraVENous DIALYSIS KITA WOODSON & Elizabeth Randall MD   10,000 Units at 02/22/19 2249  rifAXIMin (XIFAXAN) tablet 550 mg  550 mg Oral BID Joana Martin MD   550 mg at 02/23/19 6263  lactulose (CHRONULAC) solution 10 g  15 mL Oral TID Jonaa Martin MD   10 g at 02/23/19 0281  B complex-vitamin C-folic acid (NEPHRO-NACHO) 0.8 mg tab  1 Tab Oral DAILY Marla Woodruff MD   1 Tab at 02/23/19 2492 Objective: 
Vitals:  
 02/22/19 2330 02/22/19 2343 02/23/19 5065 02/23/19 0890 BP: 119/73 123/74 100/62 116/56 Pulse: (!) 105 (!) 104 97 96 Resp: 18 18 16 16 Temp:  97.7 °F (36.5 °C) 99.5 °F (37.5 °C) 98.2 °F (36.8 °C) TempSrc:      
SpO2:  100% 92% 100% Weight:  61.6 kg (135 lb 14.4 oz) Height:      
 
. Intake/Output Summary (Last 24 hours) at 2/23/2019 1435 Last data filed at 2/23/2019 7510 Gross per 24 hour Intake 440 ml Output 2000 ml Net -1560 ml Admission weight:Weight: 61.2 kg (135 lb) (02/08/19 0706) Last Weight Metrics: 
Weight Loss Metrics 2/22/2019 10/30/2018 Today's Wt 135 lb 14.4 oz 139 lb BMI 22.61 kg/m2 23.13 kg/m2 Physical Exam:  
 
General: Nad. Sclerae icteric. Neck: no jvd. LUNGS: good air entry, bl exp rhonchi. CVS EXM: S1, S2, no murmur, rub or gallop. Abdomen: Soft, Non Tender, moderately distended, non tense ascites. Lower Extremities: 2+ Edema. Access: lt arm avf, good thrill, dressings intact. Labs: CBC w/Diff Recent Labs  
  02/23/19 
0400 02/22/19 
0425 02/21/19 
0345 WBC 8.4 8.2 6.7  
RBC 2.98* 3.12* 2.89* HGB 8.2* 8.6* 8.0*  
HCT 25.0* 26.6* 24.6*  
* 112* 95* GRANS 66 57 64 LYMPH 20* 25 21 EOS 2 2 2 Chemistry Recent Labs  
  02/23/19 
0400 02/22/19 
0425 02/21/19 
1820 02/21/19 
0345 * 114* 164* 138* * 135* 133* 137  
K 3.5 4.1 4.6 3.9 CL 98* 97* 97* 99* CO2 27 26 24 29 BUN 11 16 15 11 CREA 3.03* 3.79* 3.46* 2.82* CA 9.0 9.3 9.5 8.7 AGAP 9 12 12 9 BUCR 4* 4* 4* 4* ALB 1.8* 2.1*  --  2.0*  
PHOS 2.0* 2.7  --  2.7 Lab Results Component Value Date/Time Iron 67 02/09/2019 05:48 AM  
 TIBC 143 (L) 02/09/2019 05:48 AM  
 Iron % saturation 47 02/09/2019 05:48 AM  
  
Lab Results Component Value Date/Time Calcium 9.0 02/23/2019 04:00 AM  
 CALCIUM,IONIZED 4.50 10/30/2018 11:15 AM  
 Phosphorus 2.0 (L) 02/23/2019 04:00 AM  
  
 
 
Vijaya David M.D [facility] Office [phone] Pager [phone]

## 2022-12-22 NOTE — WOUND CARE
Wound/Ostomy Nurse Progress Note Patient: Mckenna Brown :1970 MRN: [id number] Situation: Wound consult for skin tear. Background: Patient admitted for AMS, sepsis, UTI on 19. Assessment: The patient was working with PT when wound care arrived. Therapist assisted patient to a standing position with a walker, which allowed for assessment of her sacral area. A foam dressing was removed. There was a small amount of serosanguinous drainage on the dressing. She has a stage 2 pressure injury on her sacrum with peeling skin on the bre wound. The wound measures 3.5 x 4 x 0.1 cm. The base is 100% pink. A new dressing of Mepilex foam was applied. Recommendation: The patient is at high risk for further breakdown given health status and protein malnutrition. Continue with pressure injury prevention protocols with head-to-toe dual skin assessments q shift. Keystone Flap Text: The defect edges were debeveled with a #15 scalpel blade.  Given the location of the defect, shape of the defect a keystone flap was deemed most appropriate.  Using a sterile surgical marker, an appropriate keystone flap was drawn incorporating the defect, outlining the appropriate donor tissue and placing the expected incisions within the relaxed skin tension lines where possible. The area thus outlined was incised deep to adipose tissue with a #15 scalpel blade.  The skin margins were undermined to an appropriate distance in all directions around the primary defect and laterally outward around the flap utilizing iris scissors.

## (undated) DEVICE — KIT COLON W/ 1.1OZ LUB AND 2 END

## (undated) DEVICE — MEDI-VAC NON-CONDUCTIVE SUCTION TUBING: Brand: CARDINAL HEALTH

## (undated) DEVICE — KENDALL 500 SERIES DIAPHORETIC FOAM MONITORING ELECTRODE - TEAR DROP SHAPE ( 30/PK): Brand: KENDALL

## (undated) DEVICE — SYR 50ML SLIP TIP NSAF LF STRL --

## (undated) DEVICE — Device: Brand: BALLOON3

## (undated) DEVICE — FLEX ADVANTAGE 1500CC: Brand: FLEX ADVANTAGE

## (undated) DEVICE — BASIN EMESIS 500CC ROSE 250/CS 60/PLT: Brand: MEDEGEN MEDICAL PRODUCTS, LLC